# Patient Record
Sex: FEMALE | Race: WHITE | NOT HISPANIC OR LATINO | Employment: STUDENT | URBAN - METROPOLITAN AREA
[De-identification: names, ages, dates, MRNs, and addresses within clinical notes are randomized per-mention and may not be internally consistent; named-entity substitution may affect disease eponyms.]

---

## 2023-04-28 LAB
ALANINE AMINOTRANSFERASE (SGPT) (U/L) IN SER/PLAS: 11 U/L (ref 7–45)
ALBUMIN (G/DL) IN SER/PLAS: 4 G/DL (ref 3.4–5)
ALKALINE PHOSPHATASE (U/L) IN SER/PLAS: 43 U/L (ref 33–110)
ANION GAP IN SER/PLAS: 12 MMOL/L (ref 10–20)
ASPARTATE AMINOTRANSFERASE (SGOT) (U/L) IN SER/PLAS: 14 U/L (ref 9–39)
BASOPHILS (10*3/UL) IN BLOOD BY AUTOMATED COUNT: 0.11 X10E9/L (ref 0–0.1)
BASOPHILS/100 LEUKOCYTES IN BLOOD BY AUTOMATED COUNT: 1.7 % (ref 0–2)
BILIRUBIN TOTAL (MG/DL) IN SER/PLAS: 0.4 MG/DL (ref 0–1.2)
CALCIDIOL (25 OH VITAMIN D3) (NG/ML) IN SER/PLAS: 27 NG/ML
CALCIUM (MG/DL) IN SER/PLAS: 8.8 MG/DL (ref 8.6–10.6)
CARBON DIOXIDE, TOTAL (MMOL/L) IN SER/PLAS: 25 MMOL/L (ref 21–32)
CHLORIDE (MMOL/L) IN SER/PLAS: 106 MMOL/L (ref 98–107)
CHOLESTEROL (MG/DL) IN SER/PLAS: 115 MG/DL (ref 0–199)
CHOLESTEROL IN HDL (MG/DL) IN SER/PLAS: 32.5 MG/DL
CHOLESTEROL/HDL RATIO: 3.5
COBALAMIN (VITAMIN B12) (PG/ML) IN SER/PLAS: 587 PG/ML (ref 211–911)
CREATININE (MG/DL) IN SER/PLAS: 0.45 MG/DL (ref 0.5–1.05)
EOSINOPHILS (10*3/UL) IN BLOOD BY AUTOMATED COUNT: 0.25 X10E9/L (ref 0–0.7)
EOSINOPHILS/100 LEUKOCYTES IN BLOOD BY AUTOMATED COUNT: 3.9 % (ref 0–6)
ERYTHROCYTE DISTRIBUTION WIDTH (RATIO) BY AUTOMATED COUNT: 12.3 % (ref 11.5–14.5)
ERYTHROCYTE MEAN CORPUSCULAR HEMOGLOBIN CONCENTRATION (G/DL) BY AUTOMATED: 32.9 G/DL (ref 32–36)
ERYTHROCYTE MEAN CORPUSCULAR VOLUME (FL) BY AUTOMATED COUNT: 94 FL (ref 80–100)
ERYTHROCYTES (10*6/UL) IN BLOOD BY AUTOMATED COUNT: 3.72 X10E12/L (ref 4–5.2)
FERRITIN (UG/LL) IN SER/PLAS: 26 UG/L (ref 8–150)
GFR FEMALE: >90 ML/MIN/1.73M2
GLUCOSE (MG/DL) IN SER/PLAS: 76 MG/DL (ref 74–99)
HEMATOCRIT (%) IN BLOOD BY AUTOMATED COUNT: 35 % (ref 36–46)
HEMOGLOBIN (G/DL) IN BLOOD: 11.5 G/DL (ref 12–16)
IMMATURE GRANULOCYTES/100 LEUKOCYTES IN BLOOD BY AUTOMATED COUNT: 0.3 % (ref 0–0.9)
IRON (UG/DL) IN SER/PLAS: 72 UG/DL (ref 35–150)
IRON BINDING CAPACITY (UG/DL) IN SER/PLAS: 351 UG/DL (ref 240–445)
IRON SATURATION (%) IN SER/PLAS: 21 % (ref 25–45)
LDL: 70 MG/DL (ref 0–109)
LEUKOCYTES (10*3/UL) IN BLOOD BY AUTOMATED COUNT: 6.4 X10E9/L (ref 4.4–11.3)
LYMPHOCYTES (10*3/UL) IN BLOOD BY AUTOMATED COUNT: 1.92 X10E9/L (ref 1.2–4.8)
LYMPHOCYTES/100 LEUKOCYTES IN BLOOD BY AUTOMATED COUNT: 30.2 % (ref 13–44)
MONOCYTES (10*3/UL) IN BLOOD BY AUTOMATED COUNT: 0.66 X10E9/L (ref 0.1–1)
MONOCYTES/100 LEUKOCYTES IN BLOOD BY AUTOMATED COUNT: 10.4 % (ref 2–10)
NEUTROPHILS (10*3/UL) IN BLOOD BY AUTOMATED COUNT: 3.39 X10E9/L (ref 1.2–7.7)
NEUTROPHILS/100 LEUKOCYTES IN BLOOD BY AUTOMATED COUNT: 53.5 % (ref 40–80)
NON HDL CHOLESTEROL: 83 MG/DL (ref 0–119)
NRBC (PER 100 WBCS) BY AUTOMATED COUNT: 0 /100 WBC (ref 0–0)
PLATELETS (10*3/UL) IN BLOOD AUTOMATED COUNT: 215 X10E9/L (ref 150–450)
POTASSIUM (MMOL/L) IN SER/PLAS: 4.1 MMOL/L (ref 3.5–5.3)
PROTEIN TOTAL: 6.6 G/DL (ref 6.4–8.2)
SODIUM (MMOL/L) IN SER/PLAS: 139 MMOL/L (ref 136–145)
THYROTROPIN (MIU/L) IN SER/PLAS BY DETECTION LIMIT <= 0.05 MIU/L: 1.74 MIU/L (ref 0.44–3.98)
TRIGLYCERIDE (MG/DL) IN SER/PLAS: 65 MG/DL (ref 0–149)
UREA NITROGEN (MG/DL) IN SER/PLAS: 7 MG/DL (ref 6–23)
VLDL: 13 MG/DL (ref 0–40)

## 2023-07-20 LAB
BASOPHILS (10*3/UL) IN BLOOD BY AUTOMATED COUNT: 0.09 X10E9/L (ref 0–0.1)
BASOPHILS/100 LEUKOCYTES IN BLOOD BY AUTOMATED COUNT: 1.4 % (ref 0–2)
DEAMIDATED GLIADIN PEPTIDE IGA: <1 U/ML (ref 0–14)
DEAMIDATED GLIADIN PEPTIDE IGG: <1 U/ML (ref 0–14)
EOSINOPHILS (10*3/UL) IN BLOOD BY AUTOMATED COUNT: 0.19 X10E9/L (ref 0–0.7)
EOSINOPHILS/100 LEUKOCYTES IN BLOOD BY AUTOMATED COUNT: 2.9 % (ref 0–6)
ERYTHROCYTE DISTRIBUTION WIDTH (RATIO) BY AUTOMATED COUNT: 12.4 % (ref 11.5–14.5)
ERYTHROCYTE MEAN CORPUSCULAR HEMOGLOBIN CONCENTRATION (G/DL) BY AUTOMATED: 32 G/DL (ref 32–36)
ERYTHROCYTE MEAN CORPUSCULAR VOLUME (FL) BY AUTOMATED COUNT: 96 FL (ref 80–100)
ERYTHROCYTES (10*6/UL) IN BLOOD BY AUTOMATED COUNT: 3.87 X10E12/L (ref 4–5.2)
FERRITIN (UG/LL) IN SER/PLAS: 26 UG/L (ref 8–150)
HELICOBACTER PYLORI AG: NORMAL
HEMATOCRIT (%) IN BLOOD BY AUTOMATED COUNT: 37.2 % (ref 36–46)
HEMOGLOBIN (G/DL) IN BLOOD: 11.9 G/DL (ref 12–16)
IMMATURE GRANULOCYTES/100 LEUKOCYTES IN BLOOD BY AUTOMATED COUNT: 0.5 % (ref 0–0.9)
IRON (UG/DL) IN SER/PLAS: 91 UG/DL (ref 35–150)
IRON BINDING CAPACITY (UG/DL) IN SER/PLAS: 345 UG/DL (ref 240–445)
IRON SATURATION (%) IN SER/PLAS: 26 % (ref 25–45)
LEUKOCYTES (10*3/UL) IN BLOOD BY AUTOMATED COUNT: 6.5 X10E9/L (ref 4.4–11.3)
LYMPHOCYTES (10*3/UL) IN BLOOD BY AUTOMATED COUNT: 1.84 X10E9/L (ref 1.2–4.8)
LYMPHOCYTES/100 LEUKOCYTES IN BLOOD BY AUTOMATED COUNT: 28.3 % (ref 13–44)
MONOCYTES (10*3/UL) IN BLOOD BY AUTOMATED COUNT: 0.6 X10E9/L (ref 0.1–1)
MONOCYTES/100 LEUKOCYTES IN BLOOD BY AUTOMATED COUNT: 9.2 % (ref 2–10)
NEUTROPHILS (10*3/UL) IN BLOOD BY AUTOMATED COUNT: 3.75 X10E9/L (ref 1.2–7.7)
NEUTROPHILS/100 LEUKOCYTES IN BLOOD BY AUTOMATED COUNT: 57.7 % (ref 40–80)
NRBC (PER 100 WBCS) BY AUTOMATED COUNT: 0 /100 WBC (ref 0–0)
PLATELETS (10*3/UL) IN BLOOD AUTOMATED COUNT: 173 X10E9/L (ref 150–450)
TISSUE TRANSGLUTAMINASE IGG: <1 U/ML (ref 0–14)
TISSUE TRANSGLUTAMINASE, IGA: <1 U/ML (ref 0–14)

## 2023-11-22 ENCOUNTER — LAB (OUTPATIENT)
Dept: LAB | Facility: LAB | Age: 18
End: 2023-11-22
Payer: COMMERCIAL

## 2023-11-22 DIAGNOSIS — D50.9 IRON DEFICIENCY ANEMIA, UNSPECIFIED: ICD-10-CM

## 2023-11-22 DIAGNOSIS — E55.9 VITAMIN D DEFICIENCY, UNSPECIFIED: Primary | ICD-10-CM

## 2023-11-22 LAB
25(OH)D3 SERPL-MCNC: 26 NG/ML (ref 30–100)
BASOPHILS # BLD AUTO: 0.09 X10*3/UL (ref 0–0.1)
BASOPHILS NFR BLD AUTO: 1.1 %
EOSINOPHIL # BLD AUTO: 0.17 X10*3/UL (ref 0–0.7)
EOSINOPHIL NFR BLD AUTO: 2 %
ERYTHROCYTE [DISTWIDTH] IN BLOOD BY AUTOMATED COUNT: 12 % (ref 11.5–14.5)
FERRITIN SERPL-MCNC: 55 NG/ML (ref 8–150)
HCT VFR BLD AUTO: 37.4 % (ref 36–46)
HGB BLD-MCNC: 12.2 G/DL (ref 12–16)
IMM GRANULOCYTES # BLD AUTO: 0.03 X10*3/UL (ref 0–0.7)
IMM GRANULOCYTES NFR BLD AUTO: 0.4 % (ref 0–0.9)
IRON SATN MFR SERPL: 34 % (ref 25–45)
IRON SERPL-MCNC: 122 UG/DL (ref 35–150)
LYMPHOCYTES # BLD AUTO: 1.71 X10*3/UL (ref 1.2–4.8)
LYMPHOCYTES NFR BLD AUTO: 20.2 %
MCH RBC QN AUTO: 30.7 PG (ref 26–34)
MCHC RBC AUTO-ENTMCNC: 32.6 G/DL (ref 32–36)
MCV RBC AUTO: 94 FL (ref 80–100)
MONOCYTES # BLD AUTO: 0.73 X10*3/UL (ref 0.1–1)
MONOCYTES NFR BLD AUTO: 8.6 %
NEUTROPHILS # BLD AUTO: 5.73 X10*3/UL (ref 1.2–7.7)
NEUTROPHILS NFR BLD AUTO: 67.7 %
NRBC BLD-RTO: 0 /100 WBCS (ref 0–0)
PLATELET # BLD AUTO: 221 X10*3/UL (ref 150–450)
RBC # BLD AUTO: 3.97 X10*6/UL (ref 4–5.2)
TIBC SERPL-MCNC: 355 UG/DL (ref 240–445)
UIBC SERPL-MCNC: 233 UG/DL (ref 110–370)
WBC # BLD AUTO: 8.5 X10*3/UL (ref 4.4–11.3)

## 2023-11-22 PROCEDURE — 85025 COMPLETE CBC W/AUTO DIFF WBC: CPT

## 2023-11-22 PROCEDURE — 36415 COLL VENOUS BLD VENIPUNCTURE: CPT

## 2023-11-22 PROCEDURE — 82306 VITAMIN D 25 HYDROXY: CPT

## 2023-11-22 PROCEDURE — 83550 IRON BINDING TEST: CPT

## 2023-11-22 PROCEDURE — 83540 ASSAY OF IRON: CPT

## 2023-11-22 PROCEDURE — 82728 ASSAY OF FERRITIN: CPT

## 2023-11-24 NOTE — RESULT ENCOUNTER NOTE
Dameon Reyna!  Your labs look better. Your iron is where we need it to be. Please keep taking the same dose. I would like to make sure it stays there by planning to check it again in six months. Your vitamin D is still too low. I would add 2000 International Units to whatever you are taking each day to improve the level. This is important for preventing colds and flu this winter. Let me know if you have any questions.   Uma Shepard MD PhD

## 2024-02-06 ENCOUNTER — ALLIED HEALTH (OUTPATIENT)
Dept: INTEGRATIVE MEDICINE | Facility: CLINIC | Age: 19
End: 2024-02-06
Payer: COMMERCIAL

## 2024-02-06 VITALS
OXYGEN SATURATION: 99 % | DIASTOLIC BLOOD PRESSURE: 65 MMHG | BODY MASS INDEX: 23.1 KG/M2 | HEART RATE: 84 BPM | SYSTOLIC BLOOD PRESSURE: 105 MMHG | WEIGHT: 133.5 LBS

## 2024-02-06 DIAGNOSIS — K21.9 GASTROESOPHAGEAL REFLUX DISEASE WITHOUT ESOPHAGITIS: ICD-10-CM

## 2024-02-06 DIAGNOSIS — M25.571 ACUTE RIGHT ANKLE PAIN: ICD-10-CM

## 2024-02-06 DIAGNOSIS — E53.8 VITAMIN B12 DEFICIENCY: ICD-10-CM

## 2024-02-06 DIAGNOSIS — E55.9 VITAMIN D DEFICIENCY: ICD-10-CM

## 2024-02-06 DIAGNOSIS — D50.9 IRON DEFICIENCY ANEMIA, UNSPECIFIED IRON DEFICIENCY ANEMIA TYPE: ICD-10-CM

## 2024-02-06 DIAGNOSIS — F41.9 ANXIETY: Primary | ICD-10-CM

## 2024-02-06 PROCEDURE — 99215 OFFICE O/P EST HI 40 MIN: CPT | Performed by: INTERNAL MEDICINE

## 2024-02-06 RX ORDER — ERGOCALCIFEROL 1.25 MG/1
5000 CAPSULE ORAL
COMMUNITY

## 2024-02-06 RX ORDER — PNV 119/IRON FUM/FOLIC ACID 29 MG-1 MG
TABLET ORAL
COMMUNITY

## 2024-02-06 RX ORDER — MULTIVITAMIN/IRON/FOLIC ACID 18MG-0.4MG
1 TABLET ORAL DAILY
COMMUNITY

## 2024-02-06 RX ORDER — ESCITALOPRAM OXALATE 20 MG/1
25 TABLET ORAL DAILY
COMMUNITY

## 2024-02-06 RX ORDER — ACETAMINOPHEN, DIPHENHYDRAMINE HCL, PHENYLEPHRINE HCL 325; 25; 5 MG/1; MG/1; MG/1
1 TABLET ORAL DAILY
COMMUNITY

## 2024-02-06 RX ORDER — OMEGA-3-ACID ETHYL ESTERS 1 G/1
1 CAPSULE, LIQUID FILLED ORAL 2 TIMES DAILY
COMMUNITY

## 2024-02-06 RX ORDER — FERROUS SULFATE 325(65) MG
65 TABLET, DELAYED RELEASE (ENTERIC COATED) ORAL
COMMUNITY

## 2024-02-06 RX ORDER — ACETAMINOPHEN 500 MG
5000 TABLET ORAL DAILY
COMMUNITY

## 2024-02-06 ASSESSMENT — ENCOUNTER SYMPTOMS
SHORTNESS OF BREATH: 0
DYSURIA: 0
COUGH: 0
WEAKNESS: 0
FEVER: 0
ARTHRALGIAS: 0
APPETITE CHANGE: 0
BACK PAIN: 0
SLEEP DISTURBANCE: 0
ABDOMINAL PAIN: 1
FATIGUE: 0
NERVOUS/ANXIOUS: 1
HEADACHES: 0
DIFFICULTY URINATING: 0
MYALGIAS: 0

## 2024-02-06 NOTE — ASSESSMENT & PLAN NOTE
Well controlled.  I wonder if she could decrease dose of medication if her vitmain d were finally above normal. Recheck vitamin d level.

## 2024-02-06 NOTE — PATIENT INSTRUCTIONS
Try the ten minute - 20 minute full body stretches on peloton Steven Magiacomo and Oliver Aldis   Try to get at least 1 cup of leafy greens per day.   Check out the [employer] to see if there are things that interest you. The meeting is in approximately October of every year.   Get your labs but stay off the prenatal and the vitamin b12 for three days before.    Follow up in six months or before you leave for grad school.    Uma Shepard MD PhD

## 2024-02-06 NOTE — ASSESSMENT & PLAN NOTE
Resolved on plant based diet.  Continue to avoid foods that provoke her nausea and reflux. Monitor weight (which is fine) and b12 levels annually because at risk for b12 deficiency without supplementation.

## 2024-02-06 NOTE — PROGRESS NOTES
Integrative Medicine Follow-up Visit :     Subjective   Patient ID: Maribell Rosales is a 18 y.o. female who presents for fuv       HPI  Hurt her ankle three weeks ago. Right ankle. Twisted it. Hurts to weight bear. Can do weight lifting of her arms but cannot do hit exercise due to pain. Hurts to walk now. Originally after the twisting it her pain was 8/10. Now pain is 4/10.      Mood really good.  Thinks combination of exericse and diet really helping. Take meds as well. Exercising 30-60 minutes hit workouts.  2 days per week or barre. Does peloton for strength x 3 days per week 30 minutes.   Stomach is much better.  Does not bother her at all. Totally plant based. No issues with stomach pain or constipation. Eats a lot of fruit. Eating less vegetables. Eats an avocado.     Still consuming gluten and ok. Does not seem to bother her stomach except when she eats a lot of pasta which is rare    Wants to know if she weighs an appropriate amount.     Pain:right ankle. New. See above.     Review of Systems   Constitutional:  Negative for appetite change, fatigue and fever.   HENT:  Negative for congestion and hearing loss.    Eyes:  Negative for visual disturbance.   Respiratory:  Negative for cough and shortness of breath.    Cardiovascular:  Negative for chest pain and leg swelling.   Gastrointestinal:  Positive for abdominal pain (hx of severe painful reflux but none now).   Genitourinary:  Negative for difficulty urinating, dysuria and urgency.   Musculoskeletal:  Negative for arthralgias, back pain and myalgias.   Skin:  Negative for rash.   Neurological:  Negative for weakness and headaches.   Psychiatric/Behavioral:  Negative for behavioral problems and sleep disturbance. The patient is nervous/anxious (well controlled but hx of anxiety).                   Objective   /65 (BP Location: Left arm, Patient Position: Sitting, BP Cuff Size: Adult)   Pulse 84   Wt 60.6 kg (133 lb 8 oz)   SpO2 99%   BMI 23.10  kg/m²     Physical Exam  HENT:      Head: Normocephalic and atraumatic.      Mouth/Throat:      Mouth: Mucous membranes are moist.   Cardiovascular:      Rate and Rhythm: Normal rate.   Pulmonary:      Effort: Pulmonary effort is normal. No respiratory distress.   Musculoskeletal:         General: No swelling or deformity.      Cervical back: Normal range of motion.   Skin:     General: Skin is dry.      Findings: No rash.   Neurological:      General: No focal deficit present.      Mental Status: She is alert and oriented to person, place, and time.   Psychiatric:         Mood and Affect: Mood normal.         Thought Content: Thought content normal.      Comments: Normal affect                      Assessment/Plan     Problem List Items Addressed This Visit             ICD-10-CM    Acute right ankle pain M25.571     Obtain xray. Refer to ortho if fractured.          Relevant Orders    XR ankle right 3+ views    Referral to Orthopaedic Surgery    Anxiety - Primary F41.9     Well controlled.  I wonder if she could decrease dose of medication if her vitmain d were finally above normal. Recheck vitamin d level.          GE reflux K21.9     Resolved on plant based diet.  Continue to avoid foods that provoke her nausea and reflux. Monitor weight (which is fine) and b12 levels annually because at risk for b12 deficiency without supplementation.          Iron deficiency anemia D50.9     Iron studies improved most recently. Recommend test yearly. For now, stay on same dose.          Vitamin B12 deficiency E53.8    Relevant Orders    Vitamin B12    Vitamin D deficiency E55.9    Relevant Orders    Vitamin D 25-Hydroxy,Total (for eval of Vitamin D levels)         Recommend Follow up in : 6 months.    Uma Shepard MD PhD    Time Spent  Prep time on day of patient encounter: 5 minutes  Time spent directly with patient, family or caregiver: 30 minutes  Additional Time Spent on Patient Care Activities: 0  minutes  Documentation Time: 8 minutes  Other Time Spent: 0 minutes  Total: 43 minutes

## 2024-02-20 ENCOUNTER — HOSPITAL ENCOUNTER (OUTPATIENT)
Dept: RADIOLOGY | Facility: HOSPITAL | Age: 19
Discharge: HOME | End: 2024-02-20
Payer: COMMERCIAL

## 2024-02-20 DIAGNOSIS — M25.571 ACUTE RIGHT ANKLE PAIN: ICD-10-CM

## 2024-02-20 PROCEDURE — 73610 X-RAY EXAM OF ANKLE: CPT | Mod: RT

## 2024-02-20 PROCEDURE — 73610 X-RAY EXAM OF ANKLE: CPT | Mod: RIGHT SIDE | Performed by: RADIOLOGY

## 2024-02-27 DIAGNOSIS — M25.571 ACUTE RIGHT ANKLE PAIN: Primary | ICD-10-CM

## 2024-02-27 NOTE — PROGRESS NOTES
Subjective   Patient ID: Maribell Rosales is a 18 y.o. female who presents today, February 29, 2024 for a new patient evaluation and treatment of mid back pain and low back pain.    (1/20) BREANNA CHAMORRO'CLARISSA    Chiropractic Medicine HPI:  Provacative factors include : standing lateral raises  Palliative factors incude : ice medications stretching  Pain is described as : ache tingling knotty heavy  Previous treatment for complaint has included : ice NSAIDS stretching  Patient denies : bladder weakness bowel weakness catching clicking grinding instability numbness popping radiating pain into the distal extremity trauma  Intensity of the pain: Patient rates least severe pain at 3/10 on a numerical pain scale; Patient rates most severe pain at 10/10 on a numerical pain scale  Oswestry Disability Index has been completed: yes     Initial exam:   Maribell Rosales presents for evaluation and treatment of left sided scapular pain and low back pain. She states that her left sided scapular pain started in mid-January. She denies any traumas, accidents, incidents, or injuries. She states that she has noticed a significant loss of strength in the left arm when compared to the right. She states that she predominately notices this decreased strength and some pain when performing lateral raises with weight. She reports that her secondary complaint of low back pain. She states that these symptoms have been present for years and occur intermittently. She states that standing for extended periods of time will aggravate her symptoms, causing a diffuse bilateral discomfort parallel to her belt line. She denies any radiating symptoms.     Maribell also reports a recent ankle injury. She states that that she was walking and suddenly felt pain along the the medial plantar surface of the foot, along the medial aspect of the achilles tendon and the lateral aspect of the achilles tendon. She states that her range of motion was decreased, she  notes pain with palpation and difficulty with weight bearing movements. She denies any swelling or bruising.      Objective   Review of Systems   Constitutional: Negative.    Eyes: Negative.    Respiratory: Negative.     Cardiovascular: Negative.    Gastrointestinal: Negative.    Genitourinary: Negative.    Musculoskeletal:  Positive for back pain, gait problem and myalgias.   Hematological: Negative.    Psychiatric/Behavioral: Negative.     All other systems reviewed and are negative.    Physical Exam  Neurological:      General: No focal deficit present.      Mental Status: She is alert and oriented to person, place, and time.      Cranial Nerves: No dysarthria or facial asymmetry.      Sensory: Sensation is intact.      Motor: Weakness present.      Coordination: Coordination is intact.      Gait: Gait abnormal.      Spine Musculoskeletal Exam    Inspection    Leg length disparity: left greater than right    Shoulder elevation: right    Sagittal balance: anterior imbalance    Palpation    Cervical Spine    Tenderness: present      Periscapular: right    Right      Masses: none      Spasms: none      Crepitus: none      Muscle tone: increased    Left      Masses: none      Spasms: none      Crepitus: none      Muscle tone: normal    Thoracolumbar    Tenderness: present      Paraspinous: right      Iliac crest: right      Spinous process: upper lumbar and mid lumbar    Right      Masses: none      Spasms: none      Crepitus: none      Muscle tone: normal    Left      Masses: none      Spasms: none      Crepitus: none      Muscle tone: normal    Range of Motion    Cervical Spine       Cervical flexion: normal. Cervical flexion detail: no pain.     Cervical extension: normal. Cervical extension detail: no pain.       Right      Lateral bending: normal. Lateral bending detail: no pain.       Lateral rotation: normal. Lateral rotation detail: no pain.       Left      Lateral bending: normal. Lateral bending detail: no  pain.       Lateral rotation: normal. Lateral rotation detail: no pain.      Thoracolumbar       Flexion: normal. Flexion detail: no pain.     Extension: 75%. Extension detail: pain.       Right      Lateral bending: normal. Lateral bending detail: no pain.       Lateral rotation: normal. Lateral rotation detail: no pain.       Left      Lateral bending: normal. Lateral bending detail: no pain.       Lateral rotation: 75%. Lateral rotation detail: no pain.      Strength    Cervical Spine      Right      Deltoid: 5/5.       Shoulder external rotation: 5/5.       Biceps: 4/5.       Triceps: 5/5.       Wrist extension: 5/5.       Wrist flexion: 5/5.       FDP: 5/5.       Interossei: 5/5.       Left      Deltoid: 5/5.       Shoulder external rotation: 5/5.       Biceps: 5/5.       Triceps: 5/5.       Wrist extension: 5/5.       Wrist flexion: 5/5.       FDP: 5/5.       Interossei: 5/5.     Thoracolumbar       Right      Extensor hallucis longus: 5/5.       Tibialis anterior: 4/5. Tibialis anterior is affected by pain.       Plantar flexion: 4/5. Plantar flexion is affected by pain.       Peroneals: 4/5. Peroneals are affected by pain.       Quadriceps: 5/5.       Hamstrin/5.       Hip abductors: 5/5.       Hip flexion: 4/5.       Hip adduction: 5/5.        Left      Extensor hallucis longus: 5/5.       Tibialis anterior: 5/5.       Tibialis posterior: 5/5.       Plantar flexion: 5/5.       Peroneals: 5/5.       Quadriceps: 5/5.       Hamstrin/5.       Hip abductors: 5/5.       Hip flexion: 5/5.       Hip adduction: 5/5.      General    Neurological: alert     Orthopedic Tests:  Cervical:   Neutral compression negative.  Right Maximum compression negative.  Left Maximum compression negative.  Right Shoulder depression negative.  Left Shoulder depression negative.  Cervical distraction negative.  VBI Test negative.    Lumbar/Sacrum:   Right Bob's Lumbar negative.  Left Bob's Lumbar negative.  Right Yeoman's  negative.  Left Yeoman's negative.  Right Nachlas' negative.  Left Nachlas' negative.    Segmental Joint(s): Segmental joint dysfunction was assessed with motion palpation and is identified in the following areas:  Cervical : C2 C4  Thoracic : T2., T4, T5, T6, and T8  Lumbopelvic / Sacral SIJ : L2, L3, and L5/S1    Assessment/Plan   Today's Treatment Included: Chiropractic manipulation to the Segmental Joint(s) Cervical : C2 C4 Segmental Joint(s) Lumbopelvic/Sacral SIJ : L2, L3, and L5/S1 Segmental Joint(s) Thoracic : T2., T4, T5, T6, and T8   Treatment Techniques Used : Diversified CMT and Manual traction  Neuromuscular Re-Education (52430): Start time: 3:30 pm End time: 3:45 pm  1 Units  Pin and stretch  Integrative Dry Needling (IDN) - Needles in / out:  16.    Soft-tissue mobilization was performed in the following areas:           Peroneal mm. right, Calf mm. right, Flexor mm. right, and Achilles right  Trapezius mm. Upper , Middle , and Lower  left, Levator Scap. left, Rhomboids left, Supraspinatus left, and Infraspinatus left      Treatment Plan:   The patient and I discussed the risks and benefits of Chiropractic Care. Consent for care was given both written and orally by the patient.    Based on the patient's subjective complaints along with the examination findings, it is advised that a course of Chiropractic Treatment by initiated in the form of:   Chiropractic Manipulation (CMT)  Neuro-Muscular Re-education  Integrative Dry Needing (IDN)    Chiropractic treatment recommended at a frequency of 2 times per month for 1 month, then adjust care plan until symptoms are mild or manageable.    The goals of the treatment will be to: decrease pain, increase activity, increase range of motion, reduce lower back pain, improve quality of life, reduce leg pain, improve the ability to stand, return to athletic performance, decrease muscular hypertonicity, increase functional capacity, and improve postural  strength    The patient tolerated today's treatment with little or no additional discomfort and was instructed to contact the office for questions or concerns.     Follow up as scheduled.

## 2024-02-27 NOTE — PROGRESS NOTES
Ankle pain still moderately severe and not getting better. Refer to ortho. Patient aware.   Uma Shepard MD PhD

## 2024-02-29 ENCOUNTER — ALLIED HEALTH (OUTPATIENT)
Dept: INTEGRATIVE MEDICINE | Facility: CLINIC | Age: 19
End: 2024-02-29
Payer: COMMERCIAL

## 2024-02-29 DIAGNOSIS — M99.03 SEGMENTAL AND SOMATIC DYSFUNCTION OF LUMBAR REGION: ICD-10-CM

## 2024-02-29 DIAGNOSIS — M79.9 SOFT TISSUE DISORDER: ICD-10-CM

## 2024-02-29 DIAGNOSIS — M89.8X1 PAIN IN SCAPULA: ICD-10-CM

## 2024-02-29 DIAGNOSIS — M99.01 SEGMENTAL AND SOMATIC DYSFUNCTION OF CERVICAL REGION: ICD-10-CM

## 2024-02-29 DIAGNOSIS — M54.59 MECHANICAL LOW BACK PAIN: ICD-10-CM

## 2024-02-29 DIAGNOSIS — M99.04 SEGMENTAL AND SOMATIC DYSFUNCTION OF SACRAL REGION: ICD-10-CM

## 2024-02-29 DIAGNOSIS — M89.8X1 PERISCAPULAR PAIN: ICD-10-CM

## 2024-02-29 DIAGNOSIS — M54.59 POSTURAL LOW BACK PAIN: ICD-10-CM

## 2024-02-29 DIAGNOSIS — M99.02 SEGMENTAL AND SOMATIC DYSFUNCTION OF THORACIC REGION: Primary | ICD-10-CM

## 2024-02-29 DIAGNOSIS — M79.9 POSTURAL STRAIN: ICD-10-CM

## 2024-02-29 PROCEDURE — 98941 CHIROPRACT MANJ 3-4 REGIONS: CPT | Performed by: CHIROPRACTOR

## 2024-02-29 PROCEDURE — 97112 NEUROMUSCULAR REEDUCATION: CPT | Performed by: CHIROPRACTOR

## 2024-02-29 PROCEDURE — 99203 OFFICE O/P NEW LOW 30 MIN: CPT | Performed by: CHIROPRACTOR

## 2024-02-29 ASSESSMENT — ENCOUNTER SYMPTOMS
RESPIRATORY NEGATIVE: 1
BACK PAIN: 1
MYALGIAS: 1
CARDIOVASCULAR NEGATIVE: 1
PSYCHIATRIC NEGATIVE: 1
HEMATOLOGIC/LYMPHATIC NEGATIVE: 1
CONSTITUTIONAL NEGATIVE: 1
GASTROINTESTINAL NEGATIVE: 1
EYES NEGATIVE: 1

## 2024-03-06 DIAGNOSIS — M54.59 POSTURAL LOW BACK PAIN: ICD-10-CM

## 2024-03-06 DIAGNOSIS — M89.8X1 PAIN IN SCAPULA: ICD-10-CM

## 2024-03-06 DIAGNOSIS — M99.02 SEGMENTAL AND SOMATIC DYSFUNCTION OF THORACIC REGION: Primary | ICD-10-CM

## 2024-03-06 DIAGNOSIS — M79.9 SOFT TISSUE DISORDER: ICD-10-CM

## 2024-03-08 ENCOUNTER — APPOINTMENT (OUTPATIENT)
Dept: ORTHOPEDIC SURGERY | Facility: HOSPITAL | Age: 19
End: 2024-03-08
Payer: COMMERCIAL

## 2024-03-12 ENCOUNTER — OFFICE VISIT (OUTPATIENT)
Dept: ORTHOPEDIC SURGERY | Facility: CLINIC | Age: 19
End: 2024-03-12
Payer: COMMERCIAL

## 2024-03-12 ENCOUNTER — HOSPITAL ENCOUNTER (OUTPATIENT)
Dept: RADIOLOGY | Facility: CLINIC | Age: 19
Discharge: HOME | End: 2024-03-12
Payer: COMMERCIAL

## 2024-03-12 DIAGNOSIS — M25.571 ACUTE RIGHT ANKLE PAIN: ICD-10-CM

## 2024-03-12 DIAGNOSIS — S93.431A ANKLE SYNDESMOSIS DISRUPTION, RIGHT, INITIAL ENCOUNTER: Primary | ICD-10-CM

## 2024-03-12 PROCEDURE — 99213 OFFICE O/P EST LOW 20 MIN: CPT | Performed by: STUDENT IN AN ORGANIZED HEALTH CARE EDUCATION/TRAINING PROGRAM

## 2024-03-12 PROCEDURE — 73610 X-RAY EXAM OF ANKLE: CPT | Mod: RIGHT SIDE | Performed by: RADIOLOGY

## 2024-03-12 PROCEDURE — L1902 AFO ANKLE GAUNTLET PRE OTS: HCPCS | Performed by: STUDENT IN AN ORGANIZED HEALTH CARE EDUCATION/TRAINING PROGRAM

## 2024-03-12 PROCEDURE — 73610 X-RAY EXAM OF ANKLE: CPT | Mod: RT

## 2024-03-12 PROCEDURE — 1036F TOBACCO NON-USER: CPT | Performed by: STUDENT IN AN ORGANIZED HEALTH CARE EDUCATION/TRAINING PROGRAM

## 2024-03-12 ASSESSMENT — PAIN DESCRIPTION - DESCRIPTORS: DESCRIPTORS: SHARP

## 2024-03-12 ASSESSMENT — PAIN - FUNCTIONAL ASSESSMENT: PAIN_FUNCTIONAL_ASSESSMENT: 0-10

## 2024-03-12 ASSESSMENT — PAIN SCALES - GENERAL: PAINLEVEL_OUTOF10: 8

## 2024-03-12 NOTE — PROGRESS NOTES
ORTHOPAEDIC SURGERY HISTORY & PHYSICAL     Chief Complaint:  Right ankle pain    History Of Present Illness  Maribell Rosales is a 18 y.o. female who presents for evaluation of right ankle pain.  Patient reports the atraumatic onset of right ankle pain while walking approximately 6 weeks ago.  Patient denies specific injury or change in her activity or shoewear.  Pain is made worse with walking and standing.  She has never had a pain like this in the past.  She has tried dry needling as well as over-the-counter bracing and limiting her activity with persistent pain.  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  This has been difficult for her as well is working out.  She denies any associated numbness, tingling or weakness.     Past Medical History  Past Medical History:   Diagnosis Date    Allergy to milk products 01/30/2019    History of allergy to milk products    Unspecified asthma, uncomplicated 01/15/2018    Childhood asthma       Surgical History  Recent Surgeries in Orthopaedic Surgery            No cases to display             Social History  Social History     Socioeconomic History    Marital status: Single     Spouse name: None    Number of children: None    Years of education: None    Highest education level: None   Occupational History    None   Tobacco Use    Smoking status: Never     Passive exposure: Never    Smokeless tobacco: Never   Substance and Sexual Activity    Alcohol use: Never    Drug use: Never    Sexual activity: None   Other Topics Concern    None   Social History Narrative    None     Social Determinants of Health     Financial Resource Strain: Not on file   Food Insecurity: Not on file   Transportation Needs: Not on file   Physical Activity: Not on file   Stress: Not on file   Social Connections: Not on file   Intimate Partner Violence: Not on file   Housing Stability: Not on file       Family History  No family history on file.     Allergies  No Known  Allergies    Review of Systems  REVIEW OF SYSTEMS  Constitutional: no unplanned weight loss  Psychiatric: no suicidal ideation  ENT: no vision changes, no sinus problems  Pulmonary: no shortness of breath  Lymphatic: no enlarged lymph nodes  Cardiovascular: no chest pain or shortness of breath  Gastrointestinal: no stomach problems  Genitourinary: no dysuria   Skin: no rashes  Endocrine: no thyroid problems  Neurological: no headache, no numbness  Hematological: no easy bruising  Musculoskeletal: Right ankle pain    Physical Exam  PHYSICAL EXAMINATION  Constitutional Exam: well developed and well nourished  Psychiatric Exam: alert and oriented, appropriate mood and behavior  Eye Exam: EOMI  Pulmonary Exam: breathing non-labored, no apparent distress  Lymphatic exam: no appreciable lymphadenopathy in the lower extremities  Cardiovascular exam: RRR to peripheral palpation, DP pulses 2+, PT 2+, toes are pink with good capillary refill, no pitting edema  Skin exam: no open lesions, rashes, abrasions or ulcerations  Neurological exam: sensation to light touch intact in both lower extremities in peripheral and dermatomal distributions (except for any abnormalities noted in musculoskeletal exam)    Musculoskeletal exam: Right lower extremity examination.  Patient pain localized to the anterolateral ankle as well as the posterior medial ankle.  She is focally tender to palpation overlying the ATFL/AITFL and PTT.  She is nontender to palpation at the CFL and peroneal tendons.  No obvious medial gutter tenderness or superficial/deep deltoid.  Patient has physiologic hindfoot valgus with neutral arch posture and no significant forefoot deformity noted.  Patient has pain-free and supple ankle, subtalar midtarsal joint range of motion.  Patient has sensation intact light touch grossly to saphenous, sural, superficial peroneal, deep peroneal and tibial nerve distribution.  She has 5 out of 5 strength in plantarflexion,  dorsiflexion and EHL.  She has 2+ DP/PT pulse palpated.  She has a mildly positive anterior drawer, negative talar tilt.  Negative syndesmotic squeeze test and negative dorsiflexion external rotation stress test.  Patient is able to both heel and toe walk but with pain.  She reports significant pain with an attempted single-leg hop on the right.    Last Recorded Vitals  There were no vitals taken for this visit.    Laboratory Results    No results found for this or any previous visit (from the past 24 hour(s)).    Radiology Results   X-ray imaging 3 view weightbearing right ankle reviewed from 03/12/2024 and independently evaluated by me demonstrates no acute fracture or dislocation.  Ankle mortise remains well aligned.  There is no obvious increased distal tib-fib clear space.    Assessment/Plan:  18-year-old female who my impression has right ankle pain at least concerning for right syndesmotic injury.  I have reviewed the diagnosis and treatment options extensively with the patient.  In my impression the patient may continue weightbearing as tolerated in her right lower extremity.  I will provide her with a lace up style ankle brace for increased support and refer formally to physical therapy to work on ankle range of motion, strengthening and proprioceptive training.  I have encouraged the patient to trial a topical anti-inflammatory, in particular for use directly over the painful region.  I will tentatively plan to see the patient back in approximate 6 weeks for repeat clinical evaluation.  I discussed with the patient that if she is not clinically improving, may consider MRI right ankle to evaluate for syndesmotic injury.  I have encouraged the patient to contact the office she develops any new pain, worsening symptoms if she has any further questions.  Upon return, patient would not require further imaging.    Patient was prescribed a mobility device, lace up ankle brace,  for right syndesmotic injury  problem.  This mobility device is required for the following reasons:    1. The patient has a mobility limitation that significantly impairs their ability to participate in one or more mobility-related activities of daily living (MRADL) in the home; and  2. The patient is able to safely use the mobility device; and  3. The functional mobility deficit can be sufficiently resolved with use of the mobility device    Verbal and written instructions for the use, wear schedule, cleaning and application of this item were given.  Education provided also included gait training and safety precautions when using this device. Patient was instructed that should the item result in increased pain, decreased sensation, increased swelling, or an overall worsening of their medical condition, to please contact our office immediately.     Antwon Washington MD, BINTA  Department of Orthopaedic Surgery  [facility]    The diagnosis and treatment plan were reviewed with the patient. All questions were answered. The patient verbalized understanding of the treatment plan. There were no barriers to understanding identified.    Note dictated with [employer] software.  Completed without full type editing and sent to avoid delay.

## 2024-03-14 ENCOUNTER — ALLIED HEALTH (OUTPATIENT)
Dept: INTEGRATIVE MEDICINE | Facility: CLINIC | Age: 19
End: 2024-03-14
Payer: COMMERCIAL

## 2024-03-14 DIAGNOSIS — M89.8X1 PAIN IN SCAPULA: ICD-10-CM

## 2024-03-14 DIAGNOSIS — M54.59 POSTURAL LOW BACK PAIN: ICD-10-CM

## 2024-03-14 DIAGNOSIS — M99.02 SEGMENTAL AND SOMATIC DYSFUNCTION OF THORACIC REGION: ICD-10-CM

## 2024-03-14 DIAGNOSIS — M79.9 SOFT TISSUE DISORDER: ICD-10-CM

## 2024-03-14 DIAGNOSIS — M99.04 SEGMENTAL AND SOMATIC DYSFUNCTION OF SACRAL REGION: ICD-10-CM

## 2024-03-14 DIAGNOSIS — M99.01 SEGMENTAL AND SOMATIC DYSFUNCTION OF CERVICAL REGION: Primary | ICD-10-CM

## 2024-03-14 DIAGNOSIS — M99.02 SEGMENTAL AND SOMATIC DYSFUNCTION OF THORACIC REGION: Primary | ICD-10-CM

## 2024-03-14 DIAGNOSIS — M79.9 POSTURAL STRAIN: ICD-10-CM

## 2024-03-14 DIAGNOSIS — M99.03 SEGMENTAL AND SOMATIC DYSFUNCTION OF LUMBAR REGION: ICD-10-CM

## 2024-03-14 PROCEDURE — 97112 NEUROMUSCULAR REEDUCATION: CPT | Performed by: CHIROPRACTOR

## 2024-03-14 PROCEDURE — 98941 CHIROPRACT MANJ 3-4 REGIONS: CPT | Performed by: CHIROPRACTOR

## 2024-03-14 NOTE — PROGRESS NOTES
Subjective   Patient ID: Maribell Rosales is a 18 y.o. female who presents March 14, 2024 for chiropractic care.    [facility] - ED: 5/29/2024  Today, the patient rates their degree of pain as a 7 out of 10 on the numeric pain rating scale.     Maribell returns for continued chiropractic care. She states that she noticed significant relief with her scapular symptoms for a couple days following her initial visit. She states that she had her ankle formally evaluated and it was determined that she had a high ankle sprain and was referred to physical therapy. She was also given an ankle brace to wear. Today, she presents with continued shoulder pain and low back discomfort. The patient denies any changes in health since her last encounter and will follow up as scheduled.   ________________________________________  Initial exam:   Maribell Rosales presents for evaluation and treatment of left sided scapular pain and low back pain. She states that her left sided scapular pain started in mid-January. She denies any traumas, accidents, incidents, or injuries. She states that she has noticed a significant loss of strength in the left arm when compared to the right. She states that she predominately notices this decreased strength and some pain when performing lateral raises with weight. She reports that her secondary complaint of low back pain. She states that these symptoms have been present for years and occur intermittently. She states that standing for extended periods of time will aggravate her symptoms, causing a diffuse bilateral discomfort parallel to her belt line. She denies any radiating symptoms.     Maribell also reports a recent ankle injury. She states that that she was walking and suddenly felt pain along the the medial plantar surface of the foot, along the medial aspect of the achilles tendon and the lateral aspect of the achilles tendon. She states that her range of motion was decreased, she notes pain with palpation  and difficulty with weight bearing movements. She denies any swelling or bruising.      Objective   Physical Exam  Neurological:      General: No focal deficit present.      Mental Status: She is alert and oriented to person, place, and time.      Cranial Nerves: No dysarthria or facial asymmetry.      Sensory: Sensation is intact.      Motor: Motor function is intact.      Coordination: Coordination is intact.      Gait: Gait is intact.       Palpation of the following region(s) revealed:  Cervical: Upper trapezius left, hypertonicity and tenderness.  Levator scapulae left, hypertonicity and tenderness.  Thoracic: Thoracic paraspinals left, hypertonicity and tenderness.  Middle trapezius left, hypertonicity and tenderness.  Rhomboids left, hypertonicity and tenderness.  Latissimus dorsi left, hypertonicity and tenderness.  Lumbar: Lumbar paraspinals bilateral, muscular hypertonicity.        Segmental Joint(s): Segmental joint dysfunction was assessed with motion palpation and is identified in the following areas:  Cervical : C2 C5  Thoracic : T4 and T5  Lumbopelvic / Sacral SIJ : L5/S1 and R SIJ    Assessment/Plan   Today's Treatment Included: Chiropractic manipulation to the Segmental Joint(s) Cervical : C2 C5 C6 Segmental Joint(s) Lumbopelvic/Sacral SIJ : L2, L5/S1, and L SIJ Segmental Joint(s) Thoracic : T4, T5, and T8   Treatment Techniques Used : Diversified CMT, Pelvic drop table technique, and Manual traction  Neuromuscular Re-Education (14766): Start time: 9:00 am End time: 9:30 am  2 Units  Pin and stretch  Soft-Tissue manipulation  Integrative Dry Needling (IDN) - Needles in / out:  11.    Soft-tissue mobilization was performed in the following areas:     Thoracic Paraspinal mm. bilateral  Lumbar Paraspinal mm. bilateral and Quadratus Lumborum bilateral      Trapezius mm. Upper  and Middle  left, Levator Scap. left, Rhomboids left, Supraspinatus left, Infraspinatus left, and Deltoid mm. left      The  patient tolerated today's treatment with little or no additional discomfort and was instructed to contact the office for questions or concerns.

## 2024-03-20 ENCOUNTER — APPOINTMENT (OUTPATIENT)
Dept: INTEGRATIVE MEDICINE | Facility: CLINIC | Age: 19
End: 2024-03-20
Payer: COMMERCIAL

## 2024-03-26 ENCOUNTER — ALLIED HEALTH (OUTPATIENT)
Dept: INTEGRATIVE MEDICINE | Facility: CLINIC | Age: 19
End: 2024-03-26
Payer: COMMERCIAL

## 2024-03-26 DIAGNOSIS — M99.04 SEGMENTAL AND SOMATIC DYSFUNCTION OF SACRAL REGION: ICD-10-CM

## 2024-03-26 DIAGNOSIS — M99.02 SEGMENTAL AND SOMATIC DYSFUNCTION OF THORACIC REGION: ICD-10-CM

## 2024-03-26 DIAGNOSIS — M54.59 POSTURAL LOW BACK PAIN: ICD-10-CM

## 2024-03-26 DIAGNOSIS — M89.8X1 PAIN IN SCAPULA: ICD-10-CM

## 2024-03-26 DIAGNOSIS — M79.9 POSTURAL STRAIN: ICD-10-CM

## 2024-03-26 DIAGNOSIS — M99.03 SEGMENTAL AND SOMATIC DYSFUNCTION OF LUMBAR REGION: ICD-10-CM

## 2024-03-26 DIAGNOSIS — M99.01 SEGMENTAL AND SOMATIC DYSFUNCTION OF CERVICAL REGION: Primary | ICD-10-CM

## 2024-03-26 PROCEDURE — 98941 CHIROPRACT MANJ 3-4 REGIONS: CPT | Performed by: CHIROPRACTOR

## 2024-03-26 PROCEDURE — 97112 NEUROMUSCULAR REEDUCATION: CPT | Performed by: CHIROPRACTOR

## 2024-03-26 NOTE — PROGRESS NOTES
Subjective   Patient ID: Maribell Rosales is a 19 y.o. female who presents March 26, 2024 for chiropractic care.    [facility] - ED: 5/29/2024    Today, the patient rates their degree of pain as a 7 out of 10 on the numeric pain rating scale.     Maribell returns for continued chiropractic care. She she reports that her ankle swelling and pain increased this week following a workout. She states that her shoulder is feeling better, but that her pain continues to decrease. The patient denies any changes in health since her last encounter and will follow up as scheduled.   ________________________________________  Initial exam:   Maribell Rosales presents for evaluation and treatment of left sided scapular pain and low back pain. She states that her left sided scapular pain started in mid-January. She denies any traumas, accidents, incidents, or injuries. She states that she has noticed a significant loss of strength in the left arm when compared to the right. She states that she predominately notices this decreased strength and some pain when performing lateral raises with weight. She reports that her secondary complaint of low back pain. She states that these symptoms have been present for years and occur intermittently. She states that standing for extended periods of time will aggravate her symptoms, causing a diffuse bilateral discomfort parallel to her belt line. She denies any radiating symptoms.     Maribell also reports a recent ankle injury. She states that that she was walking and suddenly felt pain along the the medial plantar surface of the foot, along the medial aspect of the achilles tendon and the lateral aspect of the achilles tendon. She states that her range of motion was decreased, she notes pain with palpation and difficulty with weight bearing movements. She denies any swelling or bruising.      Objective   Physical Exam  Neurological:      General: No focal deficit present.      Mental Status: She is alert  and oriented to person, place, and time.      Cranial Nerves: No dysarthria or facial asymmetry.      Sensory: Sensation is intact.      Motor: Motor function is intact.      Coordination: Coordination is intact.      Gait: Gait is intact.       Palpation of the following region(s) revealed:  Cervical: Upper trapezius left, hypertonicity and tenderness.  Levator scapulae left, hypertonicity and tenderness.  Thoracic: Thoracic paraspinals left, hypertonicity and tenderness.  Middle trapezius left, hypertonicity and tenderness.  Rhomboids left, hypertonicity and tenderness.  Latissimus dorsi left, hypertonicity and tenderness.  Lumbar: Lumbar paraspinals bilateral, muscular hypertonicity.    Segmental Joint(s): Segmental joint dysfunction was assessed with motion palpation and is identified in the following areas:  Cervical : C2 C5  Thoracic : T4 and T5  Lumbopelvic / Sacral SIJ : L5/S1 and R SIJ    Assessment/Plan   Today's Treatment Included: Chiropractic manipulation to the Segmental Joint(s) Cervical : C2 C5 Segmental Joint(s) Lumbopelvic/Sacral SIJ : L5/S1, R SIJ, and L SIJ Segmental Joint(s) Thoracic : T3, T4, and T5   Treatment Techniques Used : Diversified CMT, Pelvic drop table technique, and Manual traction  Neuromuscular Re-Education (82517): Start time: 3:10 pm End time: 3:40 pm  2 Units  Pin and stretch  Soft-Tissue manipulation  Integrative Dry Needling (IDN) - Needles in / out:  5.    Soft-tissue mobilization was performed in the following areas:   Thoracic Paraspinal mm. bilateral  Lumbar Paraspinal mm. bilateral and Quadratus Lumborum bilateral  Trapezius mm. Upper  and Middle  left, Levator Scap. left, Rhomboids left, Supraspinatus left, Infraspinatus left, and Deltoid mm. left    The patient tolerated today's treatment with little or no additional discomfort and was instructed to contact the office for questions or concerns.

## 2024-03-28 ENCOUNTER — EVALUATION (OUTPATIENT)
Dept: PHYSICAL THERAPY | Facility: CLINIC | Age: 19
End: 2024-03-28
Payer: COMMERCIAL

## 2024-03-28 DIAGNOSIS — M25.571 ACUTE RIGHT ANKLE PAIN: Primary | ICD-10-CM

## 2024-03-28 DIAGNOSIS — S93.431A ANKLE SYNDESMOSIS DISRUPTION, RIGHT, INITIAL ENCOUNTER: ICD-10-CM

## 2024-03-28 PROCEDURE — 97110 THERAPEUTIC EXERCISES: CPT | Mod: GP | Performed by: PHYSICAL THERAPIST

## 2024-03-28 PROCEDURE — 97161 PT EVAL LOW COMPLEX 20 MIN: CPT | Mod: GP | Performed by: PHYSICAL THERAPIST

## 2024-03-28 ASSESSMENT — ENCOUNTER SYMPTOMS
OCCASIONAL FEELINGS OF UNSTEADINESS: 0
DEPRESSION: 0

## 2024-03-29 NOTE — PROGRESS NOTES
Physical Therapy    Physical Therapy Evaluation and Treatment      Patient Name: Maribell Rosales  MRN: [id number]  Today's Date: 3/28/2024  Time Calculation  Start Time: 0830  Stop Time: 0920  Time Calculation (min): 50 min  PT Evaluation Time Entry  PT Evaluation (Low) Time Entry: 25  PT Therapeutic Procedures Time Entry  Manual Therapy Time Entry: 8  Therapeutic Exercise Time Entry: 10     Visit #1    Assessment:  Maribell is a 19 y.o. female presenting with c/o acute R ankle pain without specific incident. Exam shows TTP to anterior tib/fib ligament, ATFL, Achilles tendon and MLA, painful and restricted ankle joint mobility, mild ankle weakness, decreased length in R gastrocs, impaired gait, impaired balance and functional weakness. She is limited in weight-bearing activities and has stopped exercising secondary to pain. She is an excellent candidate for skilled PT to address these impairments and reduce pain to allow progression back to prior activity level.    Plan:  OP PT Plan  Treatment/Interventions: Cryotherapy, Dry needling, Education/ Instruction, Electrical stimulation, Hot pack, Manual therapy, Neuromuscular re-education, Therapeutic exercises, Vasopneumatic device  PT Plan: Skilled PT  PT Frequency: 1 time per week  Duration: 6-8 weeks  Onset Date: 01/15/24  Number of Treatments Authorized: 20  Rehab Potential: Excellent  Plan of Care Agreement: Patient    Current Problem:   1. Acute right ankle pain  Referral to Physical Therapy    Follow Up In Physical Therapy      2. Ankle syndesmosis disruption, right, initial encounter  Referral to Physical Therapy    Follow Up In Physical Therapy        General:  Reason for Referral: Right ankle pain  Referred By: Dr. Washington  Preferred Learning Style: verbal, visual, written    Subjective    Maribell c/o acute R ankle pain that began in mid-January without specific injury. She states she was out shopping and began to feel intense pain in ankle. She ignored pain for the  first few weeks but pain progressively worsened. She saw chiropractor and had dry needling which helped. She was evaluated by Dr. Washington and had x-rays which were negative for bony abnormality. Pain is worse with weight-bearing activities. She is unable to stand >30 mins before having to sit down. 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 She has stopped doing barre, HIIT, aerobic and weight-training classes. She is wearing an ankle brace and taking Advil/Motrin mainly at night. She also c/o intermittent swelling. She denies prior h/o R ankle injury.    Pt Goals: “Be able to workout without pain.”    Precautions:  Precautions  STEADI Fall Risk Score (The score of 4 or more indicates an increased risk of falling): 0    Pain:   8/10 pain currently, up to 10/10 after full day of school and work; achy and tight    Prior Level of Function:   Independent in all activities; very active    Objective   Palpation: TTP to anterior tib/fib ligament, ATFL, Achilles tendon and MLA    Observation: mild B pes planus; B calcaneal valgus    Gait: decreased RLE stance time; slow kirt    Single leg balance: R- 7-8 seconds; L- 30 seconds    Single heel raise: R- 1”; L 4.25”    Ankle AROM (R/L in degrees):   Dorsiflexion- 7/ 18  Plantarflexion- 50/ 70  Inversion- 44/ 46  Eversion- 10/ 20  *R ankle AROM painful in all directions    Ankle/Foot MMT (R/L):   Tibialis anterior- 4+/5; 5/5  Tibialis posterior- 4+/5; 5/5  Peroneals- 4+/5; 5/5  Soleus- 4+/5; 4+/5  Gastrocs- 5/5; 5/5    LE MMT (R/L):   Iliopsoas- 4+/5; 4+/5  Hip ER- 4+/5; 4+/5  Glute med- 4+/5; 4+/5  Glute max- 5/5; 5/5  Quadriceps- 5/5; 5/5  Hamstrings- 5/5; 5/5    Length tests (R/L in degrees):   Gastrocs- 0/ 10    Special tests:   Mild laxity with Anterior Drawer  (-) Inv talar tilt  (-) DF/ external rotation test    Outcome Measures:  Lowe Extremity Functional Scale: 30/80    Treatments:  Manual Therapy: 8 min  R talar distraction  Grade II  "R talar dorsal glides  PROM R ankle    Therapeutic Exercise: 10 min  Ankle pumps and circles x10 ea   Towel calf stretch 3x30 sec  Ankle DF and PF, yellow band, x10 ea    EDUCATION:  Issued/reviewed HEP to be performed 2x/day    Goals:  Active       PT Problem       Increase R ankle AROM to 15-20 degrees DF, 60-70 degrees PF and 20 degrees EV.        Start:  03/28/24    Expected End:  05/27/24            Increase R ankle MMT to 5/5 throughout and improve R single heel raise to at least 4\".       Start:  03/28/24    Expected End:  05/27/24            Increase length in R gastrocs to 10 degrees.       Start:  03/28/24    Expected End:  05/27/24            Pt will amb with proper HS/TO pattern and equal stance time/step length to restore normal gait pattern.       Start:  03/28/24    Expected End:  05/27/24            Pt will maintain R SLS for 30 seconds on firm surface without LOB.       Start:  03/28/24    Expected End:  05/27/24            Pt will report 0/10 R ankle pain to improve prolonged standing and walking, and allow return to exercise classes.       Start:  03/28/24    Expected End:  05/27/24            Improve LEFS by at least 9 points (MDIC).       Start:  03/28/24    Expected End:  05/27/24              "

## 2024-04-02 ENCOUNTER — TREATMENT (OUTPATIENT)
Dept: PHYSICAL THERAPY | Facility: CLINIC | Age: 19
End: 2024-04-02
Payer: COMMERCIAL

## 2024-04-02 ENCOUNTER — ALLIED HEALTH (OUTPATIENT)
Dept: INTEGRATIVE MEDICINE | Facility: CLINIC | Age: 19
End: 2024-04-02
Payer: COMMERCIAL

## 2024-04-02 DIAGNOSIS — M89.8X1 PAIN IN SCAPULA: ICD-10-CM

## 2024-04-02 DIAGNOSIS — M25.571 ACUTE RIGHT ANKLE PAIN: Primary | ICD-10-CM

## 2024-04-02 DIAGNOSIS — M54.59 POSTURAL LOW BACK PAIN: ICD-10-CM

## 2024-04-02 DIAGNOSIS — M79.9 POSTURAL STRAIN: ICD-10-CM

## 2024-04-02 DIAGNOSIS — S93.431A ANKLE SYNDESMOSIS DISRUPTION, RIGHT, INITIAL ENCOUNTER: ICD-10-CM

## 2024-04-02 DIAGNOSIS — M79.9 SOFT TISSUE DISORDER: ICD-10-CM

## 2024-04-02 DIAGNOSIS — M99.02 SEGMENTAL AND SOMATIC DYSFUNCTION OF THORACIC REGION: ICD-10-CM

## 2024-04-02 DIAGNOSIS — M99.04 SEGMENTAL AND SOMATIC DYSFUNCTION OF SACRAL REGION: ICD-10-CM

## 2024-04-02 DIAGNOSIS — M99.03 SEGMENTAL AND SOMATIC DYSFUNCTION OF LUMBAR REGION: ICD-10-CM

## 2024-04-02 DIAGNOSIS — M99.02 SEGMENTAL AND SOMATIC DYSFUNCTION OF THORACIC REGION: Primary | ICD-10-CM

## 2024-04-02 DIAGNOSIS — M99.01 SEGMENTAL AND SOMATIC DYSFUNCTION OF CERVICAL REGION: Primary | ICD-10-CM

## 2024-04-02 PROCEDURE — 98941 CHIROPRACT MANJ 3-4 REGIONS: CPT | Performed by: CHIROPRACTOR

## 2024-04-02 PROCEDURE — 97110 THERAPEUTIC EXERCISES: CPT | Mod: GP | Performed by: PHYSICAL THERAPIST

## 2024-04-02 PROCEDURE — 97112 NEUROMUSCULAR REEDUCATION: CPT | Performed by: CHIROPRACTOR

## 2024-04-02 PROCEDURE — 97014 ELECTRIC STIMULATION THERAPY: CPT | Mod: GP | Performed by: PHYSICAL THERAPIST

## 2024-04-02 PROCEDURE — 97140 MANUAL THERAPY 1/> REGIONS: CPT | Mod: GP | Performed by: PHYSICAL THERAPIST

## 2024-04-02 NOTE — PROGRESS NOTES
Subjective   Patient ID: Maribell Rosales is a 19 y.o. female who presents April 2, 2024 for chiropractic care.    [facility] - ED: 5/29/2024    Today, the patient rates their degree of pain as a 7 out of 10 on the numeric pain rating scale.     Maribell returns for continued chiropractic care. She reports continued discomfort in the ankle but is finding the physical therapy to be beneficial. She states that her shoulder continues to improve. The patient denies any changes in health since her last encounter and will follow up as scheduled.   ________________________________________  Initial exam:   Maribell Rosales presents for evaluation and treatment of left sided scapular pain and low back pain. She states that her left sided scapular pain started in mid-January. She denies any traumas, accidents, incidents, or injuries. She states that she has noticed a significant loss of strength in the left arm when compared to the right. She states that she predominately notices this decreased strength and some pain when performing lateral raises with weight. She reports that her secondary complaint of low back pain. She states that these symptoms have been present for years and occur intermittently. She states that standing for extended periods of time will aggravate her symptoms, causing a diffuse bilateral discomfort parallel to her belt line. She denies any radiating symptoms.     Maribell also reports a recent ankle injury. She states that that she was walking and suddenly felt pain along the the medial plantar surface of the foot, along the medial aspect of the achilles tendon and the lateral aspect of the achilles tendon. She states that her range of motion was decreased, she notes pain with palpation and difficulty with weight bearing movements. She denies any swelling or bruising.      Objective   Physical Exam  Neurological:      General: No focal deficit present.      Mental Status: She is alert and oriented to person, place,  and time.      Cranial Nerves: No dysarthria or facial asymmetry.      Sensory: Sensation is intact.      Motor: Motor function is intact.      Coordination: Coordination is intact.      Gait: Gait is intact.       Palpation of the following region(s) revealed:  Cervical: Upper trapezius left, hypertonicity and tenderness.  Levator scapulae left, hypertonicity and tenderness.  Thoracic: Thoracic paraspinals left, hypertonicity and tenderness.  Middle trapezius left, hypertonicity and tenderness.  Rhomboids left, hypertonicity and tenderness.  Latissimus dorsi left, hypertonicity and tenderness.  Lumbar: Lumbar paraspinals bilateral, muscular hypertonicity.    Segmental Joint(s): Segmental joint dysfunction was assessed with motion palpation and is identified in the following areas:  Cervical : C2 C5  Thoracic : T4 and T5  Lumbopelvic / Sacral SIJ : L5/S1 and R SIJ    Assessment/Plan   Today's Treatment Included: Chiropractic manipulation to the Segmental Joint(s) Cervical : C2 C5 Segmental Joint(s) Lumbopelvic/Sacral SIJ : L5/S1, R SIJ, and L SIJ Segmental Joint(s) Thoracic : T3, T4, and T5   Treatment Techniques Used : Diversified CMT, Pelvic drop table technique, and Manual traction  Neuromuscular Re-Education (50311): Start time: 9:00 am End time: 9:30 am  2 Units  Pin and stretch  Soft-Tissue manipulation  Integrative Dry Needling (IDN) - Needles in / out:  5.    Soft-tissue mobilization was performed in the following areas:   Thoracic Paraspinal mm. bilateral  Lumbar Paraspinal mm. bilateral and Quadratus Lumborum bilateral  Trapezius mm. Upper  and Middle  left, Levator Scap. left, Rhomboids left, Supraspinatus left, Infraspinatus left, and Deltoid mm. left    The patient tolerated today's treatment with little or no additional discomfort and was instructed to contact the office for questions or concerns.

## 2024-04-02 NOTE — PROGRESS NOTES
"Physical Therapy    Physical Therapy Treatment    Patient Name: Maribell Rosales  MRN: [id number]  Today's Date: 4/2/2024  Time Calculation  Start Time: 0730  Stop Time: 0815  Time Calculation (min): 45 min     PT Therapeutic Procedures Time Entry  Manual Therapy Time Entry: 10  Therapeutic Exercise Time Entry: 20  PT Modalities Time Entry  E-Stim (Unattended) Time Entry: 10  Visit #2    Assessment:  Symptoms aggravated with active DF. Encouraged to limit ankle DF ROM with home exercises to avoid irritation of pain. Shows good pain response by end of session.    Plan:  Continue ankle AROM and strengthening within tolerance, IFC at end of session.    Current Problem  1. Acute right ankle pain        2. Ankle syndesmosis disruption, right, initial encounter            Subjective    \"I'm doing the exercises every day and it seems like my ankle is getting more swollen. The pain is about the same.\"    Pain   8/10 pre-tx, 3/10 post-tx    Objective   Restricted R talar dorsal glides    Treatments:  Manual Therapy: 10 min  R talar distraction  R calcaneal rocking  Grade II R talar dorsal glides     Therapeutic Exercise: 20 min  Ankle pumps and circles x10 ea   Towel calf stretch 3x30 sec  4-way ankle, yellow band, x10 ea  Seated calf raises 2x10  Seated BAPS L1 PF/DF, INV/EV x10 ea    Modalities: 10 min  IFC acute protocol w/ CP to anterior ankle    Goals:  Active       PT Problem       Increase R ankle AROM to 15-20 degrees DF, 60-70 degrees PF and 20 degrees EV.        Start:  03/28/24    Expected End:  05/27/24            Increase R ankle MMT to 5/5 throughout and improve R single heel raise to at least 4\".       Start:  03/28/24    Expected End:  05/27/24            Increase length in R gastrocs to 10 degrees.       Start:  03/28/24    Expected End:  05/27/24            Pt will amb with proper HS/TO pattern and equal stance time/step length to restore normal gait pattern.       Start:  03/28/24    Expected End:  05/27/24   "          Pt will maintain R SLS for 30 seconds on firm surface without LOB.       Start:  03/28/24    Expected End:  05/27/24            Pt will report 0/10 R ankle pain to improve prolonged standing and walking, and allow return to exercise classes.       Start:  03/28/24    Expected End:  05/27/24            Improve LEFS by at least 9 points (MDIC).       Start:  03/28/24    Expected End:  05/27/24

## 2024-04-09 ENCOUNTER — TREATMENT (OUTPATIENT)
Dept: PHYSICAL THERAPY | Facility: CLINIC | Age: 19
End: 2024-04-09
Payer: COMMERCIAL

## 2024-04-09 DIAGNOSIS — S93.431A ANKLE SYNDESMOSIS DISRUPTION, RIGHT, INITIAL ENCOUNTER: ICD-10-CM

## 2024-04-09 DIAGNOSIS — M25.571 ACUTE RIGHT ANKLE PAIN: Primary | ICD-10-CM

## 2024-04-09 PROCEDURE — 97110 THERAPEUTIC EXERCISES: CPT | Mod: GP | Performed by: PHYSICAL THERAPIST

## 2024-04-09 PROCEDURE — 97014 ELECTRIC STIMULATION THERAPY: CPT | Mod: GP | Performed by: PHYSICAL THERAPIST

## 2024-04-09 NOTE — PROGRESS NOTES
"Physical Therapy    Physical Therapy Treatment    Patient Name: Maribell Rosales  MRN: [id number]  Today's Date: 4/9/2024  Time Calculation  Start Time: 0735  Stop Time: 0820  Time Calculation (min): 45 min     PT Therapeutic Procedures Time Entry  Manual Therapy Time Entry: 10  Therapeutic Exercise Time Entry: 25  PT Modalities Time Entry  E-Stim (Unattended) Time Entry: 10  Visit #3    Assessment:  Shows good pain response to estim. Slightly more ankle ROM with most exercises prior to worsening of pain.     Plan:  Continue ankle AROM and strengthening within tolerance, IFC at end of session.    Current Problem  1. Acute right ankle pain        2. Ankle syndesmosis disruption, right, initial encounter            Subjective    \"This week was great until last night. The pain was like a 6-8/10. The first few hours after last session were great.\"    Pain   10/10 pre-tx, \"a lot less\" post-tx    Objective   Restricted R talar dorsal glides    Treatments:  Manual Therapy: 10 min  R talar distraction  R calcaneal rocking  Grade II R talar dorsal glides     Therapeutic Exercise: 25 min  Ankle pumps and circles x10 ea   Towel calf stretch 3x30 sec  4-way ankle, yellow band, x10 ea  Seated calf and toe raises 2x10 ea  Seated BAPS L1 PF/DF, INV/EV, CW, CCW 2x10 ea  Bike x1 1/2 mins (stopped secondary to increasing pain.    Modalities: 10 min  IFC acute protocol w/ CP to anterior ankle    Goals:  Active       PT Problem       Increase R ankle AROM to 15-20 degrees DF, 60-70 degrees PF and 20 degrees EV.        Start:  03/28/24    Expected End:  05/27/24            Increase R ankle MMT to 5/5 throughout and improve R single heel raise to at least 4\".       Start:  03/28/24    Expected End:  05/27/24            Increase length in R gastrocs to 10 degrees.       Start:  03/28/24    Expected End:  05/27/24            Pt will amb with proper HS/TO pattern and equal stance time/step length to restore normal gait pattern.       Start:  " 03/28/24    Expected End:  05/27/24            Pt will maintain R SLS for 30 seconds on firm surface without LOB.       Start:  03/28/24    Expected End:  05/27/24            Pt will report 0/10 R ankle pain to improve prolonged standing and walking, and allow return to exercise classes.       Start:  03/28/24    Expected End:  05/27/24            Improve LEFS by at least 9 points (MDIC).       Start:  03/28/24    Expected End:  05/27/24

## 2024-04-16 ENCOUNTER — TREATMENT (OUTPATIENT)
Dept: PHYSICAL THERAPY | Facility: CLINIC | Age: 19
End: 2024-04-16
Payer: COMMERCIAL

## 2024-04-16 DIAGNOSIS — M25.571 ACUTE RIGHT ANKLE PAIN: Primary | ICD-10-CM

## 2024-04-16 DIAGNOSIS — S93.431A ANKLE SYNDESMOSIS DISRUPTION, RIGHT, INITIAL ENCOUNTER: ICD-10-CM

## 2024-04-16 PROCEDURE — 97014 ELECTRIC STIMULATION THERAPY: CPT | Mod: GP | Performed by: PHYSICAL THERAPIST

## 2024-04-16 PROCEDURE — 97110 THERAPEUTIC EXERCISES: CPT | Mod: GP | Performed by: PHYSICAL THERAPIST

## 2024-04-16 NOTE — PROGRESS NOTES
"Physical Therapy    Physical Therapy Treatment    Patient Name: Maribell Rosales  MRN: [id number]  Today's Date: 4/16/2024  Time Calculation  Start Time: 0735  Stop Time: 0825  Time Calculation (min): 50 min     PT Therapeutic Procedures Time Entry  Manual Therapy Time Entry: 10  Therapeutic Exercise Time Entry: 25  PT Modalities Time Entry  E-Stim (Unattended) Time Entry: 10  Visit #4    Assessment:  Shows good pain response during today's session. Able to progress to shuttle press with cues to stay within pain-free DF range.    Plan:  Continue to progress towards weight-bearing ex as pt tolerates.    Current Problem  1. Acute right ankle pain        2. Ankle syndesmosis disruption, right, initial encounter            Subjective    \"The beginning of last week the ankle pain was unbearable. The pain was making me nauseous. Something happened and it stopped so it feels better now.\"    Pain   6/10 pre-tx, 0/10 post-tx    Objective   R ankle AROM in long sitting: (5) degrees DF, 75 degrees PF; Able to passively DF ankle to neutral    Treatments:  Manual Therapy: 10 min  R talar distraction  R calcaneal rocking  Grade II R talar dorsal glides     Therapeutic Exercise: 25 min  Ankle pumps and circles x10 ea   Towel calf stretch 3x30 sec  4-way ankle, yellow band, x10 ea  Seated calf and toe raises 2x10 ea  Seated DF slides 2x10  Seated BAPS L1 PF/DF, INV/EV, CW, CCW 2x10 ea  Shuttle DL press 2 bands x30    Modalities: 10 min  IFC acute protocol w/ CP to anterior ankle x10 mins    Goals:  Active       PT Problem       Increase R ankle AROM to 15-20 degrees DF, 60-70 degrees PF and 20 degrees EV.        Start:  03/28/24    Expected End:  05/27/24            Increase R ankle MMT to 5/5 throughout and improve R single heel raise to at least 4\".       Start:  03/28/24    Expected End:  05/27/24            Increase length in R gastrocs to 10 degrees.       Start:  03/28/24    Expected End:  05/27/24            Pt will amb with " proper HS/TO pattern and equal stance time/step length to restore normal gait pattern.       Start:  03/28/24    Expected End:  05/27/24            Pt will maintain R SLS for 30 seconds on firm surface without LOB.       Start:  03/28/24    Expected End:  05/27/24            Pt will report 0/10 R ankle pain to improve prolonged standing and walking, and allow return to exercise classes.       Start:  03/28/24    Expected End:  05/27/24            Improve LEFS by at least 9 points (MDIC).       Start:  03/28/24    Expected End:  05/27/24

## 2024-04-18 ENCOUNTER — ALLIED HEALTH (OUTPATIENT)
Dept: INTEGRATIVE MEDICINE | Facility: CLINIC | Age: 19
End: 2024-04-18
Payer: COMMERCIAL

## 2024-04-18 DIAGNOSIS — M54.59 POSTURAL LOW BACK PAIN: ICD-10-CM

## 2024-04-18 DIAGNOSIS — M99.04 SEGMENTAL AND SOMATIC DYSFUNCTION OF SACRAL REGION: ICD-10-CM

## 2024-04-18 DIAGNOSIS — M99.03 SEGMENTAL AND SOMATIC DYSFUNCTION OF LUMBAR REGION: ICD-10-CM

## 2024-04-18 DIAGNOSIS — M99.01 SEGMENTAL AND SOMATIC DYSFUNCTION OF CERVICAL REGION: ICD-10-CM

## 2024-04-18 DIAGNOSIS — M99.02 SEGMENTAL AND SOMATIC DYSFUNCTION OF THORACIC REGION: Primary | ICD-10-CM

## 2024-04-18 DIAGNOSIS — M79.9 POSTURAL STRAIN: ICD-10-CM

## 2024-04-18 DIAGNOSIS — M89.8X1 PAIN IN SCAPULA: ICD-10-CM

## 2024-04-18 PROCEDURE — 98941 CHIROPRACT MANJ 3-4 REGIONS: CPT | Performed by: CHIROPRACTOR

## 2024-04-18 PROCEDURE — 97112 NEUROMUSCULAR REEDUCATION: CPT | Performed by: CHIROPRACTOR

## 2024-04-18 NOTE — PROGRESS NOTES
Subjective   Patient ID: Maribell Rosales is a 19 y.o. female who presents April 18, 2024 for chiropractic care.    [facility] - ED: 5/29/2024    Today, the patient rates their degree of pain as a 7 out of 10 on the numeric pain rating scale.     Maribell returns for continued chiropractic care. She reports that her ankle pain is intermittent. She states that her ankle pain has improved but has not completely gone away. She states that her shoulder continues to do well. The patient denies any changes in health since her last encounter and will follow up as scheduled.   ________________________________________  Initial exam:   Maribell Rosales presents for evaluation and treatment of left sided scapular pain and low back pain. She states that her left sided scapular pain started in mid-January. She denies any traumas, accidents, incidents, or injuries. She states that she has noticed a significant loss of strength in the left arm when compared to the right. She states that she predominately notices this decreased strength and some pain when performing lateral raises with weight. She reports that her secondary complaint of low back pain. She states that these symptoms have been present for years and occur intermittently. She states that standing for extended periods of time will aggravate her symptoms, causing a diffuse bilateral discomfort parallel to her belt line. She denies any radiating symptoms.     Maribell also reports a recent ankle injury. She states that that she was walking and suddenly felt pain along the the medial plantar surface of the foot, along the medial aspect of the achilles tendon and the lateral aspect of the achilles tendon. She states that her range of motion was decreased, she notes pain with palpation and difficulty with weight bearing movements. She denies any swelling or bruising.      Objective   Physical Exam  Neurological:      General: No focal deficit present.      Mental Status: She is alert and  oriented to person, place, and time.      Cranial Nerves: No dysarthria or facial asymmetry.      Sensory: Sensation is intact.      Motor: Motor function is intact.      Coordination: Coordination is intact.      Gait: Gait is intact.       Palpation of the following region(s) revealed:  Cervical: Upper trapezius left, hypertonicity and tenderness.  Levator scapulae left, hypertonicity and tenderness.  Thoracic: Thoracic paraspinals left, hypertonicity and tenderness.  Middle trapezius left, hypertonicity and tenderness.  Rhomboids left, hypertonicity and tenderness.  Latissimus dorsi left, hypertonicity and tenderness.  Lumbar: Lumbar paraspinals bilateral, muscular hypertonicity.    Segmental Joint(s): Segmental joint dysfunction was assessed with motion palpation and is identified in the following areas:  Cervical : C2 C5  Thoracic : T4 and T5  Lumbopelvic / Sacral SIJ : L5/S1 and R SIJ    Assessment/Plan   Today's Treatment Included: Chiropractic manipulation to the Segmental Joint(s) Cervical : C2 C5 Segmental Joint(s) Lumbopelvic/Sacral SIJ : L5/S1, R SIJ, and L SIJ Segmental Joint(s) Thoracic : T3, T4, and T5   Treatment Techniques Used : Diversified CMT, Pelvic drop table technique, and Manual traction  Neuromuscular Re-Education (09727): Start time: 8:30 am End time: 9:00 am  2 Units  Pin and stretch  Soft-Tissue manipulation  Integrative Dry Needling (IDN) - Needles in / out:  5.    Soft-tissue mobilization was performed in the following areas:   Thoracic Paraspinal mm. bilateral  Lumbar Paraspinal mm. bilateral and Quadratus Lumborum bilateral  Trapezius mm. Upper  and Middle  left, Levator Scap. left, Rhomboids left, Supraspinatus left, Infraspinatus left, and Deltoid mm. left    The patient tolerated today's treatment with little or no additional discomfort and was instructed to contact the office for questions or concerns.

## 2024-04-23 ENCOUNTER — APPOINTMENT (OUTPATIENT)
Dept: PHYSICAL THERAPY | Facility: CLINIC | Age: 19
End: 2024-04-23
Payer: COMMERCIAL

## 2024-04-23 ENCOUNTER — APPOINTMENT (OUTPATIENT)
Dept: ORTHOPEDIC SURGERY | Facility: CLINIC | Age: 19
End: 2024-04-23
Payer: COMMERCIAL

## 2024-04-25 ENCOUNTER — APPOINTMENT (OUTPATIENT)
Dept: PHYSICAL THERAPY | Facility: CLINIC | Age: 19
End: 2024-04-25
Payer: COMMERCIAL

## 2024-04-25 ENCOUNTER — OFFICE VISIT (OUTPATIENT)
Dept: ORTHOPEDIC SURGERY | Facility: CLINIC | Age: 19
End: 2024-04-25
Payer: COMMERCIAL

## 2024-04-25 DIAGNOSIS — M25.571 ACUTE RIGHT ANKLE PAIN: Primary | ICD-10-CM

## 2024-04-25 PROCEDURE — 20605 DRAIN/INJ JOINT/BURSA W/O US: CPT | Mod: RT | Performed by: STUDENT IN AN ORGANIZED HEALTH CARE EDUCATION/TRAINING PROGRAM

## 2024-04-25 PROCEDURE — 2500000004 HC RX 250 GENERAL PHARMACY W/ HCPCS (ALT 636 FOR OP/ED): Performed by: STUDENT IN AN ORGANIZED HEALTH CARE EDUCATION/TRAINING PROGRAM

## 2024-04-25 PROCEDURE — 2500000005 HC RX 250 GENERAL PHARMACY W/O HCPCS: Performed by: STUDENT IN AN ORGANIZED HEALTH CARE EDUCATION/TRAINING PROGRAM

## 2024-04-25 PROCEDURE — 99214 OFFICE O/P EST MOD 30 MIN: CPT | Performed by: STUDENT IN AN ORGANIZED HEALTH CARE EDUCATION/TRAINING PROGRAM

## 2024-04-25 PROCEDURE — 1036F TOBACCO NON-USER: CPT | Performed by: STUDENT IN AN ORGANIZED HEALTH CARE EDUCATION/TRAINING PROGRAM

## 2024-04-25 PROCEDURE — 20605 DRAIN/INJ JOINT/BURSA W/O US: CPT | Performed by: STUDENT IN AN ORGANIZED HEALTH CARE EDUCATION/TRAINING PROGRAM

## 2024-04-25 RX ORDER — TRIAMCINOLONE ACETONIDE 40 MG/ML
40 INJECTION, SUSPENSION INTRA-ARTICULAR; INTRAMUSCULAR
Status: COMPLETED | OUTPATIENT
Start: 2024-04-25 | End: 2024-04-25

## 2024-04-25 RX ORDER — LIDOCAINE HYDROCHLORIDE 10 MG/ML
2 INJECTION INFILTRATION; PERINEURAL
Status: COMPLETED | OUTPATIENT
Start: 2024-04-25 | End: 2024-04-25

## 2024-04-25 RX ADMIN — TRIAMCINOLONE ACETONIDE 40 MG: 200 INJECTION, SUSPENSION INTRA-ARTICULAR; INTRAMUSCULAR at 10:30

## 2024-04-25 RX ADMIN — LIDOCAINE HYDROCHLORIDE 2 ML: 10 INJECTION, SOLUTION INFILTRATION; PERINEURAL at 10:30

## 2024-04-25 ASSESSMENT — PAIN SCALES - GENERAL: PAINLEVEL_OUTOF10: 5 - MODERATE PAIN

## 2024-04-25 ASSESSMENT — PAIN - FUNCTIONAL ASSESSMENT: PAIN_FUNCTIONAL_ASSESSMENT: 0-10

## 2024-04-25 ASSESSMENT — PAIN DESCRIPTION - DESCRIPTORS: DESCRIPTORS: SHARP

## 2024-04-25 NOTE — PROGRESS NOTES
ORTHOPAEDIC SURGERY PROGRESS NOTE    Chief Complaint:  Right ankle pain    History Of Present Illness  Maribell Rosales is a 19 y.o. female who presents for evaluation of right ankle pain.  Patient reports the atraumatic onset of right ankle pain while walking approximately 6 weeks ago.  Patient denies specific injury or change in her activity or shoewear.  Pain is made worse with walking and standing.  She has never had a pain like this in the past.  She has tried dry needling as well as over-the-counter bracing and limiting her activity with persistent pain.  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  This has been difficult for her as well is working out.  She denies any associated numbness, tingling or weakness.    04/25/2024: Patient returns for follow-up of her right ankle injury.  She has been in physical therapy and noting improvements with respect to her instability.  She reports that overall she is improving but continues with moderate pain in her ankle rated as 5 out of 10.  She has not had any recurrent brendon instability events.     Past Medical History  Past Medical History:   Diagnosis Date    Allergy to milk products 01/30/2019    History of allergy to milk products    Unspecified asthma, uncomplicated ([facility]) 01/15/2018    Childhood asthma       Surgical History  Recent Surgeries in Orthopaedic Surgery            No cases to display             Social History  Social History     Socioeconomic History    Marital status: Single     Spouse name: None    Number of children: None    Years of education: None    Highest education level: None   Occupational History    None   Tobacco Use    Smoking status: Never     Passive exposure: Never    Smokeless tobacco: Never   Substance and Sexual Activity    Alcohol use: Never    Drug use: Never    Sexual activity: None   Other Topics Concern    None   Social History Narrative    None     Social Determinants of Health     Financial  Resource Strain: Not on file   Food Insecurity: Not on file   Transportation Needs: Not on file   Physical Activity: Not on file   Stress: Not on file   Social Connections: Not on file   Intimate Partner Violence: Not on file   Housing Stability: Not on file       Family History  No family history on file.     Allergies  No Known Allergies    Review of Systems  REVIEW OF SYSTEMS  Constitutional: no unplanned weight loss  Psychiatric: no suicidal ideation  ENT: no vision changes, no sinus problems  Pulmonary: no shortness of breath  Lymphatic: no enlarged lymph nodes  Cardiovascular: no chest pain or shortness of breath  Gastrointestinal: no stomach problems  Genitourinary: no dysuria   Skin: no rashes  Endocrine: no thyroid problems  Neurological: no headache, no numbness  Hematological: no easy bruising  Musculoskeletal: Right ankle pain    Physical Exam  PHYSICAL EXAMINATION  Constitutional Exam: well developed and well nourished  Psychiatric Exam: alert and oriented, appropriate mood and behavior  Eye Exam: EOMI  Pulmonary Exam: breathing non-labored, no apparent distress  Lymphatic exam: no appreciable lymphadenopathy in the lower extremities  Cardiovascular exam: RRR to peripheral palpation, DP pulses 2+, PT 2+, toes are pink with good capillary refill, no pitting edema  Skin exam: no open lesions, rashes, abrasions or ulcerations  Neurological exam: sensation to light touch intact in both lower extremities in peripheral and dermatomal distributions (except for any abnormalities noted in musculoskeletal exam)    Musculoskeletal exam: Right lower extremity examination.  Patient pain continues to localize to the anterolateral ankle.  She is tender to palpation overlying the ATFL/AITFL.  Nontender to palpation at the superficial and deep deltoid.  Patient has physiologic hindfoot valgus with neutral arch posture and no significant forefoot deformity noted.  Patient has relatively pain-free and supple ankle,  subtalar and midtarsal joint range of motion.  She has sensation intact light touch grossly in a saphenous, sural, superficial peroneal, deep peroneal and tibial nerve distribution.  She has 5 out of 5 strength in plantarflexion, dorsiflexion and EHL.  She has 2+ DP/PT pulse palpated.  She has a positive anterior drawer, negative talar tilt.  Negative syndesmotic squeeze test and negative dorsiflexion external rotation stress test.  She does have a positive anterior lateral impingement test.    Last Recorded Vitals  There were no vitals taken for this visit.    Laboratory Results    No results found for this or any previous visit (from the past 24 hour(s)).    Radiology Results   No new imaging at this visit.    Patient ID: Maribell Rosales is a 19 y.o. female.    M Inj/Asp on 4/25/2024 10:30 AM  Details: 22 G needle  Medications: 2 mL lidocaine 10 mg/mL (1 %); 40 mg triamcinolone acetonide 40 mg/mL    I reviewed the risks and benefits of right TT CSI injection with the patient.  Risks including pain, bleeding, infection, hypopigmentation of the skin, loss of subcutaneous fat, metabolic complications and possibility that further injections may not be effective.  The patient gave informed consent for the procedure.       A time-out was called and confirmed identifying the right TT as the site of injection.  Sterile skin preparation was made over the right TT, just medial to the palpable TA tendon and the soft spot.  Then, using sterile technique, the right TT was injected with 2 cc of 1% lidocaine and 1cc of kenalog 40 mg with no complications.   The patient was given post-procedure instructions and precautions.  I personally performed the procedure.              Assessment/Plan:  19-year-old female who in my impression has right ankle injury and pain likely stemming from ankle sprain, possible syndesmotic injury and suspected anterolateral impingement.  I have reviewed the diagnosis and treatment options extensively  with the patient.  In my impression the patient may continue weightbearing as tolerated in her right lower extremity.  She will continue with her lace up style brace wear and physical therapy.  I discussed the diagnostic and therapeutic benefits of a right TT CSI, please see my separate procedure note.  Patient reported pain relief with the procedure.  I will plan to see the patient back in approximately 6 weeks for repeat clinical evaluation.  If the patient not clinically improving, may consider right ankle MRI to evaluate the lateral ligament complex, syndesmosis and rule out talar OCD.  Upon return, patient would not require further imaging.    Antwon Washington MD, BINTA  Department of Orthopaedic Surgery  [facility]    The diagnosis and treatment plan were reviewed with the patient. All questions were answered. The patient verbalized understanding of the treatment plan. There were no barriers to understanding identified.    Note dictated with [employer] ([employer]) software.  Completed without full type editing and sent to avoid delay.

## 2024-04-30 ENCOUNTER — APPOINTMENT (OUTPATIENT)
Dept: INTEGRATIVE MEDICINE | Facility: CLINIC | Age: 19
End: 2024-04-30
Payer: COMMERCIAL

## 2024-05-02 ENCOUNTER — TREATMENT (OUTPATIENT)
Dept: PHYSICAL THERAPY | Facility: CLINIC | Age: 19
End: 2024-05-02
Payer: COMMERCIAL

## 2024-05-02 DIAGNOSIS — M25.571 ACUTE RIGHT ANKLE PAIN: Primary | ICD-10-CM

## 2024-05-02 DIAGNOSIS — S93.431A ANKLE SYNDESMOSIS DISRUPTION, RIGHT, INITIAL ENCOUNTER: ICD-10-CM

## 2024-05-02 PROCEDURE — 97140 MANUAL THERAPY 1/> REGIONS: CPT | Mod: GP | Performed by: PHYSICAL THERAPIST

## 2024-05-02 PROCEDURE — 97014 ELECTRIC STIMULATION THERAPY: CPT | Mod: GP | Performed by: PHYSICAL THERAPIST

## 2024-05-02 PROCEDURE — 97110 THERAPEUTIC EXERCISES: CPT | Mod: GP | Performed by: PHYSICAL THERAPIST

## 2024-05-02 NOTE — PROGRESS NOTES
"Physical Therapy    Physical Therapy Treatment    Patient Name: Maribell Rosales  MRN: [id number]  Today's Date: 5/2/2024  Time Calculation  Start Time: 0240  Stop Time: 0330  Time Calculation (min): 50 min     PT Therapeutic Procedures Time Entry  Manual Therapy Time Entry: 10  Therapeutic Exercise Time Entry: 30  PT Modalities Time Entry  E-Stim (Unattended) Time Entry: 10  Insurance: [facility]  No auth required  PT visit limit: 20  Visit #5    Assessment:  Able to progress weight-bearing exercises today. Pt cued to perform semi circles on BAPS to avoid ankle pain (pain most pronounced during pronation).    Plan:  Continue progression of weight-bearing ex as pain allows.    Current Problem  1. Acute right ankle pain  Follow Up In Physical Therapy      2. Ankle syndesmosis disruption, right, initial encounter  Follow Up In Physical Therapy          Subjective    Pt saw ortho and had injection. Pain is less frequent since injection but still has episodes of high levels of pain. Pt was able to walk 15 mins to and from campus today without pain.    Pain   0/10 pre-tx    Objective   TTP to anterolateral ankle    Treatments:  Manual Therapy:  R talar distraction  R calcaneal rocking  Grade II R talar dorsal glides     Therapeutic Exercise:  Bike x 5 mins  Towel calf stretch 3x30 sec  4-way ankle, yellow band, x20 ea  Calf 2x10  Small rockerboard PF/DF 2x10 ea way  BAPS L1 PF/DF, INV/EV, CW, CCW 2x10 ea (LLE on 4\" box)  Shuttle DL press 2 bands x30    Modalities: 10 min  IFC acute protocol to anterior ankle x10 mins    Goals:  Active       PT Problem       Increase R ankle AROM to 15-20 degrees DF, 60-70 degrees PF and 20 degrees EV.        Start:  03/28/24    Expected End:  05/27/24            Increase R ankle MMT to 5/5 throughout and improve R single heel raise to at least 4\".       Start:  03/28/24    Expected End:  05/27/24            Increase length in R gastrocs to 10 degrees.       Start:  03/28/24    Expected End:  " 05/27/24            Pt will amb with proper HS/TO pattern and equal stance time/step length to restore normal gait pattern.       Start:  03/28/24    Expected End:  05/27/24            Pt will maintain R SLS for 30 seconds on firm surface without LOB.       Start:  03/28/24    Expected End:  05/27/24            Pt will report 0/10 R ankle pain to improve prolonged standing and walking, and allow return to exercise classes.       Start:  03/28/24    Expected End:  05/27/24            Improve LEFS by at least 9 points (MDIC).       Start:  03/28/24    Expected End:  05/27/24

## 2024-05-16 ENCOUNTER — TREATMENT (OUTPATIENT)
Dept: PHYSICAL THERAPY | Facility: CLINIC | Age: 19
End: 2024-05-16
Payer: COMMERCIAL

## 2024-05-16 DIAGNOSIS — M25.571 ACUTE RIGHT ANKLE PAIN: Primary | ICD-10-CM

## 2024-05-16 DIAGNOSIS — S93.431A ANKLE SYNDESMOSIS DISRUPTION, RIGHT, INITIAL ENCOUNTER: ICD-10-CM

## 2024-05-16 PROCEDURE — 97140 MANUAL THERAPY 1/> REGIONS: CPT | Mod: GP | Performed by: PHYSICAL THERAPIST

## 2024-05-16 PROCEDURE — 97110 THERAPEUTIC EXERCISES: CPT | Mod: GP | Performed by: PHYSICAL THERAPIST

## 2024-05-16 NOTE — PROGRESS NOTES
"Physical Therapy    Physical Therapy Treatment    Patient Name: Maribell Rosales  MRN: [id number]  Today's Date: 5/16/2024  Time Calculation  Start Time: 0620  Stop Time: 0700  Time Calculation (min): 40 min     PT Therapeutic Procedures Time Entry  Manual Therapy Time Entry: 10  Therapeutic Exercise Time Entry: 30     Insurance: [facility]  No auth required  PT visit limit: 20  Visit #6    Assessment:  Talocrural and subtalar joint mobility improved from first visit, however continues to be painful.    Plan:  Pt to follow up with ortho in a few weeks. Continue HEP independently until then.    Current Problem  1. Acute right ankle pain  Follow Up In Physical Therapy      2. Ankle syndesmosis disruption, right, initial encounter  Follow Up In Physical Therapy          Subjective    \"Strength and mobility are fine but the pain is coming back. I was at a wedding last week and the pain has been bad since.\"    Pain   3/10     Objective   R ankle AROM:   13 degrees DF *painful  70 degrees PF  44 degrees INV *painful  20 degrees EV *painful    Treatments:  Manual Therapy:  R talar distraction  R calcaneal rocking  Grade II R talar dorsal glides     Therapeutic Exercise:  Towel calf stretch 3x30 sec  4-way ankle, yellow band, x20 ea  Calf raises 2x10  Small rockerboard PF/DF 2x10 ea way  BAPS L2 PF/DF, INV/EV, CW, CCW 2x10 ea (LLE on 4\" box)  Shuttle DL press 2 bands x30  Bike x 5 mins    Goals:  Active       PT Problem       Increase R ankle AROM to 15-20 degrees DF, 60-70 degrees PF and 20 degrees EV.        Start:  03/28/24    Expected End:  05/27/24            Increase R ankle MMT to 5/5 throughout and improve R single heel raise to at least 4\".       Start:  03/28/24    Expected End:  05/27/24            Increase length in R gastrocs to 10 degrees.       Start:  03/28/24    Expected End:  05/27/24            Pt will amb with proper HS/TO pattern and equal stance time/step length to restore normal gait pattern.       Start:  " 03/28/24    Expected End:  05/27/24            Pt will maintain R SLS for 30 seconds on firm surface without LOB.       Start:  03/28/24    Expected End:  05/27/24            Pt will report 0/10 R ankle pain to improve prolonged standing and walking, and allow return to exercise classes.       Start:  03/28/24    Expected End:  05/27/24            Improve LEFS by at least 9 points (MDIC).       Start:  03/28/24    Expected End:  05/27/24

## 2024-05-20 DIAGNOSIS — S93.431A ANKLE SYNDESMOSIS DISRUPTION, RIGHT, INITIAL ENCOUNTER: ICD-10-CM

## 2024-05-20 DIAGNOSIS — M25.571 ACUTE RIGHT ANKLE PAIN: ICD-10-CM

## 2024-05-30 ENCOUNTER — APPOINTMENT (OUTPATIENT)
Dept: PHYSICAL THERAPY | Facility: CLINIC | Age: 19
End: 2024-05-30
Payer: COMMERCIAL

## 2024-06-03 ENCOUNTER — HOSPITAL ENCOUNTER (OUTPATIENT)
Dept: RADIOLOGY | Facility: CLINIC | Age: 19
End: 2024-06-03
Payer: COMMERCIAL

## 2024-06-10 ENCOUNTER — HOSPITAL ENCOUNTER (OUTPATIENT)
Dept: RADIOLOGY | Facility: CLINIC | Age: 19
Discharge: HOME | End: 2024-06-10
Payer: COMMERCIAL

## 2024-06-10 DIAGNOSIS — M25.571 ACUTE RIGHT ANKLE PAIN: ICD-10-CM

## 2024-06-10 DIAGNOSIS — S93.431A ANKLE SYNDESMOSIS DISRUPTION, RIGHT, INITIAL ENCOUNTER: ICD-10-CM

## 2024-06-10 PROCEDURE — 73721 MRI JNT OF LWR EXTRE W/O DYE: CPT | Mod: RIGHT SIDE | Performed by: RADIOLOGY

## 2024-06-10 PROCEDURE — 73721 MRI JNT OF LWR EXTRE W/O DYE: CPT | Mod: RT

## 2024-06-11 ENCOUNTER — OFFICE VISIT (OUTPATIENT)
Dept: ORTHOPEDIC SURGERY | Facility: CLINIC | Age: 19
End: 2024-06-11
Payer: COMMERCIAL

## 2024-06-11 DIAGNOSIS — M25.571 ACUTE RIGHT ANKLE PAIN: ICD-10-CM

## 2024-06-11 DIAGNOSIS — S93.431A ANKLE SYNDESMOSIS DISRUPTION, RIGHT, INITIAL ENCOUNTER: Primary | ICD-10-CM

## 2024-06-11 PROCEDURE — 99213 OFFICE O/P EST LOW 20 MIN: CPT | Performed by: STUDENT IN AN ORGANIZED HEALTH CARE EDUCATION/TRAINING PROGRAM

## 2024-06-11 ASSESSMENT — PAIN SCALES - GENERAL: PAINLEVEL_OUTOF10: 8

## 2024-06-11 ASSESSMENT — PAIN DESCRIPTION - DESCRIPTORS: DESCRIPTORS: ACHING;SHOOTING;SHARP

## 2024-06-11 ASSESSMENT — PAIN - FUNCTIONAL ASSESSMENT: PAIN_FUNCTIONAL_ASSESSMENT: 0-10

## 2024-06-11 NOTE — PROGRESS NOTES
ORTHOPAEDIC SURGERY PROGRESS NOTE    Chief Complaint:  Right ankle pain    History Of Present Illness  Maribell Rosales is a 19 y.o. female who presents for evaluation of right ankle pain.  Patient reports the atraumatic onset of right ankle pain while walking approximately 6 weeks ago.  Patient denies specific injury or change in her activity or shoewear.  Pain is made worse with walking and standing.  She has never had a pain like this in the past.  She has tried dry needling as well as over-the-counter bracing and limiting her activity with persistent pain.  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  This has been difficult for her as well is working out.  She denies any associated numbness, tingling or weakness.    04/25/2024: Patient returns for follow-up of her right ankle injury.  She has been in physical therapy and noting improvements with respect to her instability.  She reports that overall she is improving but continues with moderate pain in her ankle rated as 5 out of 10.  She has not had any recurrent brendon instability events.    06/11/2024: Patient returns for follow-up of her right ankle injury.  Patient continues with 8 out of 10 pain in her right ankle.  She is present with her mom today.  She reports several days of near complete pain relief following previous right ankle corticosteroid injection.  Unfortunately, she has continued with relatively severe and debilitating pain in her ankle.  She does notice a radiating pain that goes into her foot, this is reportedly about 30% of her overall pain.  Her more typical pain is on the outside of her ankle.  She has been unable to return to running due to pain.  She is obviously quite frustrated by this process.     Past Medical History  Past Medical History:   Diagnosis Date    Allergy to milk products 01/30/2019    History of allergy to milk products    Unspecified asthma, uncomplicated ([facility]) 01/15/2018    Childhood  asthma       Surgical History  Recent Surgeries in Orthopaedic Surgery            No cases to display             Social History  Social History     Socioeconomic History    Marital status: Single     Spouse name: Not on file    Number of children: Not on file    Years of education: Not on file    Highest education level: Not on file   Occupational History    Not on file   Tobacco Use    Smoking status: Never     Passive exposure: Never    Smokeless tobacco: Never   Substance and Sexual Activity    Alcohol use: Never    Drug use: Never    Sexual activity: Not on file   Other Topics Concern    Not on file   Social History Narrative    Not on file     Social Determinants of Health     Financial Resource Strain: Not on file   Food Insecurity: Not on file   Transportation Needs: Not on file   Physical Activity: Not on file   Stress: Not on file   Social Connections: Not on file   Intimate Partner Violence: Not on file   Housing Stability: Not on file       Family History  No family history on file.     Allergies  No Known Allergies    Review of Systems  REVIEW OF SYSTEMS  Constitutional: no unplanned weight loss  Psychiatric: no suicidal ideation  ENT: no vision changes, no sinus problems  Pulmonary: no shortness of breath  Lymphatic: no enlarged lymph nodes  Cardiovascular: no chest pain or shortness of breath  Gastrointestinal: no stomach problems  Genitourinary: no dysuria   Skin: no rashes  Endocrine: no thyroid problems  Neurological: no headache, no numbness  Hematological: no easy bruising  Musculoskeletal: Right ankle pain    Physical Exam  PHYSICAL EXAMINATION  Constitutional Exam: well developed and well nourished  Psychiatric Exam: alert and oriented, appropriate mood and behavior  Eye Exam: EOMI  Pulmonary Exam: breathing non-labored, no apparent distress  Lymphatic exam: no appreciable lymphadenopathy in the lower extremities  Cardiovascular exam: RRR to peripheral palpation, DP pulses 2+, PT 2+, toes are  pink with good capillary refill, no pitting edema  Skin exam: no open lesions, rashes, abrasions or ulcerations  Neurological exam: sensation to light touch intact in both lower extremities in peripheral and dermatomal distributions (except for any abnormalities noted in musculoskeletal exam)    Musculoskeletal exam: Right lower extremity examination.  Patient pain continues to localize to the anterolateral ankle.  She has focal tenderness to palpation overlying the ATFL and AITFL.  Nontender to palpation medially.  Patient has physiologic hindfoot valgus with a neutral arch posture and no significant forefoot deformity noted.  She has relatively pain-free and supple ankle, subtalar and midtarsal joint range of motion.  She has sensation intact light touch grossly in a saphenous, sural, superficial peroneal, deep peroneal and tibial nerve distribution.  She has 5 out of 5 strength in plantarflexion, dorsiflexion and EHL.  She is 2+ DP/PT pulse palpated.  She has a mildly positive anterior drawer by comparison to the contralateral side with negative talar tilt test.  She does have a negative syndesmotic squeeze test as well as negative dorsiflexion external rotation stress test.  Patient does have a positive ankle stabilization tape test as well as positive anterolateral impingement test.    Last Recorded Vitals  There were no vitals taken for this visit.    Laboratory Results    No results found for this or any previous visit (from the past 24 hour(s)).    Radiology Results   MRI right ankle reviewed from 06/10/2024 and independently evaluated by me on 06/11/2024 demonstrates at least attenuation of the AITFL and PITFL on axial sequences.    Assessment/Plan:  19-year-old female who in my impression has right ankle pain likely stemming from anterolateral ankle impingement with suspected syndesmotic injury in the setting of positive response to stabilization tape test.  I have reviewed the diagnosis and treatment  options extensively with the patient.  I recommend the patient continue weightbearing to her tolerance in her right lower extremity, she may continue with her lace up style ankle brace.  Patient has failed an appropriate and exhaustive course of nonoperative treatment including anti-inflammatories, physical therapy, bracing as well as a corticosteroid injection with symptomatic relief but return of symptoms.  I discussed frankly with the patient and her mother that in light of her negative MRI, I am concerned regarding the possibility of ankle impingement as well as subtle syndesmotic instability.  I recommended from a surgical perspective that we could perform a right ankle arthroscopy with stability procedures as indicated, syndesmosis anticipated.  Patient and her mother are in agreement and will recommend that they obtain a second opinion with Dr. Lujan prior to consideration for surgery.  The patient will keep me apprised of her progress and we will plan to schedule surgery at their convenience.    Antwon Washington MD, BINTA  Department of Orthopaedic Surgery  [facility]

## 2024-06-25 ENCOUNTER — OFFICE VISIT (OUTPATIENT)
Dept: ORTHOPEDIC SURGERY | Facility: CLINIC | Age: 19
End: 2024-06-25
Payer: COMMERCIAL

## 2024-06-25 DIAGNOSIS — M25.871 ANKLE IMPINGEMENT SYNDROME, RIGHT: Primary | ICD-10-CM

## 2024-06-25 DIAGNOSIS — M76.821 POSTERIOR TIBIAL TENDON DYSFUNCTION, RIGHT: ICD-10-CM

## 2024-06-25 DIAGNOSIS — M21.41 ACQUIRED PES PLANUS, RIGHT: ICD-10-CM

## 2024-06-25 PROCEDURE — 99214 OFFICE O/P EST MOD 30 MIN: CPT | Performed by: ORTHOPAEDIC SURGERY

## 2024-06-25 NOTE — PROGRESS NOTES
HISTORY OF PRESENT ILLNESS  This is a pleasant 19 y.o. year old female  who presents on 06/25/2024 at the request of Dr. Washington for evaluation of  right ankle  pain that has been present over/since  feb 2024 .    How the condition occurred or started: no specific injury, she worked out often and doing HIIT training  Location of pain (patient points to): anterolateral ankle  Quality of pain: Moderate to severe depending on activities  Modifying Factors: not able to run, hard to walk for longer period of time, worse with squats  Associated Signs and symptoms: no deep catching, popping or locking  Previous Treatment: physical therapy, dry needling, bracing, physician directed activity modification, right cortisone injection ankle 4/25/24 with pain relief but wore off, MRI recently  [employer] student, Masters [employer]    PHYSICAL EXAMINATION  Constitutional Exam: patient's height and weight reviewed, well-kempt  Psychiatric Exam: alert and oriented x 3, appropriate mood and behavior  Eye Exam: AGUILAR, EOMI  Pulmonary Exam: breathing non-labored, no apparent distress  Lymphatic exam: no appreciable lymphadenopathy in the lower extremities  Cardiovascular exam: DP pulses 2+ bilaterally, PT 2+ bilaterally, toes are pink with good capillary refill, no pitting edema  Skin exam: no open lesions, rashes, abrasions or ulcerations  Neurological exam: sensation to light touch intact in both lower extremities in peripheral and dermatomal distributions (except for any abnormalities noted in musculoskeletal exam)    Musculoskeletal exam: right ankle: very tender over anterolateral and anterior ankle joint line and worse with dorsiflexion ankle, stable negative anterior drawer and negative talar tilt test, negative DF-eversion stress test, no ankle effusion, increased hindfoot valgus early AAFD2A, achilles mildly tight    DATA/RESULTS REVIEW: I personally reviewed the patient's x-ray and MRI images and reports of the  right  ankle showing no ocd, tendons and ligaments intact.  Increased plical fold/fat pad over anterolateral and anterior ankle.  No obvious tibiotalar spurs.  No tumors .     ASSESSMENT: right ankle anterior ankle soft tissue impingement, anterolateral and anterior  PLAN: I discussed with the patient the differential diagnosis, complex repetitive microtrauma related and inflammatory nature of the condition(s) and available treatment option(s).  She unfortunately has not had any significant relief despite a full course of physical therapy, bracing, anti-inflammatory orally and physician directed activity modification.  She did get temporary relief completely if after cortisone injection into the ankle joint but the medication wore off.  Discussed with her that MRI does not always show the soft tissue impingement well but appears that there is some thickening in the anterior and anterior lateral aspects of the synovial plical fold and fat pad region.  Her symptoms and clinical presentation are consistent with anterior ankle impingement from soft tissue.  She has no family or personal history of any rheumatoid arthritis, lupus, gout and she only has the right ankle involved and so I do not think it is a generalized issue or inflammatory arthropathy.  At this point, due to her significant functional limitations and limited walking/exercise and running tolerance, we discussed with the patient option for right ankle arthroscopy with extensive debridement of the soft tissue impingement and direct visualization.  Arthroscopy is more accurate than MRI with respect to diagnosis and treatment of intra articular conditions.  Discussed with her that she would have to be in a splint for the first few days after surgery to maintain Achilles flexibility and then be transition into a tall cam walker boot for weightbearing purposes and physical therapy to start soon as she gets into the boot hopefully.  There is a race between scar tissue  formation after surgery versus getting ankle motion back.  She additionally has a second problem which is hyperpronation related to some early adult onset flatfoot deformity grade 2A.  We recommend Spenco inserts or orthotics to control hyperpronation which can affect the ankle at times.  Discussed with her surgery is typically done as outpatient surgery under general anesthetic with popliteal block to help with pain control afterwards in addition to multimodal pain control medications.  She would be nonweightbearing the first few days and then transition to weightbearing in boot.  Physical therapy is very important to regain as much motion and strength as possible.  Early recovery is typically 6 to 8 weeks.  Getting back to running jumping can take longer 3 to 4 months due to need to build muscle tone and endurance prior to returning to a higher impact activity such as running.  Risks of surgery were discussed including pain, bleeding, infection, wound healing problems, injury to nerves or vessels, DVT PE and other medical complications.  Sometimes soft tissue impingement can lead to chondromalacia and later arthrosis.  Discussed with her time off of work and school.  Discussed with her recommendations to get knee walker or iwalk or use crutches after surgery for the nonweightbearing time period.  We would also recommend use of plantar fascia night splint after she gets out of the plaster splint for 4 to 6 weeks to help improve and maintain Achilles and plantar fascial flexibility and also to avoid having to wear a tall cam walker boot while sleeping.  Dr. Washington and myself are available to do this procedure depending on what the patient would like to do and timing that she prefers for surgery.  The patient's questions were answered in detail.      I discussed with patient the procedure in detail, risks and benefits of procedure, post-op protocol, anesthetic used with possible regional block, timeline of recovery,  "need for protective weightbearing and/or bracing after procedure, pain management protocol, DVT prophylaxis protocol, home preparation suggestions, pre-op surgery preparation, and other pertinent information.    Note dictated with [employer] software, completed without full type editing to avoid delay.      Dear Referring Physician,  It was my pleasure to see your patient, in the office today.  Please refer to the detailed notes above for my findings and recommendations.  Thank you once again for your consultation request.  If you have any further questions or concerns, please feel free to contact me via email or at the phone number and address below.  Sincerely,    Shereen Lujan MD   of Orthopedic Surgery, [employer]  Dual Fellowship-trained in Orthopedic Sports Medicine (Knee and Shoulder), and Foot and Ankle Surgery  The  [facility] [place]   [employer] Subspecialty Certificate in Orthopedic Sports Medicine  Head Team Physician Case \"[place]\" and [facility] \"[place]\"  Team [facility] Physician [id number] Paralympic Games  Team [place] Figure Skating Medical Practitioner, including International Event Coverage  Director, Foot and Ankle Division, Department of Orthopedics    [address], [place] [id number]  [email or web address]  Office [phone]    "

## 2024-06-26 ENCOUNTER — PREP FOR PROCEDURE (OUTPATIENT)
Dept: ORTHOPEDIC SURGERY | Facility: HOSPITAL | Age: 19
End: 2024-06-26
Payer: COMMERCIAL

## 2024-06-26 DIAGNOSIS — M25.871 IMPINGEMENT OF RIGHT ANKLE JOINT: ICD-10-CM

## 2024-06-26 DIAGNOSIS — S93.431A ANKLE SYNDESMOSIS DISRUPTION, RIGHT, INITIAL ENCOUNTER: ICD-10-CM

## 2024-06-26 DIAGNOSIS — M25.871 ANKLE IMPINGEMENT SYNDROME, RIGHT: Primary | ICD-10-CM

## 2024-06-26 RX ORDER — CEFAZOLIN SODIUM 2 G/50ML
2 SOLUTION INTRAVENOUS ONCE
OUTPATIENT
Start: 2024-06-26 | End: 2024-06-26

## 2024-06-26 RX ORDER — SODIUM CHLORIDE, SODIUM LACTATE, POTASSIUM CHLORIDE, CALCIUM CHLORIDE 600; 310; 30; 20 MG/100ML; MG/100ML; MG/100ML; MG/100ML
100 INJECTION, SOLUTION INTRAVENOUS CONTINUOUS
OUTPATIENT
Start: 2024-06-26

## 2024-07-17 ENCOUNTER — LAB (OUTPATIENT)
Dept: LAB | Facility: LAB | Age: 19
End: 2024-07-17
Payer: COMMERCIAL

## 2024-07-17 ENCOUNTER — PRE-ADMISSION TESTING (OUTPATIENT)
Dept: PREADMISSION TESTING | Facility: HOSPITAL | Age: 19
End: 2024-07-17
Payer: COMMERCIAL

## 2024-07-17 VITALS
WEIGHT: 127.87 LBS | DIASTOLIC BLOOD PRESSURE: 58 MMHG | RESPIRATION RATE: 18 BRPM | HEIGHT: 65 IN | HEART RATE: 76 BPM | TEMPERATURE: 98.3 F | BODY MASS INDEX: 21.3 KG/M2 | SYSTOLIC BLOOD PRESSURE: 98 MMHG | OXYGEN SATURATION: 100 %

## 2024-07-17 DIAGNOSIS — Z01.818 PREOP TESTING: ICD-10-CM

## 2024-07-17 DIAGNOSIS — M25.871 ANKLE IMPINGEMENT SYNDROME, RIGHT: ICD-10-CM

## 2024-07-17 DIAGNOSIS — Z01.818 PREOP TESTING: Primary | ICD-10-CM

## 2024-07-17 LAB
BASOPHILS # BLD AUTO: 0.1 X10*3/UL (ref 0–0.1)
BASOPHILS NFR BLD AUTO: 1.6 %
EOSINOPHIL # BLD AUTO: 0.21 X10*3/UL (ref 0–0.7)
EOSINOPHIL NFR BLD AUTO: 3.3 %
ERYTHROCYTE [DISTWIDTH] IN BLOOD BY AUTOMATED COUNT: 12.9 % (ref 11.5–14.5)
HCT VFR BLD AUTO: 38.3 % (ref 36–46)
HGB BLD-MCNC: 12.5 G/DL (ref 12–16)
IMM GRANULOCYTES # BLD AUTO: 0.02 X10*3/UL (ref 0–0.7)
IMM GRANULOCYTES NFR BLD AUTO: 0.3 % (ref 0–0.9)
LYMPHOCYTES # BLD AUTO: 1.78 X10*3/UL (ref 1.2–4.8)
LYMPHOCYTES NFR BLD AUTO: 28.1 %
MCH RBC QN AUTO: 31.2 PG (ref 26–34)
MCHC RBC AUTO-ENTMCNC: 32.6 G/DL (ref 32–36)
MCV RBC AUTO: 96 FL (ref 80–100)
MONOCYTES # BLD AUTO: 0.54 X10*3/UL (ref 0.1–1)
MONOCYTES NFR BLD AUTO: 8.5 %
NEUTROPHILS # BLD AUTO: 3.69 X10*3/UL (ref 1.2–7.7)
NEUTROPHILS NFR BLD AUTO: 58.2 %
NRBC BLD-RTO: 0 /100 WBCS (ref 0–0)
PLATELET # BLD AUTO: 201 X10*3/UL (ref 150–450)
RBC # BLD AUTO: 4.01 X10*6/UL (ref 4–5.2)
WBC # BLD AUTO: 6.3 X10*3/UL (ref 4.4–11.3)

## 2024-07-17 PROCEDURE — 99204 OFFICE O/P NEW MOD 45 MIN: CPT | Performed by: PHYSICIAN ASSISTANT

## 2024-07-17 PROCEDURE — 85025 COMPLETE CBC W/AUTO DIFF WBC: CPT

## 2024-07-17 PROCEDURE — 36415 COLL VENOUS BLD VENIPUNCTURE: CPT

## 2024-07-17 PROCEDURE — 87081 CULTURE SCREEN ONLY: CPT | Mod: AHULAB | Performed by: PHYSICIAN ASSISTANT

## 2024-07-17 RX ORDER — CHLORHEXIDINE GLUCONATE ORAL RINSE 1.2 MG/ML
SOLUTION DENTAL
Qty: 473 ML | Refills: 0 | Status: SHIPPED | OUTPATIENT
Start: 2024-07-17

## 2024-07-17 ASSESSMENT — ENCOUNTER SYMPTOMS
CARDIOVASCULAR NEGATIVE: 1
ALLERGIC/IMMUNOLOGIC NEGATIVE: 1
MUSCULOSKELETAL NEGATIVE: 1
HEMATOLOGIC/LYMPHATIC NEGATIVE: 1
PSYCHIATRIC NEGATIVE: 1
GASTROINTESTINAL NEGATIVE: 1
NEUROLOGICAL NEGATIVE: 1
ENDOCRINE NEGATIVE: 1
RESPIRATORY NEGATIVE: 1
CONSTITUTIONAL NEGATIVE: 1
EYES NEGATIVE: 1

## 2024-07-17 NOTE — PREPROCEDURE INSTRUCTIONS
Medication List            Accurate as of July 17, 2024  9:10 AM. Always use your most recent med list.                cholecalciferol 50 mcg (2,000 unit) capsule  Commonly known as: Vitamin D-3  Medication Adjustments for Surgery: Continue until night before surgery     cyanocobalamin (vitamin B-12) 5,000 mcg tablet, sublingual  Medication Adjustments for Surgery: Continue until night before surgery     ergocalciferol 1.25 MG (08592 UT) capsule  Commonly known as: Vitamin D-2  Notes to patient: Patient states not taking this medication      escitalopram 20 mg tablet  Commonly known as: Lexapro  Medication Adjustments for Surgery: Take morning of surgery with sip of water, no other fluids     ferrous sulfate 325 (65 Fe) MG EC tablet  Medication Adjustments for Surgery: Continue until night before surgery     omega-3 acid ethyl esters 1 gram capsule  Commonly known as: Lovaza  Medication Adjustments for Surgery: Stop 7 days before surgery     Prenatal 19 29 mg iron- 1 mg tablet  Generic drug: -iron fum-folic acid  Medication Adjustments for Surgery: Stop 7 days before surgery                 Concerning above medication instructions - If medication is normally taken at night continue normal schedule - do not take night prior and morning of surgery.     CONTACT SURGEON'S OFFICE IF YOU DEVELOP:  * Fever = 100.4 F   * New respiratory symptoms (e.g. cough, shortness of breath, respiratory distress, sore throat)  * Recent loss of taste or smell  *Flu like symptoms such as headache, fatigue or gastrointestinal symptoms  * You develop any open sores, shingles, burning or painful urination   AND/OR:  * You no longer wish to have the surgery.  * Any other personal circumstances change that may lead to the need to cancel or defer this surgery.  *You were admitted to any hospital within one week of your planned procedure.    SMOKING:  *Quitting smoking can make a huge difference to your health and recovery from  surgery.    *If you need help with quitting, call 4-697-QUIT-NOW.    THE DAY BEFORE SURGERY:  *Do not eat any food after midnight the night before surgery/procedure.   *You are permitted to drink clear liquids (i.e. water, black coffee/tea, (no milk or cream) apple juice, and electrolyte drinks (Gatorade)13.5 ounces up to 2 hours before your instructed arrival time to the hospital.  *You may chew gum until  2 hours before your surgery/procedure.    SURGICAL TIME  *You will be contacted between 2 p.m. and 6 p.m. the business day before your surgery with your arrival time.  *If you haven't received a call by 6pm, call [phone].  *Scheduled surgery times may change and you will be notified if this occurs-check your personal voicemail for any updates.    ON THE MORNING OF SURGERY:  *Wear comfortable, loose fitting clothing.   *Do not use moisturizers, creams, lotions or perfume.  *All jewelry and valuables should be left at home.  *Prosthetic devices such as contact lenses, hearing aids, dentures, eyelash extensions, hairpins and body piercing must be removed before surgery.    BRING WITH YOU:  *Photo ID and insurance card  *Current list of medicines and allergies  *Pacemaker/Defibrillator/Heart stent cards  *CPAP machine and mask  *Slings/splints/crutches  *Copy of your complete Advanced Directive/DHPOA-if applicable  *Neurostimulator implant remote    PARKING AND ARRIVAL:  *Check in at the Main Entrance desk and let them know you are here for surgery.  *You will be directed to the 2nd floor surgical waiting area.    AFTER OUTPATIENT SURGERY:  *A responsible adult MUST accompany you at the time of discharge and stay with you for 24 hours after your surgery.  *You may NOT drive yourself home after surgery.  *You may use a taxi or ride sharing service ([employer], [employer]) to return home ONLY if you are accompanied by a friend or family member.  *Instructions for resuming your medications will be provided by your  surgeon.      Home Preoperative Antibacterial Shower     What is a home preoperative antibacterial shower?  This shower is a way of cleaning the skin with a germ killing soap before surgery.  The soap contains chlorhexidine, commonly known as CHG.  CHG is a soap for your skin with germ killing ability.  Let your doctor know if you are allergic to chlorhexidine.    Why do I need to take a preoperative antibacterial shower?  Skin is not sterile.  It is best to try to make your skin as free of germs as possible before surgery.  Proper cleansing with a germ killing soap before surgery can lower the number of germs on your skin.  This helps to reduce the risk of infection at the surgical site.  Following the instructions listed below will help you prepare your skin for surgery.      How do I use the CHG skin cleanser?  Steps:  Begin using your CHG soap five days before your scheduled surgery on ________________________.    Days 1-4 Shower before bed:  Wash your face and genitals with your normal soap and rinse.    Wash and rinse your hair using the CHG soap. Rinse completely, do not condition your   Hair.          3.    Apply the CHG soap to a clean wet washcloth.  Turn the water off or move away                From the water spray to avoid premature rinsing of the CHG soap as you are applying.     4.   Lather your entire body from the neck down.  Do not use on your face or genitals.   Pay special attention to the area(s) where your incision(s) will be located unless they are on your face.  Avoid scrubbing your skin too hard.  The important point is to have the CHG soap sit on your skin for 3 minutes.    When the 3 minutes are up, turn on the water and rinse the CHG soap off your body completely.   Pat yourself dry with a clean, freshly-laundered towel.  Dress in clean, freshly laundered night clothes.    Be sure to sleep with clean, freshly laundered sheets.  Day 5:  Last shower is the morning before surgery: Follow  above Instructions.    NOTE:    *Hair extensions should be removed    *Keep CHG soap out of eyes and ear canals   *DO NOT wash with regular soap on your body after you have used the CHG        soap solution  *DO NOT apply powders, lotions, or perfume.  *Deodorant may be used days 1-4, BUT NOT the day of surgery    Who should I contact if I have any questions regarding the use of CHG soap?  Call the [facility], Preadmission Testing at [phone] if you have any questions.              Patient Information: Pre-Operative Infection Prevention Measures     Why did I have my nose, under my arms and groin swabbed?  The purpose of the swab is to identify Staphylococcus aureus inside your nose or on your skin.  The swab was sent to the laboratory for culture.  A positive swab/culture for Staphylococcus aureus is called colonization or carriage.      What is Staphylococcus aureus?  Staphylococcus aureus, also known as “staph”, is a germ found on the skin or in the nose of healthy people.  Sometimes Staphylococcus aureus can get into the body and cause an infection.  This can be minor (such as pimples, boils or other skin problems).  It might also be serious (such as blood infection, pneumonia or a surgical site infection).    What is Staphylococcus aureus colonization or carriage?  Colonization or carriage means that a person has the germ but is not sick from it.  These bacteria can be spread on the hands or when breathing or sneezing.    How is Staphylococcus aureus spread?  It is most often spread by close contact with a person or item that carries it.    What happens if my culture is positive for Staphylococcus aureus?  Your doctor/medical team will use this information to guide any antibiotic treatment which may be necessary.  Regardless of the culture results, we will clean the inside of your nose with a betadine swab just before you have your surgery.      Will I get an infection if I  have Staphylococcus aureus in my nose or on my skin?  Anyone can get an infection with Staphylococcus aureus.  However, the best way to reduce your risk of infection is to follow the instructions provided to you for the use of your CHG soap and dental rinse.        Who should I contact if I have any questions?  Call the [facility], Preadmission Testing at [phone] if you have any questions.           Patient Information: Oral/Dental Rinse  **This is a prescription; pick it up at your preferred local pharmacy **  What is oral/dental rinse?   It is a mouthwash. It is a way of cleaning the mouth with a germ killing solution before your surgery.  The solution contains chlorhexidine, commonly known as CHG.   It is used inside the mouth to kill a bacteria known as Staphylococcus aureus.  Let your doctor know if you are allergic to Chlorhexidine.    Why do I need to use CHG oral/dental rinse?  The CHG oral/dental rinse helps to kill a bacteria in your mouth known a Staphylococcus aureus.     This reduces the risk of infection at the surgical site.      Using your CHG oral/dental rinse  STEPS:  Use your CHG oral/dental rinse after you brush your teeth the night before (at bedtime) and the morning of your surgery.  Follow all directions on your prescription label.    Use the cap on the container to measure 15ml (fill cap to fill line)  Swish (gargle if you can) the mouthwash in your mouth for at least 30 seconds, (do not to swallow) spit out  After you use your CHG rinse, do not rinse your mouth with water, drink or eat.  Please refer to prescription label for the appropriate time to resume oral intake  Dental rinse comes in one size bottle: 473ml ~16oz.  You will have leftover    rinse, discard after this use.    What side effects might I have using the CHG oral/dental rinse?  CHG rinse will stick to plaque on the teeth.  Brush and floss just before use.  Teeth brushing will help avoid  staining of plaque during use.    Who should I contact if I have questions about the CHG oral/dental rinse?  Please call [facility], Preadmission Testing at [phone] if you have any questions

## 2024-07-17 NOTE — H&P (VIEW-ONLY)
[facility] Evaluation       Name: Maribell Rosales (Maribell Rosales)  /Age: 2005/19 y.o.     In-Person       Chief Complaint: Impingement of right ankle joint     HPI    Date of Consult: 24    Referring Provider: Dr. Washington    Surgery, Date, and Length: Right Ankle Fracture Open Reduction Internal Fixation; Debridement - Right ; 24; 180 minutes     Maribell Rosales is a 19 year-old female who presents to the [facility] for perioperative risk assessment prior to surgery.  Patient reports the atraumatic onset of right ankle pain while walking . Patient denies specific injury or change in her activity or shoewear. Pain is made worse with walking and standing. She has tried dry needling as well as over-the-counter bracing and limiting her activity with persistent pain. Patient continues with 8 out of 10 pain in her right ankle. She has continued with relatively severe and debilitating pain in her ankle. She does notice a radiating pain that goes into her foot, this is reportedly about 30% of her overall pain. Her more typical pain is on the outside of her ankle. She has been unable to return to running due to pain. 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 This has been difficult for her as well is working out. She denies any associated numbness, tingling or weakness.       This note was created in part upon personal review of patient's medical records.      Patient is scheduled to have Right Ankle Fracture Open Reduction Internal Fixation; Debridement - Right     Medical History  Past Medical History:   Diagnosis Date    Allergy to milk products     Anemia     GERD (gastroesophageal reflux disease)     Unspecified asthma, uncomplicated ([facility])     Childhood asthma - stable        STOP BANG = 0    Caprini =  2  (surgery)    Surgical History  Past Surgical History:   Procedure Laterality Date    UPPER GASTROINTESTINAL ENDOSCOPY      WISDOM TOOTH EXTRACTION               Family  history:  Family History   Problem Relation Name Age of Onset    No Known Problems Mother      No Known Problems Father      No Known Problems Brother          Social history:  Social History     Socioeconomic History    Marital status: Single     Spouse name: Not on file    Number of children: Not on file    Years of education: Not on file    Highest education level: Not on file   Occupational History    Not on file   Tobacco Use    Smoking status: Never     Passive exposure: Never    Smokeless tobacco: Never   Vaping Use    Vaping status: Never Used   Substance and Sexual Activity    Alcohol use: Never    Drug use: Never    Sexual activity: Not on file   Other Topics Concern    Not on file   Social History Narrative    Not on file     Social Determinants of Health     Financial Resource Strain: Not on file   Food Insecurity: Not on file   Transportation Needs: Not on file   Physical Activity: Not on file   Stress: Not on file   Social Connections: Not on file   Intimate Partner Violence: Not on file   Housing Stability: Not on file          Current Outpatient Medications:     cholecalciferol (Vitamin D-3) 50 mcg (2,000 unit) capsule, Take 5,000 Units by mouth once daily., Disp: , Rfl:     cyanocobalamin, vitamin B-12, 5,000 mcg tablet, sublingual, Place 1 tablet under the tongue once daily., Disp: , Rfl:     escitalopram (Lexapro) 20 mg tablet, Take 25 mg by mouth once daily., Disp: , Rfl:     ferrous sulfate 325 (65 Fe) MG EC tablet, Take 65 mg by mouth 3 times a day with meals. Do not crush, chew, or split., Disp: , Rfl:     omega-3 acid ethyl esters (Lovaza) 1 gram capsule, Take 1 capsule (1 g) by mouth 2 times a day., Disp: , Rfl:     -iron fum-folic acid (Prenatal 19) 29 mg iron- 1 mg tablet, Take by mouth., Disp: , Rfl:     chlorhexidine (Peridex) 0.12 % solution, 15 ml swish and spit for 30 seconds night prior to surgery and morning of surgery, Disp: 473 mL, Rfl: 0    ergocalciferol (Vitamin D-2)  "1.25 MG (04605 UT) capsule, Take 4 capsules (5,000 mcg) by mouth 1 (one) time per week., Disp: , Rfl:          Visit Vitals  BP 98/58   Pulse 76   Temp 36.8 °C (98.3 °F)   Resp 18   Ht 1.638 m (5' 4.5\")   Wt 58 kg (127 lb 13.9 oz)   SpO2 100%   BMI 21.61 kg/m²   Smoking Status Never   BSA 1.62 m²           Review of Systems   Constitutional: Negative.    HENT: Negative.     Eyes: Negative.    Respiratory: Negative.     Cardiovascular: Negative.         METS 4 limited only by the ankle   Gastrointestinal: Negative.    Endocrine: Negative.    Genitourinary: Negative.    Musculoskeletal: Negative.         Right ankle / foot pain   Skin: Negative.    Allergic/Immunologic: Negative.    Neurological: Negative.    Hematological: Negative.    Psychiatric/Behavioral: Negative.          Physical Exam  Vitals reviewed.   Constitutional:       Appearance: Normal appearance.   HENT:      Head: Normocephalic and atraumatic.      Right Ear: External ear normal.      Left Ear: External ear normal.      Nose: Nose normal.      Mouth/Throat:      Pharynx: Oropharynx is clear.   Eyes:      Extraocular Movements: Extraocular movements intact.      Conjunctiva/sclera: Conjunctivae normal.      Pupils: Pupils are equal, round, and reactive to light.   Cardiovascular:      Rate and Rhythm: Normal rate and regular rhythm.      Heart sounds: Normal heart sounds.   Pulmonary:      Effort: Pulmonary effort is normal.      Breath sounds: Normal breath sounds.   Abdominal:      Palpations: Abdomen is soft.   Musculoskeletal:         General: Normal range of motion.      Cervical back: Normal range of motion and neck supple.   Skin:     General: Skin is warm and dry.   Neurological:      General: No focal deficit present.      Mental Status: She is alert and oriented to person, place, and time.   Psychiatric:         Mood and Affect: Mood normal.         Behavior: Behavior normal.          PAT AIRWAY:   Airway:     Neck ROM::  Full    Lab " Results   Component Value Date    WBC 6.3 07/17/2024    HGB 12.5 07/17/2024    HCT 38.3 07/17/2024    MCV 96 07/17/2024     07/17/2024      RCRI  0  , 3.9 % Risk of MACE    Hematology       Patient instructed to ambulate as soon as possible postoperatively to decrease thromboembolic risk.      Initiate mechanical DVT prophylaxis as soon as possible and initiate chemical prophylaxis when deemed safe from a bleeding standpoint post surgery.       VTE prophylaxis per surgical team       Tests ordered in PAT: cbc, mrsa  LABS REVIEWED: WNL     Risk assessment complete.  Patient is scheduled for a low/intermediate surgical risk procedure.        Preoperative medication instructions were provided and reviewed with the patient.  Any additional testing or evaluation was explained to the patient.  Nothing by mouth instructions were discussed and patient's questions were answered prior to conclusion to this encounter.  Patient verbalized understanding of preoperative instructions given in preadmission testing; discharge instructions available in EMR.    This note was dictated by a speech recognition.  Minor errors may have been detected in a speech recognition.

## 2024-07-17 NOTE — CPM/PAT H&P
[facility]       Name: Maribell Rosales (Maribell Rosales)  /Age: 2005/19 y.o.     In-Person       Chief Complaint: Impingement of right ankle joint     HPI    Date of Consult: 24    Referring Provider: Dr. Washington    Surgery, Date, and Length: Right Ankle Fracture Open Reduction Internal Fixation; Debridement - Right ; 24; 180 minutes     Maribell Rosales is a 19 year-old female who presents to the [facility] for perioperative risk assessment prior to surgery.  Patient reports the atraumatic onset of right ankle pain while walking . Patient denies specific injury or change in her activity or shoewear. Pain is made worse with walking and standing. She has tried dry needling as well as over-the-counter bracing and limiting her activity with persistent pain. Patient continues with 8 out of 10 pain in her right ankle. She has continued with relatively severe and debilitating pain in her ankle. She does notice a radiating pain that goes into her foot, this is reportedly about 30% of her overall pain. Her more typical pain is on the outside of her ankle. She has been unable to return to running due to pain. 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 She denies any associated numbness, tingling or weakness.       This note was created in part upon personal review of patient's medical records.      Patient is scheduled to have Right Ankle Fracture Open Reduction Internal Fixation; Debridement - Right     Medical History  Past Medical History:   Diagnosis Date    Allergy to milk products     Anemia     GERD (gastroesophageal reflux disease)     Unspecified asthma, uncomplicated ([facility])     Childhood asthma - stable        STOP BANG = 0    Caprini =  2  (surgery)    Surgical History  Past Surgical History:   Procedure Laterality Date    UPPER GASTROINTESTINAL ENDOSCOPY      WISDOM TOOTH EXTRACTION               Family  history:  Family History   Problem Relation Name Age of Onset    No Known Problems Mother      No Known Problems Father      No Known Problems Brother          Social history:  Social History     Socioeconomic History    Marital status: Single     Spouse name: Not on file    Number of children: Not on file    Years of education: Not on file    Highest education level: Not on file   Occupational History    Not on file   Tobacco Use    Smoking status: Never     Passive exposure: Never    Smokeless tobacco: Never   Vaping Use    Vaping status: Never Used   Substance and Sexual Activity    Alcohol use: Never    Drug use: Never    Sexual activity: Not on file   Other Topics Concern    Not on file   Social History Narrative    Not on file     Social Determinants of Health     Financial Resource Strain: Not on file   Food Insecurity: Not on file   Transportation Needs: Not on file   Physical Activity: Not on file   Stress: Not on file   Social Connections: Not on file   Intimate Partner Violence: Not on file   Housing Stability: Not on file          Current Outpatient Medications:     cholecalciferol (Vitamin D-3) 50 mcg (2,000 unit) capsule, Take 5,000 Units by mouth once daily., Disp: , Rfl:     cyanocobalamin, vitamin B-12, 5,000 mcg tablet, sublingual, Place 1 tablet under the tongue once daily., Disp: , Rfl:     escitalopram (Lexapro) 20 mg tablet, Take 25 mg by mouth once daily., Disp: , Rfl:     ferrous sulfate 325 (65 Fe) MG EC tablet, Take 65 mg by mouth 3 times a day with meals. Do not crush, chew, or split., Disp: , Rfl:     omega-3 acid ethyl esters (Lovaza) 1 gram capsule, Take 1 capsule (1 g) by mouth 2 times a day., Disp: , Rfl:     -iron fum-folic acid (Prenatal 19) 29 mg iron- 1 mg tablet, Take by mouth., Disp: , Rfl:     chlorhexidine (Peridex) 0.12 % solution, 15 ml swish and spit for 30 seconds night prior to surgery and morning of surgery, Disp: 473 mL, Rfl: 0    ergocalciferol (Vitamin D-2)  "1.25 MG (25566 UT) capsule, Take 4 capsules (5,000 mcg) by mouth 1 (one) time per week., Disp: , Rfl:          Visit Vitals  BP 98/58   Pulse 76   Temp 36.8 °C (98.3 °F)   Resp 18   Ht 1.638 m (5' 4.5\")   Wt 58 kg (127 lb 13.9 oz)   SpO2 100%   BMI 21.61 kg/m²   Smoking Status Never   BSA 1.62 m²           Review of Systems   Constitutional: Negative.    HENT: Negative.     Eyes: Negative.    Respiratory: Negative.     Cardiovascular: Negative.         METS 4 limited only by the ankle   Gastrointestinal: Negative.    Endocrine: Negative.    Genitourinary: Negative.    Musculoskeletal: Negative.         Right ankle / foot pain   Skin: Negative.    Allergic/Immunologic: Negative.    Neurological: Negative.    Hematological: Negative.    Psychiatric/Behavioral: Negative.          Physical Exam  Vitals reviewed.   Constitutional:       Appearance: Normal appearance.   HENT:      Head: Normocephalic and atraumatic.      Right Ear: External ear normal.      Left Ear: External ear normal.      Nose: Nose normal.      Mouth/Throat:      Pharynx: Oropharynx is clear.   Eyes:      Extraocular Movements: Extraocular movements intact.      Conjunctiva/sclera: Conjunctivae normal.      Pupils: Pupils are equal, round, and reactive to light.   Cardiovascular:      Rate and Rhythm: Normal rate and regular rhythm.      Heart sounds: Normal heart sounds.   Pulmonary:      Effort: Pulmonary effort is normal.      Breath sounds: Normal breath sounds.   Abdominal:      Palpations: Abdomen is soft.   Musculoskeletal:         General: Normal range of motion.      Cervical back: Normal range of motion and neck supple.   Skin:     General: Skin is warm and dry.   Neurological:      General: No focal deficit present.      Mental Status: She is alert and oriented to person, place, and time.   Psychiatric:         Mood and Affect: Mood normal.         Behavior: Behavior normal.          PAT AIRWAY:   Airway:     Neck ROM::  Full    Lab " Results   Component Value Date    WBC 6.3 07/17/2024    HGB 12.5 07/17/2024    HCT 38.3 07/17/2024    MCV 96 07/17/2024     07/17/2024      RCRI  0  , 3.9 % Risk of MACE    Hematology       Patient instructed to ambulate as soon as possible postoperatively to decrease thromboembolic risk.      Initiate mechanical DVT prophylaxis as soon as possible and initiate chemical prophylaxis when deemed safe from a bleeding standpoint post surgery.       VTE prophylaxis per surgical team       Tests ordered in PAT: cbc, mrsa  LABS REVIEWED: WNL     Risk assessment complete.  Patient is scheduled for a low/intermediate surgical risk procedure.        Preoperative medication instructions were provided and reviewed with the patient.  Any additional testing or evaluation was explained to the patient.  Nothing by mouth instructions were discussed and patient's questions were answered prior to conclusion to this encounter.  Patient verbalized understanding of preoperative instructions given in preadmission testing; discharge instructions available in EMR.    This note was dictated by a speech recognition.  Minor errors may have been detected in a speech recognition.

## 2024-07-19 LAB — STAPHYLOCOCCUS SPEC CULT: NORMAL

## 2024-08-01 ENCOUNTER — HOSPITAL ENCOUNTER (OUTPATIENT)
Facility: HOSPITAL | Age: 19
Setting detail: OUTPATIENT SURGERY
Discharge: HOME | End: 2024-08-01
Attending: STUDENT IN AN ORGANIZED HEALTH CARE EDUCATION/TRAINING PROGRAM | Admitting: STUDENT IN AN ORGANIZED HEALTH CARE EDUCATION/TRAINING PROGRAM
Payer: COMMERCIAL

## 2024-08-01 ENCOUNTER — APPOINTMENT (OUTPATIENT)
Dept: RADIOLOGY | Facility: HOSPITAL | Age: 19
End: 2024-08-01
Payer: COMMERCIAL

## 2024-08-01 ENCOUNTER — ANESTHESIA (OUTPATIENT)
Dept: OPERATING ROOM | Facility: HOSPITAL | Age: 19
End: 2024-08-01
Payer: COMMERCIAL

## 2024-08-01 ENCOUNTER — ANESTHESIA EVENT (OUTPATIENT)
Dept: OPERATING ROOM | Facility: HOSPITAL | Age: 19
End: 2024-08-01
Payer: COMMERCIAL

## 2024-08-01 VITALS
HEART RATE: 93 BPM | DIASTOLIC BLOOD PRESSURE: 54 MMHG | WEIGHT: 127.87 LBS | OXYGEN SATURATION: 98 % | HEIGHT: 64 IN | SYSTOLIC BLOOD PRESSURE: 99 MMHG | RESPIRATION RATE: 16 BRPM | BODY MASS INDEX: 21.83 KG/M2 | TEMPERATURE: 97.2 F

## 2024-08-01 DIAGNOSIS — M25.871 IMPINGEMENT OF RIGHT ANKLE JOINT: ICD-10-CM

## 2024-08-01 DIAGNOSIS — M25.871 ANKLE IMPINGEMENT SYNDROME, RIGHT: Primary | ICD-10-CM

## 2024-08-01 PROCEDURE — 3700000002 HC GENERAL ANESTHESIA TIME - EACH INCREMENTAL 1 MINUTE: Performed by: STUDENT IN AN ORGANIZED HEALTH CARE EDUCATION/TRAINING PROGRAM

## 2024-08-01 PROCEDURE — A27829 PR OPEN TX DISTAL TIBIOFIBULAR JOINT DISRUPTION: Performed by: ANESTHESIOLOGY

## 2024-08-01 PROCEDURE — 97116 GAIT TRAINING THERAPY: CPT | Mod: GP

## 2024-08-01 PROCEDURE — 2500000004 HC RX 250 GENERAL PHARMACY W/ HCPCS (ALT 636 FOR OP/ED): Mod: JZ | Performed by: STUDENT IN AN ORGANIZED HEALTH CARE EDUCATION/TRAINING PROGRAM

## 2024-08-01 PROCEDURE — 2720000007 HC OR 272 NO HCPCS: Performed by: STUDENT IN AN ORGANIZED HEALTH CARE EDUCATION/TRAINING PROGRAM

## 2024-08-01 PROCEDURE — 2780000003 HC OR 278 NO HCPCS: Performed by: STUDENT IN AN ORGANIZED HEALTH CARE EDUCATION/TRAINING PROGRAM

## 2024-08-01 PROCEDURE — 2500000004 HC RX 250 GENERAL PHARMACY W/ HCPCS (ALT 636 FOR OP/ED)

## 2024-08-01 PROCEDURE — A27829 PR OPEN TX DISTAL TIBIOFIBULAR JOINT DISRUPTION

## 2024-08-01 PROCEDURE — 2500000004 HC RX 250 GENERAL PHARMACY W/ HCPCS (ALT 636 FOR OP/ED): Performed by: ANESTHESIOLOGY

## 2024-08-01 PROCEDURE — C1713 ANCHOR/SCREW BN/BN,TIS/BN: HCPCS | Performed by: STUDENT IN AN ORGANIZED HEALTH CARE EDUCATION/TRAINING PROGRAM

## 2024-08-01 PROCEDURE — A4649 SURGICAL SUPPLIES: HCPCS | Performed by: STUDENT IN AN ORGANIZED HEALTH CARE EDUCATION/TRAINING PROGRAM

## 2024-08-01 PROCEDURE — 3600000004 HC OR TIME - INITIAL BASE CHARGE - PROCEDURE LEVEL FOUR: Performed by: STUDENT IN AN ORGANIZED HEALTH CARE EDUCATION/TRAINING PROGRAM

## 2024-08-01 PROCEDURE — 7100000001 HC RECOVERY ROOM TIME - INITIAL BASE CHARGE: Performed by: STUDENT IN AN ORGANIZED HEALTH CARE EDUCATION/TRAINING PROGRAM

## 2024-08-01 PROCEDURE — 29898 ANKLE ARTHROSCOPY/SURGERY: CPT | Performed by: STUDENT IN AN ORGANIZED HEALTH CARE EDUCATION/TRAINING PROGRAM

## 2024-08-01 PROCEDURE — 2500000005 HC RX 250 GENERAL PHARMACY W/O HCPCS

## 2024-08-01 PROCEDURE — 7100000009 HC PHASE TWO TIME - INITIAL BASE CHARGE: Performed by: STUDENT IN AN ORGANIZED HEALTH CARE EDUCATION/TRAINING PROGRAM

## 2024-08-01 PROCEDURE — 64447 NJX AA&/STRD FEMORAL NRV IMG: CPT | Performed by: ANESTHESIOLOGY

## 2024-08-01 PROCEDURE — 2500000005 HC RX 250 GENERAL PHARMACY W/O HCPCS: Performed by: STUDENT IN AN ORGANIZED HEALTH CARE EDUCATION/TRAINING PROGRAM

## 2024-08-01 PROCEDURE — 7100000010 HC PHASE TWO TIME - EACH INCREMENTAL 1 MINUTE: Performed by: STUDENT IN AN ORGANIZED HEALTH CARE EDUCATION/TRAINING PROGRAM

## 2024-08-01 PROCEDURE — 3700000001 HC GENERAL ANESTHESIA TIME - INITIAL BASE CHARGE: Performed by: STUDENT IN AN ORGANIZED HEALTH CARE EDUCATION/TRAINING PROGRAM

## 2024-08-01 PROCEDURE — 3600000009 HC OR TIME - EACH INCREMENTAL 1 MINUTE - PROCEDURE LEVEL FOUR: Performed by: STUDENT IN AN ORGANIZED HEALTH CARE EDUCATION/TRAINING PROGRAM

## 2024-08-01 PROCEDURE — 97161 PT EVAL LOW COMPLEX 20 MIN: CPT | Mod: GP

## 2024-08-01 PROCEDURE — 7100000002 HC RECOVERY ROOM TIME - EACH INCREMENTAL 1 MINUTE: Performed by: STUDENT IN AN ORGANIZED HEALTH CARE EDUCATION/TRAINING PROGRAM

## 2024-08-01 PROCEDURE — C1769 GUIDE WIRE: HCPCS | Performed by: STUDENT IN AN ORGANIZED HEALTH CARE EDUCATION/TRAINING PROGRAM

## 2024-08-01 PROCEDURE — 64445 NJX AA&/STRD SCIATIC NRV IMG: CPT | Performed by: ANESTHESIOLOGY

## 2024-08-01 PROCEDURE — 2500000004 HC RX 250 GENERAL PHARMACY W/ HCPCS (ALT 636 FOR OP/ED): Performed by: STUDENT IN AN ORGANIZED HEALTH CARE EDUCATION/TRAINING PROGRAM

## 2024-08-01 PROCEDURE — 76000 FLUOROSCOPY <1 HR PHYS/QHP: CPT | Mod: RT

## 2024-08-01 PROCEDURE — 27829 TREAT LOWER LEG JOINT: CPT | Performed by: STUDENT IN AN ORGANIZED HEALTH CARE EDUCATION/TRAINING PROGRAM

## 2024-08-01 DEVICE — SCREW, LOW PROFILE, CORTICAL, 3.5 X 14MM, SS: Type: IMPLANTABLE DEVICE | Site: ANKLE | Status: FUNCTIONAL

## 2024-08-01 DEVICE — K-LESS T-ROPE W/DRV, SYN REPR, SS
Type: IMPLANTABLE DEVICE | Site: ANKLE | Status: FUNCTIONAL
Brand: ARTHREX®

## 2024-08-01 DEVICE — IMPLANTABLE DEVICE: Type: IMPLANTABLE DEVICE | Site: ANKLE | Status: FUNCTIONAL

## 2024-08-01 RX ORDER — ROPIVACAINE HYDROCHLORIDE 5 MG/ML
INJECTION, SOLUTION EPIDURAL; INFILTRATION; PERINEURAL
Status: DISCONTINUED
Start: 2024-08-01 | End: 2024-08-01 | Stop reason: HOSPADM

## 2024-08-01 RX ORDER — SODIUM CHLORIDE, SODIUM LACTATE, POTASSIUM CHLORIDE, CALCIUM CHLORIDE 600; 310; 30; 20 MG/100ML; MG/100ML; MG/100ML; MG/100ML
100 INJECTION, SOLUTION INTRAVENOUS CONTINUOUS
Status: DISCONTINUED | OUTPATIENT
Start: 2024-08-01 | End: 2024-08-01 | Stop reason: HOSPADM

## 2024-08-01 RX ORDER — FENTANYL CITRATE 50 UG/ML
INJECTION, SOLUTION INTRAMUSCULAR; INTRAVENOUS
Status: COMPLETED
Start: 2024-08-01 | End: 2024-08-01

## 2024-08-01 RX ORDER — PROMETHAZINE HYDROCHLORIDE 25 MG/ML
6.25 INJECTION, SOLUTION INTRAMUSCULAR; INTRAVENOUS ONCE AS NEEDED
Status: DISCONTINUED | OUTPATIENT
Start: 2024-08-01 | End: 2024-08-01 | Stop reason: HOSPADM

## 2024-08-01 RX ORDER — LIDOCAINE HYDROCHLORIDE 10 MG/ML
0.1 INJECTION, SOLUTION EPIDURAL; INFILTRATION; INTRACAUDAL; PERINEURAL ONCE
Status: DISCONTINUED | OUTPATIENT
Start: 2024-08-01 | End: 2024-08-01 | Stop reason: HOSPADM

## 2024-08-01 RX ORDER — SODIUM CHLORIDE 0.9 G/100ML
IRRIGANT IRRIGATION AS NEEDED
Status: DISCONTINUED | OUTPATIENT
Start: 2024-08-01 | End: 2024-08-01 | Stop reason: HOSPADM

## 2024-08-01 RX ORDER — OMEPRAZOLE 20 MG/1
20 CAPSULE, DELAYED RELEASE ORAL
Qty: 42 CAPSULE | Refills: 0 | Status: SHIPPED | OUTPATIENT
Start: 2024-08-01 | End: 2024-09-12

## 2024-08-01 RX ORDER — PHENYLEPHRINE HCL IN 0.9% NACL 1 MG/10 ML
SYRINGE (ML) INTRAVENOUS AS NEEDED
Status: DISCONTINUED | OUTPATIENT
Start: 2024-08-01 | End: 2024-08-01

## 2024-08-01 RX ORDER — OXYCODONE HYDROCHLORIDE 5 MG/1
5 TABLET ORAL EVERY 4 HOURS PRN
Qty: 20 TABLET | Refills: 0 | Status: SHIPPED | OUTPATIENT
Start: 2024-08-01 | End: 2024-08-06

## 2024-08-01 RX ORDER — CEFAZOLIN SODIUM 2 G/100ML
2 INJECTION, SOLUTION INTRAVENOUS ONCE
Status: COMPLETED | OUTPATIENT
Start: 2024-08-01 | End: 2024-08-01

## 2024-08-01 RX ORDER — ONDANSETRON HYDROCHLORIDE 2 MG/ML
4 INJECTION, SOLUTION INTRAVENOUS ONCE AS NEEDED
Status: DISCONTINUED | OUTPATIENT
Start: 2024-08-01 | End: 2024-08-01 | Stop reason: HOSPADM

## 2024-08-01 RX ORDER — SODIUM CHLORIDE, SODIUM LACTATE, POTASSIUM CHLORIDE, AND CALCIUM CHLORIDE .6; .31; .03; .02 G/100ML; G/100ML; G/100ML; G/100ML
IRRIGANT IRRIGATION AS NEEDED
Status: DISCONTINUED | OUTPATIENT
Start: 2024-08-01 | End: 2024-08-01 | Stop reason: HOSPADM

## 2024-08-01 RX ORDER — BUPIVACAINE HYDROCHLORIDE 5 MG/ML
INJECTION, SOLUTION EPIDURAL; INTRACAUDAL AS NEEDED
Status: DISCONTINUED | OUTPATIENT
Start: 2024-08-01 | End: 2024-08-01

## 2024-08-01 RX ORDER — MIDAZOLAM HYDROCHLORIDE 1 MG/ML
INJECTION, SOLUTION INTRAMUSCULAR; INTRAVENOUS
Status: COMPLETED
Start: 2024-08-01 | End: 2024-08-01

## 2024-08-01 RX ORDER — ONDANSETRON HYDROCHLORIDE 2 MG/ML
INJECTION, SOLUTION INTRAVENOUS AS NEEDED
Status: DISCONTINUED | OUTPATIENT
Start: 2024-08-01 | End: 2024-08-01

## 2024-08-01 RX ORDER — ASPIRIN 81 MG/1
81 TABLET ORAL DAILY
Qty: 84 TABLET | Refills: 0 | Status: SHIPPED | OUTPATIENT
Start: 2024-08-01 | End: 2024-10-24

## 2024-08-01 RX ORDER — BUPIVACAINE HCL/EPINEPHRINE 0.5-1:200K
VIAL (ML) INJECTION
Status: DISCONTINUED
Start: 2024-08-01 | End: 2024-08-01 | Stop reason: HOSPADM

## 2024-08-01 RX ORDER — ALBUTEROL SULFATE 0.83 MG/ML
2.5 SOLUTION RESPIRATORY (INHALATION) ONCE AS NEEDED
Status: DISCONTINUED | OUTPATIENT
Start: 2024-08-01 | End: 2024-08-01 | Stop reason: HOSPADM

## 2024-08-01 RX ORDER — MIDAZOLAM HYDROCHLORIDE 1 MG/ML
INJECTION, SOLUTION INTRAMUSCULAR; INTRAVENOUS AS NEEDED
Status: DISCONTINUED | OUTPATIENT
Start: 2024-08-01 | End: 2024-08-01

## 2024-08-01 RX ORDER — LABETALOL HYDROCHLORIDE 5 MG/ML
5 INJECTION, SOLUTION INTRAVENOUS ONCE AS NEEDED
Status: DISCONTINUED | OUTPATIENT
Start: 2024-08-01 | End: 2024-08-01 | Stop reason: HOSPADM

## 2024-08-01 RX ORDER — TRANEXAMIC ACID 100 MG/ML
INJECTION, SOLUTION INTRAVENOUS AS NEEDED
Status: DISCONTINUED | OUTPATIENT
Start: 2024-08-01 | End: 2024-08-01

## 2024-08-01 RX ORDER — LIDOCAINE HYDROCHLORIDE 20 MG/ML
INJECTION, SOLUTION EPIDURAL; INFILTRATION; INTRACAUDAL; PERINEURAL AS NEEDED
Status: DISCONTINUED | OUTPATIENT
Start: 2024-08-01 | End: 2024-08-01

## 2024-08-01 RX ORDER — OXYCODONE HYDROCHLORIDE 5 MG/1
5 TABLET ORAL EVERY 4 HOURS PRN
Status: DISCONTINUED | OUTPATIENT
Start: 2024-08-01 | End: 2024-08-01 | Stop reason: HOSPADM

## 2024-08-01 RX ORDER — HYDRALAZINE HYDROCHLORIDE 20 MG/ML
5 INJECTION INTRAMUSCULAR; INTRAVENOUS EVERY 30 MIN PRN
Status: DISCONTINUED | OUTPATIENT
Start: 2024-08-01 | End: 2024-08-01 | Stop reason: HOSPADM

## 2024-08-01 RX ORDER — FENTANYL CITRATE 50 UG/ML
INJECTION, SOLUTION INTRAMUSCULAR; INTRAVENOUS AS NEEDED
Status: DISCONTINUED | OUTPATIENT
Start: 2024-08-01 | End: 2024-08-01

## 2024-08-01 RX ORDER — ROPIVACAINE HYDROCHLORIDE 2 MG/ML
INJECTION, SOLUTION EPIDURAL; INFILTRATION; PERINEURAL AS NEEDED
Status: DISCONTINUED | OUTPATIENT
Start: 2024-08-01 | End: 2024-08-01

## 2024-08-01 RX ORDER — PROPOFOL 10 MG/ML
INJECTION, EMULSION INTRAVENOUS AS NEEDED
Status: DISCONTINUED | OUTPATIENT
Start: 2024-08-01 | End: 2024-08-01

## 2024-08-01 ASSESSMENT — PAIN SCALES - GENERAL
PAINLEVEL_OUTOF10: 0 - NO PAIN
PAINLEVEL_OUTOF10: 4
PAINLEVEL_OUTOF10: 0 - NO PAIN

## 2024-08-01 ASSESSMENT — PAIN - FUNCTIONAL ASSESSMENT
PAIN_FUNCTIONAL_ASSESSMENT: UNABLE TO SELF-REPORT
PAIN_FUNCTIONAL_ASSESSMENT: UNABLE TO SELF-REPORT
PAIN_FUNCTIONAL_ASSESSMENT: 0-10

## 2024-08-01 ASSESSMENT — COLUMBIA-SUICIDE SEVERITY RATING SCALE - C-SSRS
1. IN THE PAST MONTH, HAVE YOU WISHED YOU WERE DEAD OR WISHED YOU COULD GO TO SLEEP AND NOT WAKE UP?: NO
6. HAVE YOU EVER DONE ANYTHING, STARTED TO DO ANYTHING, OR PREPARED TO DO ANYTHING TO END YOUR LIFE?: NO
2. HAVE YOU ACTUALLY HAD ANY THOUGHTS OF KILLING YOURSELF?: NO

## 2024-08-01 NOTE — ANESTHESIA PROCEDURE NOTES
Peripheral Block    Patient location during procedure: pre-op  Start time: 8/1/2024 11:05 AM  End time: 8/1/2024 11:14 AM  Reason for block: at surgeon's request and post-op pain management  Staffing  Performed: attending   Authorized by: Mich Win MD    Performed by: Mich Win MD  Preanesthetic Checklist  Completed: patient identified, IV checked, site marked, risks and benefits discussed, surgical consent, monitors and equipment checked, pre-op evaluation and timeout performed   Timeout performed at: 8/1/2024 11:05 AM  Peripheral Block  Patient position: laying flat  Prep: ChloraPrep  Patient monitoring: heart rate and continuous pulse ox  Block type: popliteal and adductor canal  Laterality: right  Injection technique: single-shot  Guidance: ultrasound guided  Local infiltration: lidocaine  Infiltration strength: 1 %  Dose: 2 mL  Needle  Needle gauge: 21 G  Needle length: 8 cm  Needle localization: ultrasound guidance     image stored in chart  Assessment  Injection assessment: negative aspiration for heme, no paresthesia on injection, incremental injection and local visualized surrounding nerve on ultrasound           SCr 1.17 -> 1.11 -> 1.59. On previous admission, SCr as high as 3.71, decreased to 1.74 by d/c.   - unclear baseline, although appears to be developing some component of NORAMN  - trend SCr  - avoid nephrotoxic agents  - q12h BMP - s/p lokelma this AM for K 5.5

## 2024-08-01 NOTE — ANESTHESIA POSTPROCEDURE EVALUATION
/Patient: Maribell Rosales    Procedure Summary       Date: 08/01/24 Room / Location: [facility] A OR 04 / Virtual [facility] A OR    Anesthesia Start: 1140 Anesthesia Stop: 1438    Procedure: Right Ankle Arthroscopy, Ankle Debridement, Syndesmotic Stabilization (Right: Ankle) Diagnosis:       Impingement of right ankle joint      (Impingement of right ankle joint [M25.871])    Surgeons: Antwon Washington MD Responsible Provider: Mich Win MD    Anesthesia Type: general ASA Status: 1            Anesthesia Type: general    Vitals Value Taken Time   BP 98/53 08/01/24 1438   Temp 36.4 08/01/24 1438   Pulse 85 08/01/24 1438   Resp 16 08/01/24 1438   SpO2 100 % 08/01/24 1438   Vitals shown include unfiled device data.    Anesthesia Post Evaluation    Patient location during evaluation: bedside  Patient participation: complete - patient participated  Level of consciousness: awake  Pain management: adequate  Airway patency: patent  Cardiovascular status: acceptable  Respiratory status: acceptable and face mask  Hydration status: acceptable  Postoperative Nausea and Vomiting: none        No notable events documented.

## 2024-08-01 NOTE — OP NOTE
Right Ankle Arthroscopy, Ankle Debridement, Syndesmotic Stabilization (R) Operative Note     Date: 2024  OR Location: [facility] A OR    Name: Maribell Rosales, : 2005, Age: 19 y.o., MRN: [id number], Sex: female    Diagnosis  Pre-op Diagnosis      * Impingement of right ankle joint [M25.871] Post-op Diagnosis     * Impingement of right ankle joint [M25.871]     Procedures  Right Ankle Arthroscopy, Ankle Debridement, Syndesmotic Stabilization  01785 - CT OPEN TX DISTAL TIBIOFIBULAR JOINT DISRUPTION    Right Ankle Arthroscopy, Ankle Debridement, Syndesmotic Stabilization  30370 - CT ARTHROSCOPY ANKLE SURGICAL DEBRIDEMENT EXTENSIVE      Surgeons      * Antwon Washington - Primary    Resident/Fellow/Other Assistant:  Surgeons and Role:  * No surgeons found with a matching role *    Procedure Summary  Anesthesia: General  ASA: I  Anesthesia Staff: Anesthesiologist: Mich Win MD  C-AA: AUGUST Sandoval  Estimated Blood Loss: <5 mL  Intra-op Medications:   Administrations occurring from 1030 to 1330 on 24:   Medication Name Total Dose   sodium chloride 0.9 % irrigation solution 1,000 mL   lactated Ringer's irrigation solution 3,000 mL   ceFAZolin (Ancef) 2 g in dextrose (iso)  mL 2 g   fentaNYL PF (Sublimaze) injection  - Omnicell Override Pull Cannot be calculated   midazolam (Versed) injection  - Omnicell Override Pull Cannot be calculated              Anesthesia Record               Intraprocedure I/O Totals          Intake    LR bolus 700.00 mL    Tranexamic Acid 0.00 mL    The total shown is the total volume documented since Anesthesia Start was filed.    Total Intake 700 mL          Specimen: No specimens collected     Staff:   Circulator: Eric  Scrub Person: Chantelle  Circulator: Judith  Scrub Person: Laine Garay Circulator: Shannon  Scrub Person: Annamaria         Drains and/or Catheters: * None in log *    Tourniquet Times:     Total Tourniquet Time Documented:  Thigh (Right) -  114 minutes  Total: Thigh (Right) - 114 minutes      Implants:  Implants       Type Name Action Serial No.      Implant KIT, REPAIR, TIGHTROPE XP, SYNEDESMOSIS - SN/A - [id number] Implanted N/A     Implant KIT, REPAIR, TIGHTROPE XP, SYNEDESMOSIS - SN/A - [id number] Implanted N/A     Screw PLATE, LOCKING, 4H, STRAIGHT, SS - [id number] Implanted      Screw SCREW, LOW PROFILE, CORTICAL, 3.5 X 14MM, SS - [id number] Implanted      Implant 3.5 KREULOCK Implanted             Patient Name: Maribell Rosales  MRN: [id number]  YOB: 2005  Date of Service: 08/01/24  Report Type: Operative    PREOPERATIVE DIAGNOSIS:    Right Anterior Ankle Impingement    POSTOPERATIVE DIAGNOSIS:    1) Right Anterior Ankle Impingement  2) Right Syndesmosis Disruption    OPERATION/PROCEDURE:    1) Right Ankle Arthroscopy with Extensive Debridement  2) Right Syndesmosis Stabilization    SURGEON:  Antwon Washington MD    ASSISTANT(S):  SA    ANESTHESIA:  General    ESTIMATED BLOOD LOSS: <5 cc    COMPLICATIONS: None apparent    DISPOSITION: PACU/Stable    BRIEF CLINICAL HISTORY: 19-year-old female well-known to me at this time who initially presented for evaluation of right ankle pain in March 2024.  On my evaluation the patient she had significant pain the anterolateral aspect of her ankle.  She was tender to palpation overlying the ATFL/AITFL.  Although her pain was not associated with any trauma she had great difficulty with a single-leg hop at the time of initial presentation.  Patient trialed physical therapy, activity modification, anti-inflammatories as well as a corticosteroid injection with near complete pain relief but then recurrence of her symptoms.  I performed a stabilization tape test of her ankle and the patient was able to perform a single-leg hop with noted improvement in her pain. Having failed an appropriate and exhaustive course of nonoperative treatment I obtained an MRI of her right ankle that was suggestive on my  interpretation of attenuation of both the AITFL and PITFL on axial sequences, her ankle x-rays were otherwise within normal limits.  I had a long discussion with the patient and her mother regarding treatment options.  I recommended proceeding to the operating room for right ankle arthroscopy with possibility of syndesmotic stabilization.  I reviewed the risk, benefits and alternatives to surgery, risk include but not limited to, in brief, infection, wound healing complications, need for further surgery, persistent or worsening pain, postoperative stiffness, bleeding, blood loss requiring a blood transfusion, neurovascular injury, mal- or non-union, recurrent deformity, hardware failure, hardware pain, failure of the procedure, complications of anesthesia, stroke, DVT/PE, myocardial infarction and death. Benefits included decreased pain, improve function and possibility of improved stability of the ankle mortise as indicated. Alternatives included continued conservative management. The patient voiced understanding of the risks, benefits and alternatives and the informed consent was signed, with both myself and the patient present.    OPERATIVE REPORT: On the day of surgery 08/01/2024, the patient was met in the preoperative holding area by members of the orthopedic, anesthesia and nursing teams.  I marked the patient's right lower extremity extremity with indelible ink with the patient as my witness.  The informed consent was again reviewed.  All remaining questions were answered.  In the preoperative holding area, the anesthesia team performed a regional block. The patient had been previously medically optimized for surgery by LYDIA.    The patient was then brought back to the operating room on [facility].  I led a preoperative timeout, verifying the correct patient, procedure and laterality of procedure to be performed.  We confirmed the appropriate availability of all surgical equipment,  implants, utilization  of fluoroscopy and confirmed the administration of pre-surgical IV antibiotic prophylaxis within 1 hour of incision time, 2 g Ancef and weight-based TXA.  All present were in agreement.    Care was handed over to the anesthesia team who provided GETA.  The patient was then transferred onto the operating room table in the supine position.  All bony prominences were padded and an SCD was placed on the contra-lateral extremity. A nonsterile, well-padded thigh tourniquet was placed and the patient's extremity was placed into the leg pastor.  The patient's right lower extremity was then prepped and draped in usual sterile fashion with sterile prep with ChloraPrep.    I marked out the relevant skin anatomy including ankle mortise, TA tendon and the projected course of the SPN.  I Identified the anteromedial arthroscopy portal just medial to the palpable TA tendon at the level of the tibiotalar joint.  I then led a preprocedure pause, again verifying the correct patient, procedure and laterality of procedure to be performed.  All present were in agreement.  The right lower extremity was then exsanguinated with an Esmarch and the tourniquet was inflated to 275 mmHg.  I then insufflated the tibiotalar joint with intra-articular saline injection with appropriate distention of the anterolateral joint capsule.  I incised the skin only just medial to the TA tendon and utilizing a nick and spread technique came down to the level of the joint capsule and then through the joint capsule with great care to avoid the saphenous nerve and vein as well as TA tendon.  The arthroscopic cannula and trochar was delivered into the joint through the anteromedial portal and this was exchanged for the arthroscope.    The anterolateral portal was then established under direct arthroscopic visualization.  Incision was made through skin only and then hemostat was used to bluntly discussed to and then through the joint capsule utilizing a nick and  spread technique with great care to avoid the superficial peroneal nerve branches.    I then turned my attention to performing a diagnostic evaluation of the tibiotalar joint and extensive arthroscopic debridement.  There were dense adhesions as well as hypertrophic synovium noted medially and laterally at the level of the tibiotalar joint, this was debrided with the use of an arthroscopic shaver. There was no obvious [place]'s ligament identified, the AITFL appeared attenuated. There cartilage was well appearing. Debridement was continued along the anterior talar neck.  The posterior ankle was without significant pathology. There was fibrous tissue noted within the syndesmosis which debrided with the arthroscopic shaver.  There is no noted talar osteochondral lesion.  I then performed a intraoperative stability examination, there was a negative drive-through sign of both the lateral and medial gutter.  There was a positive drive-through sign of the syndesmosis consistent with syndesmotic injury. This concluded the arthroscopic portion of the procedure.  The joint was flushed and the remaining fluid was evacuated.  Arthroscopic instruments were then removed and the arthroscopic portals were closed with 3-0 nylon in a simple interrupted fashion.    I then proceeded with the open syndesmotic stabilization based on arthroscopic stability testing.  The arthroscopic equipment was removed from the field and new sterile drapes were fit.  I marked out a standard lateral approach to the distal fibula. Laterally, incision was made through skin and subcutaneous tissue with careful dissection with dissecting scissors to avoid the SPN or sural nerves, which did not cross into the surgical field. Under biplanar fluoroscopic imaging I selected an appropriately sized Arthrex 4-hole plate.  The plate was provisionally fixated to bone with a threaded olive wire.  Under biplanar fluoroscopic guidance I then drilled, measured and  secured a 14 mm bicortical fully threaded screw in the proximal aspect of the plate.  Then proceeding to the most distal plate hole, I drilled, measured and secured a 14 mm unicortical locking.    I then turned my attention to performing a syndesmotic reduction, under biplanar fluoroscopic imaging with thumb pressure a 1.6 mm K wire was drilled from the lateral cortex of the fibula to the medial cortex of the tibia parallel to the tibiotalar joint.  Syndesmotic reduction was confirmed by biplanar fluoroscopic imaging.  I then drilled for 2 divergent Arthrex tight ropes in the central screw holes of the plate.  I incised on the medial side of the ankle in line with the drill path.  Dissection was deepened carefully with tenotomy scissors and the saphenous vein and nerve were identified, mobilized and protected throughout the duration of the procedure.  From a lateral to medial of the Arthrex tight rope was deployed under biplanar fluoroscopic imaging with the medial oblong buttons secured directly to bone and out of the course of the saphenous vein and nerve.  With the ankle in neutral dorsiflexion I then sequentially compressed the syndesmosis in an alternating fashion.  This completed the syndesmotic stabilization portion of the procedure.    Final fluoroscopic films including mortise ankle, dorsiflexion external rotation stress as well as lateral ankle views were obtained demonstrating appropriate syndesmotic reduction as well as appropriate trajectory of the diversion flexible fixation with cortical buttons..    The wounds were copiously irrigated and closed in layers.  3-0 Vicryl was used for the deep layer with 3-0 Vicryl for the deep dermal layer and 3-0 nylon for skin.  The skin was cleansed and dried and dry sterile dressings were placed.  The sterile drapes were removed. The patient was then placed into a well-padded short leg splint.  By this time, the tourniquet had been released and there was excellent  reperfusion of the extremity with palpable 2+ DP pulses.    All surgical counts were noted to be correct. The patient was then awoken from anesthesia without complication and transferred from the operating room table onto the hospitals and then brought back to the PACU in stable condition.    Post-Operative Plan:   The patient will remain nonweightbearing in their right lower extremity in a short leg plaster splint.  They will begin aspirin for DVT prophylaxis.  I have encouraged early mobilization and extremity elevation as tolerated.  I will plan to see the patient back in approximately 2 weeks for their first postoperative visit.    Complications:  None; patient tolerated the procedure well.    Disposition: PACU - hemodynamically stable.  Condition: stable     Attending Attestation: I was present and scrubbed for the entire procedure.    Antwon Washington MD, BINTA  Department of Orthopaedic Surgery  [facility]      Note dictated with [employer] software.  Completed without full type editing and sent to avoid delay.

## 2024-08-01 NOTE — ANESTHESIA PREPROCEDURE EVALUATION
Patient: Maribell Rosales    Procedure Information       Date/Time: 08/01/24 1030    Procedure: Right Ankle Arthroscopy, Possible Syndesmotic Stabilization (Right: Ankle)    Location: [facility] A OR 04 / Virtual [facility] A OR    Surgeons: Antwon Washington MD          18 yo F hx asthma (as child only; not now)    Relevant Problems   Neuro   (+) Anxiety      GI   (+) GE reflux      Hematology   (+) Iron deficiency anemia       Clinical information reviewed:   Tobacco  Allergies  Meds   Med Hx  Surg Hx  OB Status  Fam Hx  Soc   Hx        NPO Detail:  NPO/Void Status  Carbohydrate Drink Given Prior to Surgery? : N  Date of Last Liquid: 07/31/24  Time of Last Liquid: 2300  Date of Last Solid: 07/31/24  Time of Last Solid: 2100  Last Intake Type: Clear fluids  Time of Last Void: 0929         Physical Exam    Airway  Mallampati: II  TM distance: >3 FB  Neck ROM: full     Cardiovascular   Rate: normal     Dental - normal exam     Pulmonary - normal exam     Abdominal            Anesthesia Plan    History of general anesthesia?: yes  History of complications of general anesthesia?: no    ASA 1     general     intravenous induction   Postoperative administration of opioids is intended.  Anesthetic plan and risks discussed with patient.

## 2024-08-01 NOTE — PROGRESS NOTES
Physical Therapy                 Therapy Communication Note    Patient Name: Maribell Rosales  MRN: [id number]  Today's Date: 8/1/2024     Discipline: Physical Therapy    PT eval and gait training completed this date. Paper documentation put in pt's chart in PACU.

## 2024-08-01 NOTE — DISCHARGE INSTRUCTIONS
"POST-OPERATIVE DISCHARGE INSTRUCTIONS:    ACTIVITY:    Non-Weightbearing RLE in a Short Leg Plaster Splint     You are advised to go home directly from the hospital or surgical center. Restrict your activities.    Return to school/work will be determined at next clinic visit, unless previously discussed with the surgical team     BLOOD CLOT PREVENTION:    To prevent blood clot formation, you have been prescribed ASA to be taken as prescribed until your surgeon or his Physician's Assistant (PA) states you can stop taking it.    Moving around and walking (with crutches) helps decrease the risk of blood clots. You must get up and \"move around\" once every hour, unless your are sleeping.    While you are sleeping and when you are not being active, continue to keep your leg elevated as much as possible.    It is common to have swelling in your feet after surgery for even a year afterwards, so the more you keep the leg elevated after surgery, the less swelling you will have later on.     GENERAL INSTRUCTIONS:  1.  Keep your surgical site elevated above your heart for at least 7 days or longer to prevent swelling (a good way to remember this is \"toes above nose\"). This will improve your comfort and your overall recovery following surgery.  Avoid pressure under the heel and keep the heel clear to avoid ulceration     2. Be alert for signs of infection including redness, streaking, odor, fever or chills. Be alert for excessive pain or bleeding and notify your surgeon immediately. Also, notify your surgeon of nausea, vomiting, or chills, numbness or weakness, bleeding, redness, swelling, pain, or drainage from surgical incision(s), bowel or bladder dysfunction, severe pain not relieved by pain medications, temperature greater than 101.0 F (38.3 C) degrees.    3. If you smoke (or have smoked within the last year), we strongly recommend that you do not smoke. This may lead to increased risk of wound infection and will decrease " your chances of healing (bone, soft tissue, etc).     WOUND INSTRUCTIONS:    Leave your Short Leg Plaster Splint until your post operative visit.  Keep it clean and dry.     Always keep the incision clean and dry until the staples/sutures are removed. If there is no drainage from the incision you should keep it open to air. If there is drainage from the incision you must keep it covered at all times until the drainage stops.    You may shower carefully (avoid falling) but the incision must be covered with Tegaderm or a water proof dressing so the wound does not get wet; once the staples/sutures are removed, the Tegaderm is no longer necessary and the wound may be washed with soap and water.     If you do not have sutures that need to be removed  then the incision should remain clean and dry for a minimum of 14 days.    Do not soak in a bath tub, hot tub, pool, lake or other body of water until atleast 21 days after your surgery and your incision is completely dry and healed. Or until approved by Dr. Washington.    If you have removable sutures (or staples) they will be removed in approximately 14-21 days (unless otherwise instructed) from the day of your surgery.     If you have a fiberglass cast or splint in place, please consider the following instructions:  1)  Elevate the extremity as much as possible.  2)  Keep the cast or splint clean and dry.  3)  Please call us if the cast becomes wet or dirty.  4)  If you are experiencing worsening pain or worsening swelling, please call.  5)  Itching can be bothersome.  You can try:  - Scratching the opposite limb in the same spot.  - Running air through the cast or splint with a blow dryer on cold.  - Do NOT stick anything inside the cast as it may become lodged    DIET:    Return to your regular diet as tolerated. Begin with light or bland foods. Drink plenty of fluids.      MEDICATIONS:    Resume all previous home medications at the previous prescribed dose and frequency  "unless otherwise noted    PAIN CONTROL:     For pain control, you have been prescribed medications.    Narcotic medication in the form of oxycodone. Take as prescribed and wean as able. Do not take with medications that are sedating, especially benzodiazepines.  You may take Ibuprofen (800mg every 6hrs) and Tylenol (500mg every 6hrs) for the first three days only.   - Alternate/stagger these two medications so that you're not taking them both at the same time. Take as prescribed for the first three days then stop when prescriptions done. This will provide a strong anti-inflammatory effect for the first few days.  3. The anesthesia block will wear off eventually. Some patients it will last only a few hours after surgery and in others it may last a few days. As soon as you start feeling any sensation at all in your leg, start taking the medications, so that you're not caught playing \"catch up\".   4. Remember to keep elevated. Avoid using ice while your extremity nerve block is still working. If you can't feel your leg and it is still numb, then the ice may induce injury. Ice behind the knee can help as well. You may apply ice to your cast/splint/dressing for the first few days. 20 minutes of icing followed by 2 hours of no ice. Keep the splint/cast/dressing dry and place a towel between the ice and the dressing. DO NOT GET YOUR DRESSING/SPLINT/CAST WET!  5. While taking narcotics, you may have constipation.  A stool softener is recommended. You may use an over-the-counter stool softener or use the one that has been prescribed for you. Colace and Senokot are common over-the-counter medications.  6. Do not drink alcohol or drive while using narcotic pain medication.  7. Do NOT take additional tylenol if your pain medication already has tylenol in it     IMPORTANT INFORMATION:    Please call your surgeon' s clinic with questions Monday-Friday during business hours. If no one answers, please leave a message and someone " should get back to the patient within 24 hours.  For after-hours calls, please call the  and request to contact the answering service for Dr. Washington.   For emergencies, call 911 or present to the nearest ER for immediate evaluation.     FOLLOWUP INSTRUCTIONS:    Please contact [phone] to confirm scheduled follow-up appointment or to make changes to your scheduled appointment.

## 2024-08-01 NOTE — ANESTHESIA PROCEDURE NOTES
Airway  Date/Time: 8/1/2024 11:49 AM  Urgency: elective    Airway not difficult    Staffing  Performed: AUGUST   Authorized by: Mich Win MD    Performed by: AUGUST Sandoval  Patient location during procedure: OR    Indications and Patient Condition  Indications for airway management: anesthesia and airway protection  Spontaneous ventilation: present  Sedation level: deep  Preoxygenated: yes  Patient position: sniffing  Mask difficulty assessment: 1 - vent by mask    Final Airway Details  Final airway type: supraglottic airway      Successful airway: Supraglottic airway: igel.  Size 4     Number of attempts at approach: 1

## 2024-08-14 ENCOUNTER — OFFICE VISIT (OUTPATIENT)
Dept: ORTHOPEDIC SURGERY | Facility: CLINIC | Age: 19
End: 2024-08-14
Payer: COMMERCIAL

## 2024-08-14 DIAGNOSIS — S93.431A ANKLE SYNDESMOSIS DISRUPTION, RIGHT, INITIAL ENCOUNTER: ICD-10-CM

## 2024-08-14 DIAGNOSIS — M25.871 ANKLE IMPINGEMENT SYNDROME, RIGHT: Primary | ICD-10-CM

## 2024-08-14 PROCEDURE — L4361 PNEUMA/VAC WALK BOOT PRE OTS: HCPCS | Performed by: STUDENT IN AN ORGANIZED HEALTH CARE EDUCATION/TRAINING PROGRAM

## 2024-08-14 PROCEDURE — 99211 OFF/OP EST MAY X REQ PHY/QHP: CPT | Performed by: STUDENT IN AN ORGANIZED HEALTH CARE EDUCATION/TRAINING PROGRAM

## 2024-08-14 ASSESSMENT — PAIN SCALES - GENERAL: PAINLEVEL_OUTOF10: 2

## 2024-08-14 ASSESSMENT — PAIN DESCRIPTION - DESCRIPTORS: DESCRIPTORS: ACHING;DULL

## 2024-08-14 ASSESSMENT — PAIN - FUNCTIONAL ASSESSMENT: PAIN_FUNCTIONAL_ASSESSMENT: 0-10

## 2024-08-15 NOTE — PROGRESS NOTES
ORTHOPAEDIC SURGERY PROGRESS NOTE    Chief Complaint:  Right ankle pain    History Of Present Illness  Maribell Rosales is a 19 y.o. female who presents for evaluation of right ankle pain.  Patient reports the atraumatic onset of right ankle pain while walking approximately 6 weeks ago.  Patient denies specific injury or change in her activity or shoewear.  Pain is made worse with walking and standing.  She has never had a pain like this in the past.  She has tried dry needling as well as over-the-counter bracing and limiting her activity with persistent pain.  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  This has been difficult for her as well is working out.  She denies any associated numbness, tingling or weakness.    04/25/2024: Patient returns for follow-up of her right ankle injury.  She has been in physical therapy and noting improvements with respect to her instability.  She reports that overall she is improving but continues with moderate pain in her ankle rated as 5 out of 10.  She has not had any recurrent brendon instability events.    06/11/2024: Patient returns for follow-up of her right ankle injury.  Patient continues with 8 out of 10 pain in her right ankle.  She is present with her mom today.  She reports several days of near complete pain relief following previous right ankle corticosteroid injection.  Unfortunately, she has continued with relatively severe and debilitating pain in her ankle.  She does notice a radiating pain that goes into her foot, this is reportedly about 30% of her overall pain.  Her more typical pain is on the outside of her ankle.  She has been unable to return to running due to pain.  She is obviously quite frustrated by this process.    08/14/2024: Patient returns s/p right ankle arthroscopy with extensive debridement and syndesmotic stabilization from 08/01/2024.  Patient is currently reporting 2 out of 10 pain that is gradually improving.  She  reports that the majority of her preoperative pain has actually resolved.  Patient has been compliant with nonweightbearing restrictions in a short leg fiberglass splint.  She is present with her mother today.     Past Medical History  Past Medical History:   Diagnosis Date    Allergy to milk products     Anemia     GERD (gastroesophageal reflux disease)     Unspecified asthma, uncomplicated ([facility])     Childhood asthma - stable       Surgical History  Recent Surgeries in Orthopaedic Surgery            No cases to display             Social History  Social History     Socioeconomic History    Marital status: Single   Tobacco Use    Smoking status: Never     Passive exposure: Never    Smokeless tobacco: Never   Vaping Use    Vaping status: Never Used   Substance and Sexual Activity    Alcohol use: Never    Drug use: Never    Sexual activity: Defer       Family History  Family History   Problem Relation Name Age of Onset    No Known Problems Mother      No Known Problems Father      No Known Problems Brother          Allergies  Allergies   Allergen Reactions    Milk Containing Products (Dairy) Diarrhea       Review of Systems  REVIEW OF SYSTEMS  Constitutional: no unplanned weight loss  Psychiatric: no suicidal ideation  ENT: no vision changes, no sinus problems  Pulmonary: no shortness of breath  Lymphatic: no enlarged lymph nodes  Cardiovascular: no chest pain or shortness of breath  Gastrointestinal: no stomach problems  Genitourinary: no dysuria   Skin: no rashes  Endocrine: no thyroid problems  Neurological: no headache, no numbness  Hematological: no easy bruising  Musculoskeletal: Right ankle pain    Physical Exam  PHYSICAL EXAMINATION  Constitutional Exam: well developed and well nourished  Psychiatric Exam: alert and oriented, appropriate mood and behavior  Eye Exam: EOMI  Pulmonary Exam: breathing non-labored, no apparent distress  Lymphatic exam: no appreciable lymphadenopathy in the lower  extremities  Cardiovascular exam: RRR to peripheral palpation, DP pulses 2+, PT 2+, toes are pink with good capillary refill, no pitting edema  Skin exam: no open lesions, rashes, abrasions or ulcerations  Neurological exam: sensation to light touch intact in both lower extremities in peripheral and dermatomal distributions (except for any abnormalities noted in musculoskeletal exam)    Musculoskeletal exam: Right lower extremity examination.  Patient has well-appearing anterior ankle arthroscopy portals medially and laterally as well as medial and lateral ankle incisions.  There is no sariah-incisional erythema, induration, fluctuance or drainage to suggest underlying infection.  Patient has no tenderness to palpation overlying the AITFL.  I am able to passively range her to neutral dorsiflexion without pain. She has sensation intact light touch grossly in a saphenous, sural, superficial peroneal, deep peroneal and tibial nerve distribution.  She has 5 out of 5 strength in plantarflexion, dorsiflexion and EHL.  She is 2+ DP/PT pulse palpated.    Last Recorded Vitals  Last menstrual period 07/04/2024.    Laboratory Results    No results found for this or any previous visit (from the past 24 hour(s)).    Radiology Results   No new imaging at this visit.    Assessment/Plan:  19-year-old female s/p right ankle arthroscopy with extensive debridement and syndesmotic stabilization from 08/01/2024 who presents for postoperative check.  I will recommend the patient continue nonweightbearing in her right lower extremity in a walking boot.  I have encouraged the patient to come out of the boot for gentle range of motion in the sagittal plane.  I will retain her sutures today.  She will continue on aspirin for DVT prophylaxis and I will plan to see the patient back in approximately 2 weeks for a repeat clinical and radiographic evaluation.  I anticipate beginning progressive weightbearing in the walking boot with crutch assist  and have coordinated with physical therapy to begin PT once cleared.  Upon return, patient will require three-view weightbearing or simulated weightbearing right ankle.    Antwon Washington MD, BINTA  Department of Orthopaedic Surgery  [facility]

## 2024-08-20 ENCOUNTER — APPOINTMENT (OUTPATIENT)
Dept: INTEGRATIVE MEDICINE | Facility: CLINIC | Age: 19
End: 2024-08-20
Payer: COMMERCIAL

## 2024-08-20 DIAGNOSIS — M99.03 SEGMENTAL AND SOMATIC DYSFUNCTION OF LUMBAR REGION: ICD-10-CM

## 2024-08-20 DIAGNOSIS — M54.59 POSTURAL LOW BACK PAIN: ICD-10-CM

## 2024-08-20 DIAGNOSIS — M99.02 SEGMENTAL AND SOMATIC DYSFUNCTION OF THORACIC REGION: Primary | ICD-10-CM

## 2024-08-20 DIAGNOSIS — M99.04 SEGMENTAL AND SOMATIC DYSFUNCTION OF SACRAL REGION: ICD-10-CM

## 2024-08-20 DIAGNOSIS — M99.01 SEGMENTAL AND SOMATIC DYSFUNCTION OF CERVICAL REGION: ICD-10-CM

## 2024-08-20 DIAGNOSIS — M79.9 POSTURAL STRAIN: ICD-10-CM

## 2024-08-20 DIAGNOSIS — M89.8X1 PERISCAPULAR PAIN: ICD-10-CM

## 2024-08-20 PROCEDURE — 98941 CHIROPRACT MANJ 3-4 REGIONS: CPT | Performed by: CHIROPRACTOR

## 2024-08-20 PROCEDURE — 97112 NEUROMUSCULAR REEDUCATION: CPT | Performed by: CHIROPRACTOR

## 2024-08-20 NOTE — PROGRESS NOTES
Subjective   Patient ID: Maribell Rosales is a 19 y.o. female who presents August 20, 2024 for chiropractic care.    [facility] - ED:     Today, the patient rates their degree of pain as a 7 out of 10 on the numeric pain rating scale.     Maribell returns for continued chiropractic care. She reports that in August, she had reconstructive surgery on her ankle. She states that since then she has been experiencing shoulder pain from using crutches and low back pain from lack of activity. The patient denies any changes in health since her last encounter and will follow up as scheduled.   ________________________________________  Initial exam:   Maribell Rosales presents for evaluation and treatment of left sided scapular pain and low back pain. She states that her left sided scapular pain started in mid-January. She denies any traumas, accidents, incidents, or injuries. She states that she has noticed a significant loss of strength in the left arm when compared to the right. She states that she predominately notices this decreased strength and some pain when performing lateral raises with weight. She reports that her secondary complaint of low back pain. She states that these symptoms have been present for years and occur intermittently. She states that standing for extended periods of time will aggravate her symptoms, causing a diffuse bilateral discomfort parallel to her belt line. She denies any radiating symptoms.     Maribell also reports a recent ankle injury. She states that that she was walking and suddenly felt pain along the the medial plantar surface of the foot, along the medial aspect of the achilles tendon and the lateral aspect of the achilles tendon. She states that her range of motion was decreased, she notes pain with palpation and difficulty with weight bearing movements. She denies any swelling or bruising.      Objective   Physical Exam  Neurological:      General: No focal deficit present.      Mental Status:  She is alert and oriented to person, place, and time.      Cranial Nerves: No dysarthria or facial asymmetry.      Sensory: Sensation is intact.      Motor: Motor function is intact.      Coordination: Coordination is intact.      Gait: Gait is intact.       Palpation of the following region(s) revealed:  Cervical: Upper trapezius left, hypertonicity and tenderness.  Levator scapulae left, hypertonicity and tenderness.  Thoracic: Thoracic paraspinals left, hypertonicity and tenderness.  Middle trapezius left, hypertonicity and tenderness.  Rhomboids left, hypertonicity and tenderness.  Latissimus dorsi left, hypertonicity and tenderness.  Lumbar: Lumbar paraspinals bilateral, muscular hypertonicity.    Segmental Joint(s): Segmental joint dysfunction was assessed with motion palpation and is identified in the following areas:  Cervical : C2 C5  Thoracic : T4 and T5  Lumbopelvic / Sacral SIJ : L5/S1 and R SIJ    Assessment/Plan   Today's Treatment Included: Chiropractic manipulation to the Segmental Joint(s) Cervical : C2 C4 C5 Segmental Joint(s) Lumbopelvic/Sacral SIJ : L4, L5/S1, R SIJ, and L SIJ Segmental Joint(s) Thoracic : T4, T5, T6, and T8   Treatment Techniques Used : Diversified CMT, Pelvic drop table technique, and Pelvic blocking technique  Neuromuscular Re-Education (14527): Start time: 2:45 pm End time: 3:15 pm  2 Units  Pin and stretch  Soft-Tissue manipulation  Integrative Dry Needling (IDN) - Needles in / out:  12.    Soft-tissue mobilization was performed in the following areas:   Thoracic Paraspinal mm. bilateral  Lumbar Paraspinal mm. bilateral and Quadratus Lumborum bilateral  Trapezius mm. Upper  and Middle  left, Levator Scap. left, Rhomboids left, Supraspinatus left, Infraspinatus left, and Deltoid mm. left    The patient tolerated today's treatment with little or no additional discomfort and was instructed to contact the office for questions or concerns.

## 2024-08-27 ENCOUNTER — OFFICE VISIT (OUTPATIENT)
Dept: ORTHOPEDIC SURGERY | Facility: CLINIC | Age: 19
End: 2024-08-27
Payer: COMMERCIAL

## 2024-08-27 ENCOUNTER — HOSPITAL ENCOUNTER (OUTPATIENT)
Dept: RADIOLOGY | Facility: CLINIC | Age: 19
Discharge: HOME | End: 2024-08-27
Payer: COMMERCIAL

## 2024-08-27 DIAGNOSIS — M76.821 POSTERIOR TIBIAL TENDON DYSFUNCTION, RIGHT: ICD-10-CM

## 2024-08-27 DIAGNOSIS — M25.871 ANKLE IMPINGEMENT SYNDROME, RIGHT: Primary | ICD-10-CM

## 2024-08-27 DIAGNOSIS — M25.871 ANKLE IMPINGEMENT SYNDROME, RIGHT: ICD-10-CM

## 2024-08-27 PROCEDURE — 73610 X-RAY EXAM OF ANKLE: CPT | Mod: RT

## 2024-08-27 PROCEDURE — 99211 OFF/OP EST MAY X REQ PHY/QHP: CPT | Performed by: STUDENT IN AN ORGANIZED HEALTH CARE EDUCATION/TRAINING PROGRAM

## 2024-08-27 PROCEDURE — 73610 X-RAY EXAM OF ANKLE: CPT | Mod: RIGHT SIDE | Performed by: RADIOLOGY

## 2024-08-27 ASSESSMENT — PAIN DESCRIPTION - DESCRIPTORS: DESCRIPTORS: ACHING;DULL;DISCOMFORT

## 2024-08-27 ASSESSMENT — PAIN - FUNCTIONAL ASSESSMENT: PAIN_FUNCTIONAL_ASSESSMENT: 0-10

## 2024-08-27 ASSESSMENT — PAIN SCALES - GENERAL: PAINLEVEL_OUTOF10: 2

## 2024-08-27 NOTE — PROGRESS NOTES
ORTHOPAEDIC SURGERY PROGRESS NOTE    Chief Complaint:  Right ankle pain    History Of Present Illness  Maribell Rosales is a 19 y.o. female who presents for evaluation of right ankle pain.  Patient reports the atraumatic onset of right ankle pain while walking approximately 6 weeks ago.  Patient denies specific injury or change in her activity or shoewear.  Pain is made worse with walking and standing.  She has never had a pain like this in the past.  She has tried dry needling as well as over-the-counter bracing and limiting her activity with persistent pain.  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  She denies any associated numbness, tingling or weakness.    04/25/2024: Patient returns for follow-up of her right ankle injury.  She has been in physical therapy and noting improvements with respect to her instability.  She reports that overall she is improving but continues with moderate pain in her ankle rated as 5 out of 10.  She has not had any recurrent brendon instability events.    06/11/2024: Patient returns for follow-up of her right ankle injury.  Patient continues with 8 out of 10 pain in her right ankle.  She is present with her mom today.  She reports several days of near complete pain relief following previous right ankle corticosteroid injection.  Unfortunately, she has continued with relatively severe and debilitating pain in her ankle.  She does notice a radiating pain that goes into her foot, this is reportedly about 30% of her overall pain.  Her more typical pain is on the outside of her ankle.  She has been unable to return to running due to pain.  She is obviously quite frustrated by this process.    08/14/2024: Patient returns s/p right ankle arthroscopy with extensive debridement and syndesmotic stabilization from 08/01/2024.  Patient is currently reporting 2 out of 10 pain that is gradually improving.  She  reports that the majority of her preoperative pain has actually resolved.  Patient has been compliant with nonweightbearing restrictions in a short leg fiberglass splint.  She is present with her mother today.    08/27/2024: Patient returns s/p right ankle arthroscopy with extensive debridement and syndesmotic stabilization from 08/01/2024.  Patient is currently reporting 2 out of 10 pain that is gradually improving.  She continues to note significant improvement from her preoperative state.  She has been ambulating with use of crutches and continued on aspirin.  She reports that pain at this point feels surgical.  She is present with her mother today.     Past Medical History  Past Medical History:   Diagnosis Date    Allergy to milk products     Anemia     GERD (gastroesophageal reflux disease)     Unspecified asthma, uncomplicated ([facility])     Childhood asthma - stable       Surgical History  Recent Surgeries in Orthopaedic Surgery            No cases to display             Social History  Social History     Socioeconomic History    Marital status: Single   Tobacco Use    Smoking status: Never     Passive exposure: Never    Smokeless tobacco: Never   Vaping Use    Vaping status: Never Used   Substance and Sexual Activity    Alcohol use: Never    Drug use: Never    Sexual activity: Defer       Family History  Family History   Problem Relation Name Age of Onset    No Known Problems Mother      No Known Problems Father      No Known Problems Brother          Allergies  Allergies   Allergen Reactions    Milk Containing Products (Dairy) Diarrhea       Review of Systems  REVIEW OF SYSTEMS  Constitutional: no unplanned weight loss  Psychiatric: no suicidal ideation  ENT: no vision changes, no sinus problems  Pulmonary: no shortness of breath  Lymphatic: no enlarged lymph nodes  Cardiovascular: no chest pain or shortness of breath  Gastrointestinal: no stomach problems  Genitourinary: no dysuria   Skin: no  rashes  Endocrine: no thyroid problems  Neurological: no headache, no numbness  Hematological: no easy bruising  Musculoskeletal: Right ankle pain    Physical Exam  PHYSICAL EXAMINATION  Constitutional Exam: well developed and well nourished  Psychiatric Exam: alert and oriented, appropriate mood and behavior  Eye Exam: EOMI  Pulmonary Exam: breathing non-labored, no apparent distress  Lymphatic exam: no appreciable lymphadenopathy in the lower extremities  Cardiovascular exam: RRR to peripheral palpation, DP pulses 2+, PT 2+, toes are pink with good capillary refill, no pitting edema  Skin exam: no open lesions, rashes, abrasions or ulcerations  Neurological exam: sensation to light touch intact in both lower extremities in peripheral and dermatomal distributions (except for any abnormalities noted in musculoskeletal exam)    Musculoskeletal exam: Right lower extremity examination.  Patient anterior ankle arthroscopy incisions well-healed, medial and lateral ankle incisions also well-healed.  There is no sariah-incisional erythema, duration, fluctuance or drainage.  There is appropriate gravity-dependent edema noted.  Patient tolerates short arcs of motion of the ankle without significant pain, I am able to passively range her to neutral dorsiflexion. She has sensation intact light touch grossly in a saphenous, sural, superficial peroneal, deep peroneal and tibial nerve distribution.  She has 5 out of 5 strength in plantarflexion, dorsiflexion and EHL.  She is 2+ DP/PT pulse palpated.    Last Recorded Vitals  Last menstrual period 07/04/2024.    Laboratory Results    No results found for this or any previous visit (from the past 24 hour(s)).    Radiology Results   No new imaging at this visit.  X-ray imaging 3 view weightbearing right ankle reviewed from 08/27/2024 and independently evaluated by me demonstrates no acute fracture or dislocation.  Ankle mortise appears well aligned with appropriate position alignment  following divergent flexible syndesmotic fixation with lateral fibular plate and cortical button construct.    Assessment/Plan:  19-year-old female s/p right ankle arthroscopy with extensive debridement and syndesmotic stabilization from 08/01/2024 who presents with both clinical and radiographic evidence of healing.  I would recommend the patient begin progressive weightbearing in her right lower extremity in the walking boot.  She may steadily wean from 2 crutches to 1 crutch as to none and then steadily out of the walking boot and into a lace up style ankle brace.  I will remove her sutures today and she will begin her scheduled physical therapy.  she will continue on aspirin for DVT prophylaxis, she has upcoming travel plans for which I will provide her a prescription of apixaban.  I reviewed that there is increased risk of DVT/PE in the setting of travel and recent surgery.  I will plan to see the patient back in approximate 1 month for repeat clinical and radiographic evaluation.  Upon return, patient would require three-view weightbearing right ankle.    Antwon Washington MD, BINTA  Department of Orthopaedic Surgery  [facility]

## 2024-09-02 NOTE — PROGRESS NOTES
Physical Therapy  Physical Therapy Orthopedic Evaluation    Patient Name: Maribell Rosales  MRN: [id number]  Today's Date: 9/3/2024  Time Calculation  Start Time: 1100  Stop Time: 1140  Time Calculation (min): 40 min    Insurance:  Visit number: 7 of 20  Authorization info: hard limit of 20  Insurance Type: [facility]    General:  Reason for visit: s/p right ankle arthroscopy with extensive debridement and syndesmotic stabilization from 08/01/2024   Referred by: Dr. Washington      Current Problem:  1. Acute right ankle pain        2. Ankle syndesmosis disruption, right, initial encounter          Procedure: s/p right ankle arthroscopy with extensive debridement and syndesmotic stabilization from 08/01/2024   Precautions: Able to wean from 2 crutches to 1 crutch to no crutches and wean from walking boot into lace up ASO brace. No inversion and eversion         Medical History Form: Reviewed (scanned into chart)    Subjective:     Chief Complaint: Patient presents to clinic about 1 month s/p R ankle arthroscopic debridement and syndesmotic stabilization on 8/1/24.   Onset Date: chronic  LINNETTE: no specific LINNETTE    Current Condition:   improving    Pain:     Location: lateral and medial aspect of R foot  Description: itchcing, tightness  Aggravating Factors: prolonged standing, walking stairs  Relieving Factors:  Elevation    Relevant Information (PMH & Previous Tests/Imaging): N/A  Previous Interventions/Treatments: Did have PT prior to surgery which was beneficial but had flare up in symptoms that exacerbated her pain/loss of function    Prior Level of Function (PLOF)  Patient previously independent with all ADLs  Exercise/Physical Activity: strength training, barre, running  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 Wants to return to driving and exercise.    Red Flags: Do you have any of the following? No  Fever/chills, unexplained weight changes, dizziness/fainting, unexplained change in bowel or bladder functions, unexplained malaise or muscle weakness, night pain/sweats, numbness or tingling    Objective:  Objective     Wells DVT Criteria:  Active Cancer (1):  no  Immobility >3 days or major surgery <4 weeks (1): yes  Calf swelling >3 cm compared with other calf (1): no  collateral (non varicose) superficial veins present (1): no  entire leg swollen (1): no  localized tenderness along deep venous system (1): no  pitting edema, greater in symptomatic leg (1): no  paralysis, paresis, or recent plaster immobilization of the lower extremity (1): no  previously documented DVT (1): no  alternative diagnosis to DVT as likely or more likely (-2): yes  -2-0: low risk   1-2 moderate risk  >3 high risk      Observation: Incisions healing well, sutures have been removed, no signs of infection      ROM          Ankle AROM (Degrees)      (R)  (L)  Plantarflexion: 50  70    Dorsiflexion: -8  10    Inversion:   45     Eversion:   15         Ankle PROM (Degrees)      (R)  (L)  Plantarflexion: 55       Dorsiflexion: 5      Inversion:        Eversion:                Strength Testing    Ankle    (R)  (L)  Plantarflexion: 3  5      Dorsiflexion: 3  5    Inversion: nt  5     Eversion: nt  5         Palpation: no pain with palpation, slight reduced sensation over surgical incisions      Ankle/Foot Joint Mobility: hypomobile R talocrural joint      Gait: ambulates in walking boot, no crutches    Effusion: 48.5 cm (figure 8)      Outcome Measures:  Other Measures  Lower Extremity Funtional Score (LEFS): 20/80       EDUCATION: Home exercise program, plan of care, activity modifications, pain management, and injury  "pathology       Goals: Set and discussed today  Active       PT Problem       Increase R ankle AROM to 15-20 degrees DF, 60-70 degrees PF and 20 degrees EV.        Start:  03/28/24    Expected End:  05/27/24            Increase R ankle MMT to 5/5 throughout and improve R single heel raise to at least 4\".       Start:  03/28/24    Expected End:  05/27/24            Increase length in R gastrocs to 10 degrees.       Start:  03/28/24    Expected End:  05/27/24            Pt will amb with proper HS/TO pattern and equal stance time/step length to restore normal gait pattern.       Start:  03/28/24    Expected End:  05/27/24            Pt will maintain R SLS for 30 seconds on firm surface without LOB.       Start:  03/28/24    Expected End:  05/27/24            Pt will report 0/10 R ankle pain to improve prolonged standing and walking, and allow return to exercise classes.       Start:  03/28/24    Expected End:  05/27/24            Improve LEFS by at least 9 points (MDIC).       Start:  03/28/24    Expected End:  05/27/24                Plan of care was developed with input and agreement by the patient    Treatment Performed:    Re-Eval: 10 minutes    Therapeutic Exercise:    20 min  Calf stretch with strap (gentle) 5x15 sec hold  Seated towel squeezes with toes 30x  Seated heel raises 30x  Toe yoga 30x  Seated toe raises 30x    Manual Therapy:    10 min  Gentle PF/DF PROM  R ankle PF PROM improved to 57 deg      Assessment: Patient presents with signs and symptoms consistent with s/p R ankle scope and syndesmotic stabilization surgery performed on 8/2/24, resulting in limited participation in pain-free ADLs and inability to perform at their prior level of function. Pt would benefit from physical therapy to address the impairments found & listed previously in the objective section in order to return to safe and pain-free ADLs and prior level of function.       Clinical Presentation: Stable and/or uncomplicated " characteristics    Plan:     Planned Interventions include: therapeutic exercise, self-care home management, manual therapy, therapeutic activities, gait training, neuromuscular coordination, vasopneumatic, dry needling, aquatic therapy  Frequency: 1-2 x Week  Duration: 12 Weeks  Rehab Potential/Prognosis: Good    Laine Clay, PT

## 2024-09-03 ENCOUNTER — TREATMENT (OUTPATIENT)
Dept: PHYSICAL THERAPY | Facility: HOSPITAL | Age: 19
End: 2024-09-03
Payer: COMMERCIAL

## 2024-09-03 DIAGNOSIS — M25.571 ACUTE RIGHT ANKLE PAIN: Primary | ICD-10-CM

## 2024-09-03 DIAGNOSIS — S93.431A ANKLE SYNDESMOSIS DISRUPTION, RIGHT, INITIAL ENCOUNTER: ICD-10-CM

## 2024-09-03 PROCEDURE — 97110 THERAPEUTIC EXERCISES: CPT | Mod: GP

## 2024-09-03 PROCEDURE — 97140 MANUAL THERAPY 1/> REGIONS: CPT | Mod: GP

## 2024-09-03 PROCEDURE — 97164 PT RE-EVAL EST PLAN CARE: CPT | Mod: GP

## 2024-09-03 NOTE — LETTER
September 3, 2024    Laine Clay PT  [address]   [facility]  [facility] [address]    Patient: Maribell Rosales   YOB: 2005   Date of Visit: 9/3/2024       Dear No referring provider defined for this encounter.    The attached plan of care is being sent to you because your patient’s medical reimbursement requires that you certify the plan of care. Your signature is required to allow uninterrupted insurance coverage.      You may indicate your approval by signing below and faxing this form back to us at Dept Fax: [phone].    Please call Dept: [phone] with any questions or concerns.    Thank you for this referral,        Laine Clay PT  [facility]  [address] [facility] [facility] [facility] [id number] [phone]    Payer: Payor: [facility] / Plan: [facility] / Product Type: *No Product type* /                                                                         Date:     Dear Laine Clay PT,     Re: Ms. Maribell Rosales, MRN:[id number]    I certify that I have reviewed the attached plan of care and it is medically necessary for Ms. Maribell Rosales (2005) who is under my care.          ______________________________________                    _________________  Provider name and credentials                                           Date and time                                                                                           Plan of Care 6/27/24   Effective from: 6/27/2024  Effective to: 12/24/2024    Plan ID: [id number]            Participants as of Finalize on 9/3/2024    Name Type Comments Contact Info    Antwon Washington MD Consulting Physician  [phone]    Laine Clay PT Physical Therapist  [phone]       Last Plan Note     Author: Laine Clay PT Status: Incomplete Last edited: 9/3/2024 11:00 AM         Physical Therapy  Physical Therapy Orthopedic Evaluation    Patient  Name: Maribell Rosales  MRN: [id number]  Today's Date: 9/3/2024       Insurance:  Visit number: 7 of 20  Authorization info: hard limit of 20  Insurance Type: [facility]    General:  Reason for visit: s/p right ankle arthroscopy with extensive debridement and syndesmotic stabilization from 08/01/2024   Referred by: Dr. Washington      Current Problem:  1. Acute right ankle pain        2. Ankle syndesmosis disruption, right, initial encounter          Procedure: s/p right ankle arthroscopy with extensive debridement and syndesmotic stabilization from 08/01/2024   Precautions: Able to wean from 2 crutches to 1 crutch to no crutches and wean from walking boot into lace up ASO brace. No inversion and eversion         Medical History Form: Reviewed (scanned into chart)    Subjective:     Chief Complaint: Patient presents to clinic about 1 month s/p R ankle arthroscopic debridement and syndesmotic stabilization on 8/1/24.   Onset Date: chronic  LINNETTE: no specific LINNETTE    Current Condition:   improving    Pain:     Location: lateral and medial aspect of R foot  Description: itchcing, tightness  Aggravating Factors: prolonged standing, walking stairs  Relieving Factors:  Elevation    Relevant Information (PMH & Previous Tests/Imaging): N/A  Previous Interventions/Treatments: Did have PT prior to surgery which was beneficial but had flare up in symptoms that exacerbated her pain/loss of function    Prior Level of Function (PLOF)  Patient previously independent with all ADLs  Exercise/Physical Activity: strength training, barre, running  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 Wants to return to driving and  exercise.    Red Flags: Do you have any of the following? No  Fever/chills, unexplained weight changes, dizziness/fainting, unexplained change in bowel or bladder functions, unexplained malaise or muscle weakness, night pain/sweats, numbness or tingling    Objective:  Objective    Wells DVT Criteria:  Active Cancer (1):  no  Immobility >3 days or major surgery <4 weeks (1): yes  Calf swelling >3 cm compared with other calf (1): no  collateral (non varicose) superficial veins present (1): no  entire leg swollen (1): no  localized tenderness along deep venous system (1): no  pitting edema, greater in symptomatic leg (1): no  paralysis, paresis, or recent plaster immobilization of the lower extremity (1): no  previously documented DVT (1): no  alternative diagnosis to DVT as likely or more likely (-2): yes  -2-0: low risk   1-2 moderate risk  >3 high risk      Observation: Incisions healing well, sutures have been removed, no signs of infection      ROM          Ankle AROM (Degrees)      (R)  (L)  Plantarflexion: 50  70    Dorsiflexion: -8  10    Inversion:   45     Eversion:   15         Ankle PROM (Degrees)      (R)  (L)  Plantarflexion: 55       Dorsiflexion: 5      Inversion:        Eversion:                Strength Testing    Ankle    (R)  (L)  Plantarflexion: 3  5      Dorsiflexion: 3  5    Inversion: nt  5     Eversion: nt  5         Palpation: no pain with palpation, slight reduced sensation over surgical incisions      Ankle/Foot Joint Mobility: hypomobile R talocrural joint      Gait: ambulates in walking boot, no crutches    Effusion: 48.5 cm (figure 8)      Outcome Measures:  Other Measures  Lower Extremity Funtional Score (LEFS): 20/80       EDUCATION: Home exercise program, plan of care, activity modifications, pain management, and injury pathology       Goals: Set and discussed today  Active       PT Problem       Increase R ankle AROM to 15-20 degrees DF, 60-70 degrees PF and 20 degrees EV.         "Start:  03/28/24    Expected End:  05/27/24            Increase R ankle MMT to 5/5 throughout and improve R single heel raise to at least 4\".       Start:  03/28/24    Expected End:  05/27/24            Increase length in R gastrocs to 10 degrees.       Start:  03/28/24    Expected End:  05/27/24            Pt will amb with proper HS/TO pattern and equal stance time/step length to restore normal gait pattern.       Start:  03/28/24    Expected End:  05/27/24            Pt will maintain R SLS for 30 seconds on firm surface without LOB.       Start:  03/28/24    Expected End:  05/27/24            Pt will report 0/10 R ankle pain to improve prolonged standing and walking, and allow return to exercise classes.       Start:  03/28/24    Expected End:  05/27/24            Improve LEFS by at least 9 points (MDIC).       Start:  03/28/24    Expected End:  05/27/24                Plan of care was developed with input and agreement by the patient    Treatment Performed:      Therapeutic Exercise:    *** min  Calf stretch with strap (gentle) 5x15 sec hold  Seated towel squeezes with toes 30x  Seated heel raises 30x  Toe yoga 30x  Seated toe raises 30x    Manual Therapy:    10 min  Gentle PF/DF PROM  R ankle PF PROM improved to 57 deg      Other:     *** min  ***                              Assessment: Patient presents with signs and symptoms consistent with s/p R ankle scope and syndesmotic stabilization surgery performed on 8/2/24, resulting in limited participation in pain-free ADLs and inability to perform at their prior level of function. Pt would benefit from physical therapy to address the impairments found & listed previously in the objective section in order to return to safe and pain-free ADLs and prior level of function.       Clinical Presentation: Stable and/or uncomplicated characteristics    Plan:     Planned Interventions include: therapeutic exercise, self-care home management, manual therapy, therapeutic " activities, gait training, neuromuscular coordination, vasopneumatic, dry needling, aquatic therapy  Frequency: 1-2 x Week  Duration: 12 Weeks  Rehab Potential/Prognosis: Good    Laine Clay PT           Current Participants as of 9/3/2024    Name Type Comments Contact Info    Antwon Washington MD Consulting Physician  [phone]    Signature pending    Laine Clay PT Physical Therapist  [phone]    Signature pending

## 2024-09-05 ENCOUNTER — TREATMENT (OUTPATIENT)
Dept: PHYSICAL THERAPY | Facility: HOSPITAL | Age: 19
End: 2024-09-05
Payer: COMMERCIAL

## 2024-09-05 DIAGNOSIS — S93.431A ANKLE SYNDESMOSIS DISRUPTION, RIGHT, INITIAL ENCOUNTER: ICD-10-CM

## 2024-09-05 DIAGNOSIS — M25.571 ACUTE RIGHT ANKLE PAIN: ICD-10-CM

## 2024-09-05 PROCEDURE — 97016 VASOPNEUMATIC DEVICE THERAPY: CPT | Mod: GP

## 2024-09-05 PROCEDURE — 97140 MANUAL THERAPY 1/> REGIONS: CPT | Mod: GP

## 2024-09-05 PROCEDURE — 97110 THERAPEUTIC EXERCISES: CPT | Mod: GP

## 2024-09-05 NOTE — PROGRESS NOTES
Physical Therapy  Physical Therapy Treatment Note    Patient Name: Maribell Rosales  MRN: [id number]  Today's Date: 9/5/2024  Time Calculation  Start Time: 0905  Stop Time: 1002  Time Calculation (min): 57 min    Insurance:  Visit number: 8 of 20  Authorization info: hard limit of 20  Insurance Type: [facility]    General:  Reason for visit: s/p right ankle arthroscopy with extensive debridement and syndesmotic stabilization from 08/01/2024   Referred by: Dr. Washington      Current Problem:  1. Acute right ankle pain  Follow Up In Physical Therapy      2. Ankle syndesmosis disruption, right, initial encounter  Follow Up In Physical Therapy        Procedure: s/p right ankle arthroscopy with extensive debridement and syndesmotic stabilization from 08/01/2024   Precautions: Able to wean from 2 crutches to 1 crutch to no crutches and wean from walking boot into lace up ASO brace. No inversion and eversion         Medical History Form: Reviewed (scanned into chart)    Subjective:     Visit #8: Patient states she was pretty sore into the rest of the day after last session, states ankle is sore - reporting 6/10.      Pain:  6/10 around the ankle    Red Flags: Do you have any of the following? No  Fever/chills, unexplained weight changes, dizziness/fainting, unexplained change in bowel or bladder functions, unexplained malaise or muscle weakness, night pain/sweats, numbness or tingling    Objective:  Objective     Wells DVT Criteria:  Active Cancer (1):  no  Immobility >3 days or major surgery <4 weeks (1): yes  Calf swelling >3 cm compared with other calf (1): no  collateral (non varicose) superficial veins present (1): no  entire leg swollen (1): no  localized tenderness along deep venous system (1): no  pitting edema, greater in symptomatic leg (1): no  paralysis, paresis, or recent plaster immobilization of the lower extremity (1): no  previously documented DVT (1): no  alternative diagnosis to DVT as likely or more likely (-2):  "yes  -2-0: low risk   1-2 moderate risk  >3 high risk      Observation: Incisions healing well, sutures have been removed, no signs of infection      ROM          Ankle AROM (Degrees)      (R)  (L)  Plantarflexion: 65  70    Dorsiflexion: 0  10    Inversion:   45     Eversion:   15         Ankle PROM (Degrees)      (R)  (L)  Plantarflexion: 55       Dorsiflexion: 5      Inversion:        Eversion:                Strength Testing    Ankle    (R)  (L)  Plantarflexion: 3  5      Dorsiflexion: 3  5    Inversion: nt  5     Eversion: nt  5         Palpation: no pain with palpation, slight reduced sensation over surgical incisions      Ankle/Foot Joint Mobility: hypomobile R talocrural joint      Gait: ambulates in walking boot, no crutches    Effusion: 48.5 cm (figure 8)      Outcome Measures:          EDUCATION: Home exercise program, plan of care, activity modifications, pain management, and injury pathology       Goals: Set and discussed today  Active       PT Problem       Increase R ankle AROM to 15-20 degrees DF, 60-70 degrees PF and 20 degrees EV.        Start:  03/28/24    Expected End:  05/27/24            Increase R ankle MMT to 5/5 throughout and improve R single heel raise to at least 4\".       Start:  03/28/24    Expected End:  05/27/24            Increase length in R gastrocs to 10 degrees.       Start:  03/28/24    Expected End:  05/27/24            Pt will amb with proper HS/TO pattern and equal stance time/step length to restore normal gait pattern.       Start:  03/28/24    Expected End:  05/27/24            Pt will maintain R SLS for 30 seconds on firm surface without LOB.       Start:  03/28/24    Expected End:  05/27/24            Pt will report 0/10 R ankle pain to improve prolonged standing and walking, and allow return to exercise classes.       Start:  03/28/24    Expected End:  05/27/24            Improve LEFS by at least 9 points (MDIC).       Start:  03/28/24    Expected End:  05/27/24          "       Plan of care was developed with input and agreement by the patient    Treatment Performed:      Therapeutic Exercise:    30 min  Calf stretch with strap (gentle) 5x15 sec hold  Ankle df/pf baps board level 1 20x  Seated towel squeezes with toes 30x  Seated heel raises 30x  Toe yoga 30x  Standing weight shifting side to side 20x each way  Standing weight shifting forward/back 20x each position  Seated toe raises 30x  Standing tandem balance 20-30 sec each position    Manual Therapy:    10 min  Gentle PF/DF PROM  R ankle PF PROM improved to 57 deg    Vaso to R ankle med compression, 15 minutes      Assessment: Maribell Rosales is progressing towards their goals as evidenced by compliance with HEP, improving ankle ROM. She does report increased soreness but this was to be expected after last session as this was her first time doing much of anything since her surgery.  Today's treatment was progressed by adding weight bearing exercises out of her boot- including side to side weight shifting, forward/back and tandem stance. She did well- was challenged by tandem stance but did not report any pain. She does report it feels weird putting all weight through the leg but denies pain. She does report some increased tightness/pulling through ant aspect of the ankle but overall tolerating ROM progression well and demonstrates improved ankle DF and PF ROM today.  No adverse effects to today's session- will hold off on d/cing boot until she is more comfortable with weight bearing exercises outside of boot and less sore.  Patient would continue to benefit from skilled PT to address remaining functional, objective and subjective deficits to allow them to return to full independence with ADLs and iADLs.         Clinical Presentation: Stable and/or uncomplicated characteristics    Plan:     Planned Interventions include: therapeutic exercise, self-care home management, manual therapy, therapeutic activities, gait training,  neuromuscular coordination, vasopneumatic, dry needling, aquatic therapy  Frequency: 1-2 x Week  Duration: 12 Weeks  Rehab Potential/Prognosis: Good    Laine Clay, PT

## 2024-09-10 ENCOUNTER — TREATMENT (OUTPATIENT)
Dept: PHYSICAL THERAPY | Facility: HOSPITAL | Age: 19
End: 2024-09-10
Payer: COMMERCIAL

## 2024-09-10 ENCOUNTER — APPOINTMENT (OUTPATIENT)
Dept: INTEGRATIVE MEDICINE | Facility: CLINIC | Age: 19
End: 2024-09-10
Payer: COMMERCIAL

## 2024-09-10 DIAGNOSIS — S93.431A ANKLE SYNDESMOSIS DISRUPTION, RIGHT, INITIAL ENCOUNTER: ICD-10-CM

## 2024-09-10 DIAGNOSIS — M25.571 ACUTE RIGHT ANKLE PAIN: Primary | ICD-10-CM

## 2024-09-10 DIAGNOSIS — M89.8X1 PAIN IN SCAPULA: ICD-10-CM

## 2024-09-10 PROCEDURE — 97140 MANUAL THERAPY 1/> REGIONS: CPT | Mod: GP

## 2024-09-10 PROCEDURE — 97116 GAIT TRAINING THERAPY: CPT | Mod: GP

## 2024-09-10 PROCEDURE — 97110 THERAPEUTIC EXERCISES: CPT | Mod: GP

## 2024-09-10 PROCEDURE — 97016 VASOPNEUMATIC DEVICE THERAPY: CPT | Mod: GP

## 2024-09-10 NOTE — PROGRESS NOTES
Physical Therapy  Physical Therapy Treatment Note    Patient Name: Maribell Rosales  MRN: [id number]  Today's Date: 9/10/2024  Time Calculation  Start Time: 1304  Stop Time: 1420  Time Calculation (min): 76 min    Insurance:  Visit number: 9 of 20  Authorization info: hard limit of 20  Insurance Type: [facility]    General:  Reason for visit: s/p right ankle arthroscopy with extensive debridement and syndesmotic stabilization from 08/01/2024   Referred by: Dr. Washington      Current Problem:  1. Acute right ankle pain  Follow Up In Physical Therapy      2. Ankle syndesmosis disruption, right, initial encounter  Follow Up In Physical Therapy      3. Pain in scapula          Procedure: s/p right ankle arthroscopy with extensive debridement and syndesmotic stabilization from 08/01/2024   Precautions: Able to wean from 2 crutches to 1 crutch to no crutches and wean from walking boot into lace up ASO brace. No inversion and eversion         Medical History Form: Reviewed (scanned into chart)    Subjective:     Visit #9: She is about 5 1/2 weeks post op at this point, compliant with  HEP. She states she was in [place] over the weekend, did quite a bit of walking, tried to keep it in 5 min increments of walking. She states she did notice some increased swelling afterwards. She states soreness coming in today as 2/10    Pain:  2/10 around the ankle    Red Flags: Do you have any of the following? No  Fever/chills, unexplained weight changes, dizziness/fainting, unexplained change in bowel or bladder functions, unexplained malaise or muscle weakness, night pain/sweats, numbness or tingling    Objective:  Objective     Wells DVT Criteria:  Active Cancer (1):  no  Immobility >3 days or major surgery <4 weeks (1): yes  Calf swelling >3 cm compared with other calf (1): no  collateral (non varicose) superficial veins present (1): no  entire leg swollen (1): no  localized tenderness along deep venous system (1): no  pitting edema, greater in  "symptomatic leg (1): no  paralysis, paresis, or recent plaster immobilization of the lower extremity (1): no  previously documented DVT (1): no  alternative diagnosis to DVT as likely or more likely (-2): yes  -2-0: low risk   1-2 moderate risk  >3 high risk      Observation: Incisions healing well, sutures have been removed, no signs of infection      ROM          Ankle AROM (Degrees)      (R)  (L)  Plantarflexion: 59  70    Dorsiflexion: 10  10    Inversion:   45     Eversion:   15         Ankle PROM (Degrees)      (R)  (L)  Plantarflexion: 55       Dorsiflexion: 5      Inversion:        Eversion:                Strength Testing    Ankle    (R)  (L)  Plantarflexion: 3  5      Dorsiflexion: 3  5    Inversion: nt  5     Eversion: nt  5         Palpation: no pain with palpation, slight reduced sensation over surgical incisions      Ankle/Foot Joint Mobility: hypomobile R talocrural joint      Gait: ambulates in walking boot, no crutches    Effusion: 48.5 cm (figure 8)      Outcome Measures:          EDUCATION: Home exercise program, plan of care, activity modifications, pain management, and injury pathology       Goals: Set and discussed today  Active       PT Problem       Increase R ankle AROM to 15-20 degrees DF, 60-70 degrees PF and 20 degrees EV.        Start:  03/28/24    Expected End:  05/27/24            Increase R ankle MMT to 5/5 throughout and improve R single heel raise to at least 4\".       Start:  03/28/24    Expected End:  05/27/24            Increase length in R gastrocs to 10 degrees.       Start:  03/28/24    Expected End:  05/27/24            Pt will amb with proper HS/TO pattern and equal stance time/step length to restore normal gait pattern.       Start:  03/28/24    Expected End:  05/27/24            Pt will maintain R SLS for 30 seconds on firm surface without LOB.       Start:  03/28/24    Expected End:  05/27/24            Pt will report 0/10 R ankle pain to improve prolonged standing and " walking, and allow return to exercise classes.       Start:  03/28/24    Expected End:  05/27/24            Improve LEFS by at least 9 points (MDIC).       Start:  03/28/24    Expected End:  05/27/24                  Plan of care was developed with input and agreement by the patient    Treatment Performed:      Therapeutic Exercise:    30 min  Calf stretch with strap (gentle) 5x15 sec hold  Ankle DF/PF with yellow t band 3x10  Seated ankle PF with heel elevated on towel 3x10  Seated towel squeezes with toes 30x  Toe yoga 30x  Standing weight shifting side to side 20x each way  Standing weight shifting forward/back 20x each position  Standing heel raises in alter g 50% BW 3x10  Standing tandem balance 20-30 sec each position- HEP    Gait Training: 10 minutes   Alter g 65% BW- cues for proper gait pattern     Manual Therapy:    10 min  Gentle PF/DF PROM  Desensitization to scars 5 min (pillowcase)  R ankle PF PROM    Vaso to R ankle med compression, 15 minutes      Assessment: Maribell Rosales is progressing towards their goals as evidenced by compliance with HEP, reporting less pain today than she had last session.  Today's treatment was progressed by adding gait training in alter g with lace up brace and tennis shoe. She was able to perform walking in alter g at 65% BW, as well as DL heel raises with 50% body weight. Her quality of gait sig improved after gait training compared to how it looked prior to gait training.  Manual/Verbal/Tactile cues provided during gait training to ensure heel strike during initial contact and push off during toe off phase. No adverse effects from today's session. Added resistance band DF and PF with light t band.  Patient would continue to benefit from skilled PT to address remaining functional, objective and subjective deficits to allow them to return to full independence with ADLs and iADLs.           Clinical Presentation: Stable and/or uncomplicated characteristics    Plan:      Planned Interventions include: therapeutic exercise, self-care home management, manual therapy, therapeutic activities, gait training, neuromuscular coordination, vasopneumatic, dry needling, aquatic therapy  Frequency: 1-2 x Week  Duration: 12 Weeks  Rehab Potential/Prognosis: Good    Laine Clay, PT

## 2024-09-12 ENCOUNTER — APPOINTMENT (OUTPATIENT)
Dept: INTEGRATIVE MEDICINE | Facility: CLINIC | Age: 19
End: 2024-09-12
Payer: COMMERCIAL

## 2024-09-12 ENCOUNTER — APPOINTMENT (OUTPATIENT)
Dept: PHYSICAL THERAPY | Facility: HOSPITAL | Age: 19
End: 2024-09-12
Payer: COMMERCIAL

## 2024-09-17 ENCOUNTER — TREATMENT (OUTPATIENT)
Dept: PHYSICAL THERAPY | Facility: HOSPITAL | Age: 19
End: 2024-09-17
Payer: COMMERCIAL

## 2024-09-17 DIAGNOSIS — M25.571 ACUTE RIGHT ANKLE PAIN: Primary | ICD-10-CM

## 2024-09-17 DIAGNOSIS — S93.431A ANKLE SYNDESMOSIS DISRUPTION, RIGHT, INITIAL ENCOUNTER: ICD-10-CM

## 2024-09-17 PROCEDURE — 97116 GAIT TRAINING THERAPY: CPT | Mod: GP

## 2024-09-17 PROCEDURE — 97016 VASOPNEUMATIC DEVICE THERAPY: CPT | Mod: GP

## 2024-09-17 PROCEDURE — 97110 THERAPEUTIC EXERCISES: CPT | Mod: GP

## 2024-09-17 PROCEDURE — 97140 MANUAL THERAPY 1/> REGIONS: CPT | Mod: GP

## 2024-09-17 NOTE — PROGRESS NOTES
Physical Therapy  Physical Therapy Treatment Note    Patient Name: Maribell Rosales  MRN: [id number]  Today's Date: 9/17/2024  Time Calculation  Start Time: 1000  Stop Time: 1111  Time Calculation (min): 71 min    Insurance:  Visit number: 10 of 20  Authorization info: hard limit of 20  Insurance Type: [facility]    General:  Reason for visit: s/p right ankle arthroscopy with extensive debridement and syndesmotic stabilization from 08/01/2024   Referred by: Dr. Washington      Current Problem:  1. Acute right ankle pain  Follow Up In Physical Therapy      2. Ankle syndesmosis disruption, right, initial encounter  Follow Up In Physical Therapy        Procedure: s/p right ankle arthroscopy with extensive debridement and syndesmotic stabilization from 08/01/2024   Precautions: Able to wean from 2 crutches to 1 crutch to no crutches and wean from walking boot into lace up ASO brace. No inversion and eversion         Medical History Form: Reviewed (scanned into chart)    Subjective:     Visit #10: She is about 6 1/2 weeks post op at this point, compliant with  HEP. She has been weaning out of her boot about 50/50 time spent in boot and time spent in her lace up ASO bace    Pain:  0/10-5/10 around the ankle    Red Flags: Do you have any of the following? No  Fever/chills, unexplained weight changes, dizziness/fainting, unexplained change in bowel or bladder functions, unexplained malaise or muscle weakness, night pain/sweats, numbness or tingling    Objective:  Objective     Wells DVT Criteria:  Active Cancer (1):  no  Immobility >3 days or major surgery <4 weeks (1): yes  Calf swelling >3 cm compared with other calf (1): no  collateral (non varicose) superficial veins present (1): no  entire leg swollen (1): no  localized tenderness along deep venous system (1): no  pitting edema, greater in symptomatic leg (1): no  paralysis, paresis, or recent plaster immobilization of the lower extremity (1): no  previously documented DVT (1):  "no  alternative diagnosis to DVT as likely or more likely (-2): yes  -2-0: low risk   1-2 moderate risk  >3 high risk      Observation: Incisions healing well, sutures have been removed, no signs of infection      ROM          Ankle AROM (Degrees)      (R)  (L)  Plantarflexion: 68  70    Dorsiflexion: 10  10    Inversion:   45     Eversion:   15         Ankle PROM (Degrees)      (R)  (L)  Plantarflexion: 55       Dorsiflexion: 5      Inversion:        Eversion:                Strength Testing    Ankle    (R)  (L)  Plantarflexion: 3  5      Dorsiflexion: 3  5    Inversion: nt  5     Eversion: nt  5         Palpation: no pain with palpation, slight reduced sensation over surgical incisions      Ankle/Foot Joint Mobility: hypomobile R talocrural joint      Gait: ambulates in walking boot, no crutches    Effusion: 48.5 cm (figure 8)      Outcome Measures:          EDUCATION: Home exercise program, plan of care, activity modifications, pain management, and injury pathology       Goals: Set and discussed today  Active       PT Problem       Increase R ankle AROM to 15-20 degrees DF, 60-70 degrees PF and 20 degrees EV.        Start:  03/28/24    Expected End:  05/27/24            Increase R ankle MMT to 5/5 throughout and improve R single heel raise to at least 4\".       Start:  03/28/24    Expected End:  05/27/24            Increase length in R gastrocs to 10 degrees.       Start:  03/28/24    Expected End:  05/27/24            Pt will amb with proper HS/TO pattern and equal stance time/step length to restore normal gait pattern.       Start:  03/28/24    Expected End:  05/27/24            Pt will maintain R SLS for 30 seconds on firm surface without LOB.       Start:  03/28/24    Expected End:  05/27/24            Pt will report 0/10 R ankle pain to improve prolonged standing and walking, and allow return to exercise classes.       Start:  03/28/24    Expected End:  05/27/24            Improve LEFS by at least 9 points " (THOMAS).       Start:  03/28/24    Expected End:  05/27/24                    Plan of care was developed with input and agreement by the patient    Treatment Performed:      Therapeutic Exercise:    30 min    Bike 5 minutes  Wedge stretch for DF/PF 3x30 sec  Calf stretch with strap (gentle) 5x15 sec hold  Ankle DF/PF with red t band 3x12  Seated ankle PF with heel elevated on towel 3x10  Isometric ankle inv/ev 5x5 sec hold each  Gait training over small hurdles 15x  Seated towel squeezes with toes 30x  Toe yoga 30x  Standing weight shifting side to side 20x each way  Standing weight shifting forward/back 20x each position  Standing heel raises in alter g 50% BW 3x10  Standing tandem balance 20-30 sec each position- HEP    Gait Training: 10 minutes   Walking up and over cones without brace   Walking in clinic with normalized gait pattern    Manual Therapy:    15min  Gentle PF/DF PROM  STM to right peroneals  R ankle PF PROM    Vaso to R ankle med compression, 15 minutes      Assessment: Maribell Rosales is progressing towards their goals as evidenced by compliance with HEP, reporting less pain today than she had last session, comes in walking out of walking boot and in normal ASO brace. Improving ROM.  Today's treatment was progressed by adding STM to peroneals as she is using these more during her walking since she is out of boot now, also added isometric ankle inv/ev and added gait training out of brace in shoes to normalize and use her ROM appropriately.  Manual/Verbal/Tactile cues provided during gait training to ensure heel strike during initial contact and push off during toe off phase. No adverse effects from today's session. Added resistance band DF and PF with light t band.  Patient would continue to benefit from skilled PT to address remaining functional, objective and subjective deficits to allow them to return to full independence with ADLs and iADLs.         Clinical Presentation: Stable and/or  uncomplicated characteristics    Plan:     Planned Interventions include: therapeutic exercise, self-care home management, manual therapy, therapeutic activities, gait training, neuromuscular coordination, vasopneumatic, dry needling, aquatic therapy  Frequency: 1-2 x Week  Duration: 12 Weeks  Rehab Potential/Prognosis: Good    Laine Clay, PT

## 2024-09-19 ENCOUNTER — TREATMENT (OUTPATIENT)
Dept: PHYSICAL THERAPY | Facility: HOSPITAL | Age: 19
End: 2024-09-19
Payer: COMMERCIAL

## 2024-09-19 DIAGNOSIS — S93.431A ANKLE SYNDESMOSIS DISRUPTION, RIGHT, INITIAL ENCOUNTER: ICD-10-CM

## 2024-09-19 DIAGNOSIS — M25.571 ACUTE RIGHT ANKLE PAIN: Primary | ICD-10-CM

## 2024-09-19 PROCEDURE — 97110 THERAPEUTIC EXERCISES: CPT | Mod: GP

## 2024-09-19 PROCEDURE — 97016 VASOPNEUMATIC DEVICE THERAPY: CPT | Mod: GP

## 2024-09-19 PROCEDURE — 97140 MANUAL THERAPY 1/> REGIONS: CPT | Mod: GP

## 2024-09-19 NOTE — PROGRESS NOTES
Physical Therapy  Physical Therapy Treatment Note    Patient Name: Maribell Rosaels  MRN: [id number]  Today's Date: 9/19/2024  Time Calculation  Start Time: 1102  Stop Time: 1210  Time Calculation (min): 68 min    Insurance:  Visit number: 11 of 20  Authorization info: hard limit of 20  Insurance Type: [facility]    General:  Reason for visit: s/p right ankle arthroscopy with extensive debridement and syndesmotic stabilization from 08/01/2024   Referred by: Dr. Washington      Current Problem:  1. Acute right ankle pain  Follow Up In Physical Therapy      2. Ankle syndesmosis disruption, right, initial encounter  Follow Up In Physical Therapy        Procedure: s/p right ankle arthroscopy with extensive debridement and syndesmotic stabilization from 08/01/2024   Precautions: Able to wean from 2 crutches to 1 crutch to no crutches and wean from walking boot into lace up ASO brace. No inversion and eversion         Medical History Form: Reviewed (scanned into chart)    Subjective:     Visit #11: She is exactly 7 weeks post op at this point. She has weaned herself out of her boot altogether. When she is out of the house, she continues to wear her lace up ASO brace (reports in it today) and at home she does not utilize any DME. She notes she is a little more sore than normal today but attributes that to completing a 20 minute walk yesterday. Otherwise, she is doing well and remains compliant with her HEP.    Pain:  3/10 around the ankle    Red Flags: Do you have any of the following? No  Fever/chills, unexplained weight changes, dizziness/fainting, unexplained change in bowel or bladder functions, unexplained malaise or muscle weakness, night pain/sweats, numbness or tingling    Objective:  Objective     Wells DVT Criteria:  Active Cancer (1):  no  Immobility >3 days or major surgery <4 weeks (1): yes  Calf swelling >3 cm compared with other calf (1): no  collateral (non varicose) superficial veins present (1): no  entire leg  "swollen (1): no  localized tenderness along deep venous system (1): no  pitting edema, greater in symptomatic leg (1): no  paralysis, paresis, or recent plaster immobilization of the lower extremity (1): no  previously documented DVT (1): no  alternative diagnosis to DVT as likely or more likely (-2): yes  -2-0: low risk   1-2 moderate risk  >3 high risk      Observation: Incisions healing well, sutures have been removed, no signs of infection      ROM          Ankle AROM (Degrees)      (R)  (L)  Plantarflexion: 68  70    Dorsiflexion: 10  10    Inversion:   45     Eversion:   15         Ankle PROM (Degrees)      (R)  (L)  Plantarflexion: 55       Dorsiflexion: 5      Inversion:        Eversion:                Strength Testing    Ankle    (R)  (L)  Plantarflexion: 3  5      Dorsiflexion: 3  5    Inversion: nt  5     Eversion: nt  5         Palpation: no pain with palpation, slight reduced sensation over surgical incisions      Ankle/Foot Joint Mobility: hypomobile R talocrural joint      Gait: ambulates in walking boot, no crutches    Effusion: 48.5 cm (figure 8)      Outcome Measures:          EDUCATION: Home exercise program, plan of care, activity modifications, pain management, and injury pathology       Goals: Set and discussed today  Active       PT Problem       Increase R ankle AROM to 15-20 degrees DF, 60-70 degrees PF and 20 degrees EV.        Start:  03/28/24    Expected End:  05/27/24            Increase R ankle MMT to 5/5 throughout and improve R single heel raise to at least 4\".       Start:  03/28/24    Expected End:  05/27/24            Increase length in R gastrocs to 10 degrees.       Start:  03/28/24    Expected End:  05/27/24            Pt will amb with proper HS/TO pattern and equal stance time/step length to restore normal gait pattern.       Start:  03/28/24    Expected End:  05/27/24            Pt will maintain R SLS for 30 seconds on firm surface without LOB.       Start:  03/28/24    " Expected End:  05/27/24            Pt will report 0/10 R ankle pain to improve prolonged standing and walking, and allow return to exercise classes.       Start:  03/28/24    Expected End:  05/27/24            Improve LEFS by at least 9 points (MDIC).       Start:  03/28/24    Expected End:  05/27/24                      Plan of care was developed with input and agreement by the patient    Treatment Performed:      Therapeutic Exercise:    50 min    Bike 5 minutes  Wedge stretch for DF/PF 3x30 sec  Calf stretch with strap (gentle) 5x15 sec hold  Ankle DF/PF with red t band 3x12  Seated ankle PF with heel elevated on towel 3x10  Isometric ankle inv/ev 5x5 sec hold each  CKC ankle DF mobility at wall 20x  SL press jump  level 1 3x12  DL heel raise jump  level 1 3x10  Heel elevated body weight squat 2x10 (mirror for feedback)      Manual Therapy:    13 min  Gentle PF/DF PROM  STM to right peroneals  R ankle PF PROM    Vaso to R ankle med compression, 5 minutes      Assessment: Maribell Rosales is progressing towards their goals as evidenced by compliance with HEP, reporting less pain today than she had last session, walking in ASO lace up brace, ditched the walking boot. Has some soreness but denies true pain.  Today's treatment was progressed by adding body weight squat, SL press on jump  at 50% body weight and 50% BW heel raises. Continued STM to peroneals as she is using these more during her walking since she is out of boot now, also added CKC ankle DF mobility at wall to improve mechanics of talocrural joint with walking.  Manual/Verbal/Tactile cues provided during gait training to ensure heel strike during initial contact and push off during toe off phase. No adverse effects from today's session.  Patient would continue to benefit from skilled PT to address remaining functional, objective and subjective deficits to allow them to return to full independence with ADLs and iADLs.         Clinical  Presentation: Stable and/or uncomplicated characteristics    Plan:     Planned Interventions include: therapeutic exercise, self-care home management, manual therapy, therapeutic activities, gait training, neuromuscular coordination, vasopneumatic, dry needling, aquatic therapy  Frequency: 1-2 x Week  Duration: 12 Weeks  Rehab Potential/Prognosis: Alcides May, ATC      Laine Clay, PT

## 2024-09-24 ENCOUNTER — HOSPITAL ENCOUNTER (OUTPATIENT)
Dept: RADIOLOGY | Facility: CLINIC | Age: 19
Discharge: HOME | End: 2024-09-24
Payer: COMMERCIAL

## 2024-09-24 ENCOUNTER — TREATMENT (OUTPATIENT)
Dept: PHYSICAL THERAPY | Facility: HOSPITAL | Age: 19
End: 2024-09-24
Payer: COMMERCIAL

## 2024-09-24 ENCOUNTER — OFFICE VISIT (OUTPATIENT)
Dept: ORTHOPEDIC SURGERY | Facility: CLINIC | Age: 19
End: 2024-09-24
Payer: COMMERCIAL

## 2024-09-24 DIAGNOSIS — M76.821 POSTERIOR TIBIAL TENDON DYSFUNCTION, RIGHT: ICD-10-CM

## 2024-09-24 DIAGNOSIS — S93.431A ANKLE SYNDESMOSIS DISRUPTION, RIGHT, INITIAL ENCOUNTER: ICD-10-CM

## 2024-09-24 DIAGNOSIS — M25.571 ACUTE RIGHT ANKLE PAIN: Primary | ICD-10-CM

## 2024-09-24 DIAGNOSIS — M25.871 ANKLE IMPINGEMENT SYNDROME, RIGHT: ICD-10-CM

## 2024-09-24 DIAGNOSIS — S93.431A ANKLE SYNDESMOSIS DISRUPTION, RIGHT, INITIAL ENCOUNTER: Primary | ICD-10-CM

## 2024-09-24 PROCEDURE — 97140 MANUAL THERAPY 1/> REGIONS: CPT | Mod: GP

## 2024-09-24 PROCEDURE — 99211 OFF/OP EST MAY X REQ PHY/QHP: CPT | Performed by: STUDENT IN AN ORGANIZED HEALTH CARE EDUCATION/TRAINING PROGRAM

## 2024-09-24 PROCEDURE — 73610 X-RAY EXAM OF ANKLE: CPT | Mod: RT

## 2024-09-24 PROCEDURE — 97110 THERAPEUTIC EXERCISES: CPT | Mod: GP

## 2024-09-24 PROCEDURE — 97016 VASOPNEUMATIC DEVICE THERAPY: CPT | Mod: GP

## 2024-09-24 PROCEDURE — 73610 X-RAY EXAM OF ANKLE: CPT | Mod: RIGHT SIDE | Performed by: RADIOLOGY

## 2024-09-24 ASSESSMENT — PAIN - FUNCTIONAL ASSESSMENT: PAIN_FUNCTIONAL_ASSESSMENT: 0-10

## 2024-09-24 ASSESSMENT — PAIN SCALES - GENERAL: PAINLEVEL_OUTOF10: 1

## 2024-09-24 ASSESSMENT — PAIN DESCRIPTION - DESCRIPTORS: DESCRIPTORS: ACHING;DULL;DISCOMFORT;SORE

## 2024-09-24 NOTE — PROGRESS NOTES
Physical Therapy  Physical Therapy Treatment Note    Patient Name: Maribell Rosales  MRN: [id number]  Today's Date: 9/24/2024  Time Calculation  Start Time: 1304  Stop Time: 1419  Time Calculation (min): 75 min    Insurance:  Visit number: 12 of 20  Authorization info: hard limit of 20  Insurance Type: [facility]    General:  Reason for visit: s/p right ankle arthroscopy with extensive debridement and syndesmotic stabilization from 08/01/2024   Referred by: Dr. Washington      Current Problem:  1. Acute right ankle pain  Follow Up In Physical Therapy      2. Ankle syndesmosis disruption, right, initial encounter  Follow Up In Physical Therapy        Procedure: s/p right ankle arthroscopy with extensive debridement and syndesmotic stabilization from 08/01/2024   Precautions: Able to wean from 2 crutches to 1 crutch to no crutches and wean from walking boot into lace up ASO brace. No inversion and eversion         Medical History Form: Reviewed (scanned into chart)    Subjective:     Visit #12: She is exactly 8 weeks post op at this point. She states she was really sore after last session until about Sunday, has not been able to walk around much without the brace.  She states she had a lot of soreness in and around the ankle/calf, describes it as a dull/achy. She is back to baseline today, also feeling less on the lateral aspect of the calf.    Pain:  3/10 around the ankle    Red Flags: Do you have any of the following? No  Fever/chills, unexplained weight changes, dizziness/fainting, unexplained change in bowel or bladder functions, unexplained malaise or muscle weakness, night pain/sweats, numbness or tingling    Objective:  Objective     Wells DVT Criteria:  Active Cancer (1):  no  Immobility >3 days or major surgery <4 weeks (1): yes  Calf swelling >3 cm compared with other calf (1): no  collateral (non varicose) superficial veins present (1): no  entire leg swollen (1): no  localized tenderness along deep venous system (1):  "no  pitting edema, greater in symptomatic leg (1): no  paralysis, paresis, or recent plaster immobilization of the lower extremity (1): no  previously documented DVT (1): no  alternative diagnosis to DVT as likely or more likely (-2): yes  -2-0: low risk   1-2 moderate risk  >3 high risk      Observation: Incisions healing well, sutures have been removed, no signs of infection      ROM          Ankle AROM (Degrees)      (R)  (L)  Plantarflexion: 68  70    Dorsiflexion: 10  10    Inversion: 20  45     Eversion: 10  15         Ankle PROM (Degrees)      (R)  (L)  Plantarflexion: 55       Dorsiflexion: 5      Inversion:        Eversion:                Strength Testing    Ankle    (R)  (L)  Plantarflexion: 3  5      Dorsiflexion: 3  5    Inversion: nt  5     Eversion: nt  5         Palpation: no pain with palpation, slight reduced sensation over surgical incisions      Ankle/Foot Joint Mobility: hypomobile R talocrural joint      Gait: ambulates in walking boot, no crutches    Effusion: 48.5 cm (figure 8)      Outcome Measures:          EDUCATION: Home exercise program, plan of care, activity modifications, pain management, and injury pathology       Goals: Set and discussed today  Active       PT Problem       Increase R ankle AROM to 15-20 degrees DF, 60-70 degrees PF and 20 degrees EV.        Start:  03/28/24    Expected End:  05/27/24            Increase R ankle MMT to 5/5 throughout and improve R single heel raise to at least 4\".       Start:  03/28/24    Expected End:  05/27/24            Increase length in R gastrocs to 10 degrees.       Start:  03/28/24    Expected End:  05/27/24            Pt will amb with proper HS/TO pattern and equal stance time/step length to restore normal gait pattern.       Start:  03/28/24    Expected End:  05/27/24            Pt will maintain R SLS for 30 seconds on firm surface without LOB.       Start:  03/28/24    Expected End:  05/27/24            Pt will report 0/10 R ankle pain to " improve prolonged standing and walking, and allow return to exercise classes.       Start:  03/28/24    Expected End:  05/27/24            Improve LEFS by at least 9 points (MDIC).       Start:  03/28/24    Expected End:  05/27/24                    Plan of care was developed with input and agreement by the patient    Treatment Performed:      Therapeutic Exercise:    50 min    Bike 5 minutes  Wedge stretch for DF/PF 3x30 sec  Side steps/monster walks orange band 2x10  Seated Baps board level 3 DF/PF  Calf stretch with strap (gentle) 5x15 sec hold  Ankle PF with green t band 3x12  Seated ankle PF 3x10  Simulating driving with pushing on gas, switching to brake 5x   CKC ankle DF mobility at wall 20x (improved to approx 1 inch from wall today)  SL press jump  level 1 3x12  DL heel raise jump  level 1 3x10  Heel elevated body weight squat 2x10 (mirror for feedback)      Manual Therapy:    10 min  Gentle PF/DF PROM  STM to right peroneals  R ankle PF PROM    Vaso to R ankle med compression, 15 minutes      Assessment: Maribell Rosales is progressing towards their goals as evidenced by compliance with HEP, reporting some soreness but overall it has resolved. Still ambulating primarily in lace up brace.  Today's treatment was progressed by increasing resistance for ankle PF/DF with theraband as well as continued emphasis on restoring ROM and muscle activation. She demonstrates ability to perform active ankle inversion/eversion without pain. She is close to 8 weeks post op and will incorporate more inversion/eversion at next session. I feel she can start to drive in predictable areas without high traffic or having to go with high speeds. Will ultimately need clearance from surgeon on this.  Manual/Verbal/Tactile cues provided during gait training to ensure heel strike during initial contact and push off during toe off phase. No adverse effects from today's session.  Patient would continue to benefit from  skilled PT to address remaining functional, objective and subjective deficits to allow them to return to full independence with ADLs and iADLs.         Clinical Presentation: Stable and/or uncomplicated characteristics    Plan:     Planned Interventions include: therapeutic exercise, self-care home management, manual therapy, therapeutic activities, gait training, neuromuscular coordination, vasopneumatic, dry needling, aquatic therapy  Frequency: 1-2 x Week  Duration: 12 Weeks  Rehab Potential/Prognosis: Good      Alana May, ATC      Laine Clay, PT

## 2024-09-25 NOTE — PROGRESS NOTES
ORTHOPAEDIC SURGERY PROGRESS NOTE    Chief Complaint:  Right ankle pain    History Of Present Illness  Maribell Rosales is a 19 y.o. female who presents for evaluation of right ankle pain.  Patient reports the atraumatic onset of right ankle pain while walking approximately 6 weeks ago.  Patient denies specific injury or change in her activity or shoewear.  Pain is made worse with walking and standing.  She has never had a pain like this in the past.  She has tried dry needling as well as over-the-counter bracing and limiting her activity with persistent pain.  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  This has been difficult for her as well is working out.  She denies any associated numbness, tingling or weakness.    04/25/2024: Patient returns for follow-up of her right ankle injury.  She has been in physical therapy and noting improvements with respect to her instability.  She reports that overall she is improving but continues with moderate pain in her ankle rated as 5 out of 10.  She has not had any recurrent brendon instability events.    06/11/2024: Patient returns for follow-up of her right ankle injury.  Patient continues with 8 out of 10 pain in her right ankle.  She is present with her mom today.  She reports several days of near complete pain relief following previous right ankle corticosteroid injection.  Unfortunately, she has continued with relatively severe and debilitating pain in her ankle.  She does notice a radiating pain that goes into her foot, this is reportedly about 30% of her overall pain.  Her more typical pain is on the outside of her ankle.  She has been unable to return to running due to pain.  She is obviously quite frustrated by this process.    08/14/2024: Patient returns s/p right ankle arthroscopy with extensive debridement and syndesmotic stabilization from 08/01/2024.  Patient is currently reporting 2 out of 10 pain that is gradually improving.  She  reports that the majority of her preoperative pain has actually resolved.  Patient has been compliant with nonweightbearing restrictions in a short leg fiberglass splint.  She is present with her mother today.    08/27/2024: Patient returns s/p right ankle arthroscopy with extensive debridement and syndesmotic stabilization from 08/01/2024.  Patient is currently reporting 2 out of 10 pain that is gradually improving.  She continues to note significant improvement from her preoperative state.  She has been ambulating with use of crutches and continued on aspirin.  She reports that pain at this point feels surgical.  She is present with her mother today.     09/20/2024: Patient returns s/p right ankle arthroscopy with extensive debridement and syndesmotic stabilization from 08/01/2024.  Patient has been in physical pain that is gradually proving.  She has not had any recurrence of events.  She has continued in physical therapy and is utilizing a lace up style ankle brace.  She has begun training with the Jab[employer] ESPINOZA.  She denies any new trauma or swelling.    Past Medical History  Past Medical History:   Diagnosis Date    Allergy to milk products     Anemia     GERD (gastroesophageal reflux disease)     Unspecified asthma, uncomplicated ([facility])     Childhood asthma - stable       Surgical History  Recent Surgeries in Orthopaedic Surgery            No cases to display             Social History  Social History     Socioeconomic History    Marital status: Single   Tobacco Use    Smoking status: Never     Passive exposure: Never    Smokeless tobacco: Never   Vaping Use    Vaping status: Never Used   Substance and Sexual Activity    Alcohol use: Never    Drug use: Never    Sexual activity: Defer       Family History  Family History   Problem Relation Name Age of Onset    No Known Problems Mother      No Known Problems Father      No Known Problems Brother          Allergies  Allergies   Allergen Reactions    Milk Containing  Products (Dairy) Diarrhea       Review of Systems  REVIEW OF SYSTEMS  Constitutional: no unplanned weight loss  Psychiatric: no suicidal ideation  ENT: no vision changes, no sinus problems  Pulmonary: no shortness of breath  Lymphatic: no enlarged lymph nodes  Cardiovascular: no chest pain or shortness of breath  Gastrointestinal: no stomach problems  Genitourinary: no dysuria   Skin: no rashes  Endocrine: no thyroid problems  Neurological: no headache, no numbness  Hematological: no easy bruising  Musculoskeletal: Right ankle pain    Physical Exam  PHYSICAL EXAMINATION  Constitutional Exam: well developed and well nourished  Psychiatric Exam: alert and oriented, appropriate mood and behavior  Eye Exam: EOMI  Pulmonary Exam: breathing non-labored, no apparent distress  Lymphatic exam: no appreciable lymphadenopathy in the lower extremities  Cardiovascular exam: RRR to peripheral palpation, DP pulses 2+, PT 2+, toes are pink with good capillary refill, no pitting edema  Skin exam: no open lesions, rashes, abrasions or ulcerations  Neurological exam: sensation to light touch intact in both lower extremities in peripheral and dermatomal distributions (except for any abnormalities noted in musculoskeletal exam)    Musculoskeletal exam: Right lower extremity examination.  Patient ankle incisions well-healed and well-appearing.  She has notable improvement in her edema overall.  Patient has no significant pain with ankle short arcs of motion, I am able to passively range her ankle to neutral dorsiflexion. She has sensation intact light touch grossly in a saphenous, sural, superficial peroneal, deep peroneal and tibial nerve distribution.  She has 5 out of 5 strength in plantarflexion, dorsiflexion and EHL.  She is 2+ DP/PT pulse palpated.  Patient has a negative anterior drawer, negative talar tilt, negative dorsiflexion external rotation stress test.    Last Recorded Vitals  There were no vitals taken for this  visit.    Laboratory Results    No results found for this or any previous visit (from the past 24 hour(s)).    Radiology Results   X-ray imaging 3 view weightbearing right ankle reviewed from 09/20/2024 and independently evaluated by me demonstrates well aligned ankle mortise with stable appearance of diversion flexible syndesmotic fixation with lateral fibular plate and medial cortical button construct.  There is no sariah-implant fracture, lucency or loosening.    Assessment/Plan:  19-year-old female s/p right ankle arthroscopy with extensive debridement and syndesmotic stabilization from 08/01/2024 who presents with both clinical and radiographic evidence of healing.  I will recommend the patient continue weightbearing to tolerance in the right lower extremity utilizing she may discontinue aspirin at this time.  She should continue with physical therapy with care to avoid higher impact activity.  I will plan to see the patient back in approximately 6 weeks for repeat clinical and radiographic evaluation.  Upon return, patient required three-view weightbearing right ankle.    Antwon Washington MD, BINTA  Department of Orthopaedic Surgery  [facility]

## 2024-09-26 ENCOUNTER — TREATMENT (OUTPATIENT)
Dept: PHYSICAL THERAPY | Facility: HOSPITAL | Age: 19
End: 2024-09-26
Payer: COMMERCIAL

## 2024-09-26 DIAGNOSIS — S93.431A ANKLE SYNDESMOSIS DISRUPTION, RIGHT, INITIAL ENCOUNTER: ICD-10-CM

## 2024-09-26 DIAGNOSIS — M25.571 ACUTE RIGHT ANKLE PAIN: Primary | ICD-10-CM

## 2024-09-26 PROCEDURE — 97140 MANUAL THERAPY 1/> REGIONS: CPT | Mod: GP

## 2024-09-26 PROCEDURE — 97110 THERAPEUTIC EXERCISES: CPT | Mod: GP

## 2024-09-26 PROCEDURE — 97016 VASOPNEUMATIC DEVICE THERAPY: CPT | Mod: GP

## 2024-09-26 NOTE — PROGRESS NOTES
Physical Therapy  Physical Therapy Treatment Note    Patient Name: Maribell Rosales  MRN: [id number]  Today's Date: 9/26/2024  Time Calculation  Start Time: 1102  Stop Time: 1213  Time Calculation (min): 71 min    Insurance:  Visit number: 13 of 20  Authorization info: hard limit of 20  Insurance Type: [facility]    General:  Reason for visit: s/p right ankle arthroscopy with extensive debridement and syndesmotic stabilization from 08/01/2024   Referred by: Dr. Washington      Current Problem:  1. Acute right ankle pain  Follow Up In Physical Therapy      2. Ankle syndesmosis disruption, right, initial encounter  Follow Up In Physical Therapy        Procedure: s/p right ankle arthroscopy with extensive debridement and syndesmotic stabilization from 08/01/2024   Precautions: Able to wean from 2 crutches to 1 crutch to no crutches and wean from walking boot into lace up ASO brace. No inversion and eversion         Medical History Form: Reviewed (scanned into chart)    Subjective:     Visit #13: Patient states she is feeling a lot better since last session, less sore. She had a good follow up with surgeon, was told she could drive when wearing her lace up brace. She states she drove today, and has some tightness in the calf but otherwise feels good.      Pain:  2/10 around the ankle    Red Flags: Do you have any of the following? No  Fever/chills, unexplained weight changes, dizziness/fainting, unexplained change in bowel or bladder functions, unexplained malaise or muscle weakness, night pain/sweats, numbness or tingling    Objective:  Objective     Wells DVT Criteria:  Active Cancer (1):  no  Immobility >3 days or major surgery <4 weeks (1): yes  Calf swelling >3 cm compared with other calf (1): no  collateral (non varicose) superficial veins present (1): no  entire leg swollen (1): no  localized tenderness along deep venous system (1): no  pitting edema, greater in symptomatic leg (1): no  paralysis, paresis, or recent plaster  "immobilization of the lower extremity (1): no  previously documented DVT (1): no  alternative diagnosis to DVT as likely or more likely (-2): yes  -2-0: low risk   1-2 moderate risk  >3 high risk      Observation: Incisions healing well, sutures have been removed, no signs of infection      ROM          Ankle AROM (Degrees)      (R)  (L)  Plantarflexion: 68  70    Dorsiflexion: 10  10    Inversion: 30  45     Eversion: 10  15         Ankle PROM (Degrees)      (R)  (L)  Plantarflexion: 55       Dorsiflexion: 5      Inversion:        Eversion:                Strength Testing    Ankle    (R)  (L)  Plantarflexion: 3  5      Dorsiflexion: 3  5    Inversion: nt  5     Eversion: nt  5         Palpation: no pain with palpation, slight reduced sensation over surgical incisions      Ankle/Foot Joint Mobility: hypomobile R talocrural joint    CKC DF  R: 4 inches  L: 6 inches      Effusion: 48.5 cm (figure 8)      Outcome Measures:          EDUCATION: Home exercise program, plan of care, activity modifications, pain management, and injury pathology       Goals: Set and discussed today  Active       PT Problem       Increase R ankle AROM to 15-20 degrees DF, 60-70 degrees PF and 20 degrees EV.        Start:  03/28/24    Expected End:  05/27/24            Increase R ankle MMT to 5/5 throughout and improve R single heel raise to at least 4\".       Start:  03/28/24    Expected End:  05/27/24            Increase length in R gastrocs to 10 degrees.       Start:  03/28/24    Expected End:  05/27/24            Pt will amb with proper HS/TO pattern and equal stance time/step length to restore normal gait pattern.       Start:  03/28/24    Expected End:  05/27/24            Pt will maintain R SLS for 30 seconds on firm surface without LOB.       Start:  03/28/24    Expected End:  05/27/24            Pt will report 0/10 R ankle pain to improve prolonged standing and walking, and allow return to exercise classes.       Start:  03/28/24    " Expected End:  05/27/24            Improve LEFS by at least 9 points (MDIC).       Start:  03/28/24    Expected End:  05/27/24                      Plan of care was developed with input and agreement by the patient    Treatment Performed:      Therapeutic Exercise:    50 min    Bike 5 minutes  Wedge stretch for DF/PF 3x30 sec  Side steps/monster walks orange band 2x10 around toebox  Manual resistance con/ecc dorsiflexion 10x  Seated Baps board level 3 DF/PF, inv/ev  Calf stretch with strap (gentle) 5x15 sec hold  Long sitting ankle eversion with RTB 20x  Seated ankle heel raise with 10# weight 3x10  CKC ankle DF mobility at wall 20x (improved to approx 1 inch from wall today)  SL balance with cervical rotation 10x each direction  DL press jump  level 5 3x10        Manual Therapy:    10 min  Gentle PF/DF PROM  STM to right peroneals  R ankle PF PROM    Vaso to R ankle med compression, 15 minutes      Assessment: Maribell Rosales is progressing towards their goals as evidenced by compliance with HEP, reporting some soreness but overall it has resolved. Still ambulating primarily in lace up brace. Able to drive to PT today  Today's treatment was progressed by increasing resistance for ankle PF/DF with theraband as well as continued emphasis on restoring ROM and muscle activation. Added dynamic SL balance and DL press today as well- her CKC DK continues to improve.  Manual/Verbal/Tactile cues provided during gait training to ensure heel strike during initial contact and push off during toe off phase. No adverse effects from today's session.  Patient would continue to benefit from skilled PT to address remaining functional, objective and subjective deficits to allow them to return to full independence with ADLs and iADLs.         Clinical Presentation: Stable and/or uncomplicated characteristics    Plan:     Planned Interventions include: therapeutic exercise, self-care home management, manual therapy, therapeutic  activities, gait training, neuromuscular coordination, vasopneumatic, dry needling, aquatic therapy  Frequency: 1-2 x Week  Duration: 12 Weeks  Rehab Potential/Prognosis: Good        Laine Clay, PT

## 2024-10-01 ENCOUNTER — TREATMENT (OUTPATIENT)
Dept: PHYSICAL THERAPY | Facility: HOSPITAL | Age: 19
End: 2024-10-01
Payer: COMMERCIAL

## 2024-10-01 ENCOUNTER — APPOINTMENT (OUTPATIENT)
Dept: INTEGRATIVE MEDICINE | Facility: CLINIC | Age: 19
End: 2024-10-01
Payer: COMMERCIAL

## 2024-10-01 DIAGNOSIS — M25.571 ACUTE RIGHT ANKLE PAIN: Primary | ICD-10-CM

## 2024-10-01 DIAGNOSIS — S93.431A ANKLE SYNDESMOSIS DISRUPTION, RIGHT, INITIAL ENCOUNTER: ICD-10-CM

## 2024-10-01 PROCEDURE — 97016 VASOPNEUMATIC DEVICE THERAPY: CPT | Mod: GP

## 2024-10-01 PROCEDURE — 97110 THERAPEUTIC EXERCISES: CPT | Mod: GP

## 2024-10-01 NOTE — PROGRESS NOTES
Physical Therapy  Physical Therapy Treatment Note    Patient Name: Maribell Rosales  MRN: [id number]  Today's Date: 10/1/2024  Time Calculation  Start Time: 1100  Stop Time: 1205  Time Calculation (min): 65 min    Insurance:  Visit number: 14 of 20  Authorization info: hard limit of 20  Insurance Type: [facility]    General:  Reason for visit: s/p right ankle arthroscopy with extensive debridement and syndesmotic stabilization from 08/01/2024   Referred by: Dr. Washington      Current Problem:  1. Acute right ankle pain  Follow Up In Physical Therapy      2. Ankle syndesmosis disruption, right, initial encounter  Follow Up In Physical Therapy        Procedure: s/p right ankle arthroscopy with extensive debridement and syndesmotic stabilization from 08/01/2024   Precautions: Able to wean from 2 crutches to 1 crutch to no crutches and wean from walking boot into lace up ASO brace. No inversion and eversion         Medical History Form: Reviewed (scanned into chart)    Subjective:     Visit #14:  Patient states she is driving now and able to drive with confidence, states she is able to walk around at home without her brace but still wears it out in public.  She states the soreness she typically has is better today.    Pain:  2/10 around the ankle    Red Flags: Do you have any of the following? No  Fever/chills, unexplained weight changes, dizziness/fainting, unexplained change in bowel or bladder functions, unexplained malaise or muscle weakness, night pain/sweats, numbness or tingling    Objective:  Objective     Wells DVT Criteria:  Active Cancer (1):  no  Immobility >3 days or major surgery <4 weeks (1): yes  Calf swelling >3 cm compared with other calf (1): no  collateral (non varicose) superficial veins present (1): no  entire leg swollen (1): no  localized tenderness along deep venous system (1): no  pitting edema, greater in symptomatic leg (1): no  paralysis, paresis, or recent plaster immobilization of the lower extremity  "(1): no  previously documented DVT (1): no  alternative diagnosis to DVT as likely or more likely (-2): yes  -2-0: low risk   1-2 moderate risk  >3 high risk      Observation: Incisions healing well, sutures have been removed, no signs of infection      ROM          Ankle AROM (Degrees)      (R)  (L)  Plantarflexion: 70  70    Dorsiflexion: 10  10    Inversion: 30  45     Eversion: 12  15         Ankle PROM (Degrees)      (R)  (L)  Plantarflexion: 55       Dorsiflexion: 5      Inversion:        Eversion:                Strength Testing    Ankle    (R)  (L)  Plantarflexion: 3  5      Dorsiflexion: 3  5    Inversion: nt  5     Eversion: nt  5         Palpation: no pain with palpation, slight reduced sensation over surgical incisions      Ankle/Foot Joint Mobility: hypomobile R talocrural joint    CKC DF  R: 4 inches  L: 6 inches      Effusion: 48.5 cm (figure 8)      Outcome Measures:          EDUCATION: Home exercise program, plan of care, activity modifications, pain management, and injury pathology       Goals: Set and discussed today  Active       PT Problem       Increase R ankle AROM to 15-20 degrees DF, 60-70 degrees PF and 20 degrees EV.        Start:  03/28/24    Expected End:  05/27/24            Increase R ankle MMT to 5/5 throughout and improve R single heel raise to at least 4\".       Start:  03/28/24    Expected End:  05/27/24            Increase length in R gastrocs to 10 degrees.       Start:  03/28/24    Expected End:  05/27/24            Pt will amb with proper HS/TO pattern and equal stance time/step length to restore normal gait pattern.       Start:  03/28/24    Expected End:  05/27/24            Pt will maintain R SLS for 30 seconds on firm surface without LOB.       Start:  03/28/24    Expected End:  05/27/24            Pt will report 0/10 R ankle pain to improve prolonged standing and walking, and allow return to exercise classes.       Start:  03/28/24    Expected End:  05/27/24            " Improve LEFS by at least 9 points (MDIC).       Start:  03/28/24    Expected End:  05/27/24                  Plan of care was developed with input and agreement by the patient    Treatment Performed:      Therapeutic Exercise:    50 min    Bike 5 minutes  Wedge stretch for DF/PF 3x30 sec  Side steps/monster walks red band 2x10 around toebox  Single leg fire hydrant 3x10 each leg  SL balance on airex pad 3x30 sec each  Seated Baps board level 3 DF/PF, inv/ev  Long sitting ankle eversion/inversion with RTB 20x  Bent knee heel raises 3x10  CKC ankle DF mobility at wall 20x (improved to approx 3 inch from wall today)  DL press jump  level 5 3x10  SL forward step up on airex pad 30x  SL lateral forward step up airex pad 30x  SL balance with rebounder toss/catch 4# med ball 30x each        Manual Therapy:    0 min  Gentle PF/DF PROM  STM to right peroneals  R ankle PF PROM    Vaso to R ankle med compression, 15 minutes      Assessment: Maribell Rosales is progressing towards their goals as evidenced by compliance with HEP, reporting some soreness but overall it has resolved. Still ambulating primarily in lace up brace but reports ability to not wear it at home, wants to wean out of this. Denies pain coming into today's session.  Today's treatment was progressed by adding DL heel raise with gastroc/soleus bias as well as adding more single leg balance on stable and unstable surfaces. She was challenged by step up on unstable surface but able to maintain balance with this. Discussed incorporating DL heel raises at home with knee bent and knee straight.  Manual/Verbal/Tactile cues provided during all exercises to ensure proper form and mechanics and to avoid compensatory movement patterns. Overall she is progressing well- will continue to challenge strength and stability.  Patient would continue to benefit from skilled PT to address remaining functional, objective and subjective deficits to allow them to return to full  independence with ADLs and iADLs.       Clinical Presentation: Stable and/or uncomplicated characteristics    Plan:     Planned Interventions include: therapeutic exercise, self-care home management, manual therapy, therapeutic activities, gait training, neuromuscular coordination, vasopneumatic, dry needling, aquatic therapy  Frequency: 1-2 x Week  Duration: 12 Weeks  Rehab Potential/Prognosis: Good        Laine Clay, PT

## 2024-10-06 NOTE — PROGRESS NOTES
Physical Therapy  Physical Therapy Treatment Note    Patient Name: Maribell Rosales  MRN: [id number]  Today's Date: 10/7/2024  Time Calculation  Start Time: 1057  Stop Time: 1207  Time Calculation (min): 70 min    Insurance:  Visit number: 15 of 20  Authorization info: hard limit of 20  Insurance Type: [facility]    General:  Reason for visit: s/p right ankle arthroscopy with extensive debridement and syndesmotic stabilization from 08/01/2024   Referred by: Dr. Washington      Current Problem:  1. Acute right ankle pain  Follow Up In Physical Therapy      2. Ankle syndesmosis disruption, right, initial encounter  Follow Up In Physical Therapy        Procedure: s/p right ankle arthroscopy with extensive debridement and syndesmotic stabilization from 08/01/2024          Medical History Form: Reviewed (scanned into chart)    Subjective:     Visit #15/20: Patient is just over 9 weeks post op, states soreness in the calf and side of ankle today and yesterday but reports thinks this is due to increased standing and walking time and spending less time in the brace.      Pain:  2/10 around the ankle    Red Flags: Do you have any of the following? No  Fever/chills, unexplained weight changes, dizziness/fainting, unexplained change in bowel or bladder functions, unexplained malaise or muscle weakness, night pain/sweats, numbness or tingling    Objective:  Objective     Wells DVT Criteria:  Active Cancer (1):  no  Immobility >3 days or major surgery <4 weeks (1): yes  Calf swelling >3 cm compared with other calf (1): no  collateral (non varicose) superficial veins present (1): no  entire leg swollen (1): no  localized tenderness along deep venous system (1): no  pitting edema, greater in symptomatic leg (1): no  paralysis, paresis, or recent plaster immobilization of the lower extremity (1): no  previously documented DVT (1): no  alternative diagnosis to DVT as likely or more likely (-2): yes  -2-0: low risk   1-2 moderate risk  >3 high  "risk      Observation: Incisions healing well, sutures have been removed, no signs of infection      ROM          Ankle AROM (Degrees)      (R)  (L)  Plantarflexion: 70  70    Dorsiflexion: 10  10    Inversion: 30  45     Eversion: 12  15         Ankle PROM (Degrees)      (R)  (L)  Plantarflexion: 55       Dorsiflexion: 5      Inversion:        Eversion:                Strength Testing    Ankle    (R)  (L)  Plantarflexion: 3  5      Dorsiflexion: 3  5    Inversion: nt  5     Eversion: nt  5         Palpation: no pain with palpation, slight reduced sensation over surgical incisions      Ankle/Foot Joint Mobility: hypomobile R talocrural joint    CKC DF  R: 4 inches  L: 6 inches      Effusion: 48.5 cm (figure 8)      Outcome Measures:          EDUCATION: Home exercise program, plan of care, activity modifications, pain management, and injury pathology       Goals: Set and discussed today  Active       PT Problem       Increase R ankle AROM to 15-20 degrees DF, 60-70 degrees PF and 20 degrees EV.        Start:  03/28/24    Expected End:  05/27/24            Increase R ankle MMT to 5/5 throughout and improve R single heel raise to at least 4\".       Start:  03/28/24    Expected End:  05/27/24            Increase length in R gastrocs to 10 degrees.       Start:  03/28/24    Expected End:  05/27/24            Pt will amb with proper HS/TO pattern and equal stance time/step length to restore normal gait pattern.       Start:  03/28/24    Expected End:  05/27/24            Pt will maintain R SLS for 30 seconds on firm surface without LOB.       Start:  03/28/24    Expected End:  05/27/24            Pt will report 0/10 R ankle pain to improve prolonged standing and walking, and allow return to exercise classes.       Start:  03/28/24    Expected End:  05/27/24            Improve LEFS by at least 9 points (MDIC).       Start:  03/28/24    Expected End:  05/27/24                Plan of care was developed with input and " agreement by the patient    Treatment Performed:      Therapeutic Exercise:    55 min    Bike 5 minutes  Wedge stretch for DF/PF 3x30 sec  Side steps/monster walks red band 2x10 around toebox  Single leg fire hydrant 3x10 each leg  Foam rolling for calves 20x  SL balance anti inv/ev with paloff press 2x10 each blue band  DL heel raise on jump  level 1- 3x10  Long sitting ankle PF through full ROM blue 3x12  Long sitting ankle eversion 3x12 RTB  Wall sit heel raise 3x10 (emphasis on full ROM)    CKC ankle DF mobility at wall 20x (improved to approx 3 inch from wall today)  DL press jump  level 5 3x10  SL forward step up on airex pad 20x  SL lateral forward step up airex pad 20x  SL balance with rebounder toss/catch 4# med ball 30x each      Vaso to R ankle med compression, 15 minutes      Assessment  Maribell Rosales is progressing towards their goals as evidenced by compliance with HEP, improving standing and walking tolerance.   Today's treatment was progressed by adding more single leg balance and plantar flexion strengthening exercises. She struggles with end range plantar flexion strength as well as single leg balance when inversion and eversion are challenged. Not able to perform BW heel raise on deficit, able to perform wall sit heel raise though. Continue to stress importance of resistance band end range PF strengthening at home and emphasis on full ROM with this.   Manual/Verbal/Tactile cues provided during all exercises to ensure proper form and mechanics and to avoid compensatory movement patterns.  Patient would continue to benefit from skilled PT to address remaining functional, objective and subjective deficits to allow them to return to full independence with ADLs and iADLs.         Clinical Presentation: Stable and/or uncomplicated characteristics    Plan:     Planned Interventions include: therapeutic exercise, self-care home management, manual therapy, therapeutic activities, gait  training, neuromuscular coordination, vasopneumatic, dry needling, aquatic therapy  Frequency: 1-2 x Week  Duration: 12 Weeks  Rehab Potential/Prognosis: Good        Laine Clay, PT

## 2024-10-07 ENCOUNTER — TREATMENT (OUTPATIENT)
Dept: PHYSICAL THERAPY | Facility: HOSPITAL | Age: 19
End: 2024-10-07
Payer: COMMERCIAL

## 2024-10-07 DIAGNOSIS — M25.571 ACUTE RIGHT ANKLE PAIN: Primary | ICD-10-CM

## 2024-10-07 DIAGNOSIS — S93.431A ANKLE SYNDESMOSIS DISRUPTION, RIGHT, INITIAL ENCOUNTER: ICD-10-CM

## 2024-10-07 PROCEDURE — 97016 VASOPNEUMATIC DEVICE THERAPY: CPT | Mod: GP

## 2024-10-07 PROCEDURE — 97110 THERAPEUTIC EXERCISES: CPT | Mod: GP

## 2024-10-09 ENCOUNTER — APPOINTMENT (OUTPATIENT)
Dept: PHYSICAL THERAPY | Facility: HOSPITAL | Age: 19
End: 2024-10-09
Payer: COMMERCIAL

## 2024-10-15 ENCOUNTER — TREATMENT (OUTPATIENT)
Dept: PHYSICAL THERAPY | Facility: HOSPITAL | Age: 19
End: 2024-10-15
Payer: COMMERCIAL

## 2024-10-15 DIAGNOSIS — M25.571 ACUTE RIGHT ANKLE PAIN: ICD-10-CM

## 2024-10-15 DIAGNOSIS — S93.431A ANKLE SYNDESMOSIS DISRUPTION, RIGHT, INITIAL ENCOUNTER: ICD-10-CM

## 2024-10-15 PROCEDURE — 97110 THERAPEUTIC EXERCISES: CPT | Mod: GP

## 2024-10-15 PROCEDURE — 97140 MANUAL THERAPY 1/> REGIONS: CPT | Mod: GP

## 2024-10-15 PROCEDURE — 97016 VASOPNEUMATIC DEVICE THERAPY: CPT | Mod: GP

## 2024-10-15 NOTE — PROGRESS NOTES
Physical Therapy  Physical Therapy Treatment Note    Patient Name: Maribell Rosales  MRN: [id number]  Today's Date: 10/15/2024  Time Calculation  Start Time: 1101  Stop Time: 1201  Time Calculation (min): 60 min    Insurance:  Visit number: 16 of 20  Authorization info: hard limit of 20  Insurance Type: [facility]    General:  Reason for visit: s/p right ankle arthroscopy with extensive debridement and syndesmotic stabilization from 08/01/2024   Referred by: Dr. Washington      Current Problem:  1. Acute right ankle pain  Follow Up In Physical Therapy      2. Ankle syndesmosis disruption, right, initial encounter  Follow Up In Physical Therapy        Procedure: s/p right ankle arthroscopy with extensive debridement and syndesmotic stabilization from 08/01/2024          Medical History Form: Reviewed (scanned into chart)    Subjective:     Visit #16/20: Patient is just over 10 weeks post op, she states after our session she felt good, thinks she may have overdone it a lot with more standing, walking at work. She also did barre/yoga as well as 30 minute lifting.  She states most of the soreness hit her Thursday through Sunday, states it felt better yesterday but this morning after driving here it is feeling weird/sharp pain along the lateral aspect of the R ankle.      Pain:  4/10 around the ankle    Red Flags: Do you have any of the following? No  Fever/chills, unexplained weight changes, dizziness/fainting, unexplained change in bowel or bladder functions, unexplained malaise or muscle weakness, night pain/sweats, numbness or tingling    Objective:  Objective     Wells DVT Criteria:  Active Cancer (1):  no  Immobility >3 days or major surgery <4 weeks (1): yes  Calf swelling >3 cm compared with other calf (1): no  collateral (non varicose) superficial veins present (1): no  entire leg swollen (1): no  localized tenderness along deep venous system (1): no  pitting edema, greater in symptomatic leg (1): no  paralysis, paresis, or  "recent plaster immobilization of the lower extremity (1): no  previously documented DVT (1): no  alternative diagnosis to DVT as likely or more likely (-2): yes  -2-0: low risk   1-2 moderate risk  >3 high risk      Observation: Incisions healing well, sutures have been removed, no signs of infection      ROM          Ankle AROM (Degrees)      (R)  (L)  Plantarflexion: 70  70    Dorsiflexion: 10  10    Inversion: 30  45     Eversion: 12  15         Ankle PROM (Degrees)      (R)  (L)  Plantarflexion: 55       Dorsiflexion: 5      Inversion:        Eversion:                Strength Testing    Ankle    (R)  (L)  Plantarflexion: 3  5      Dorsiflexion: 3  5    Inversion: nt  5     Eversion: nt  5         Palpation: no pain with palpation, slight reduced sensation over surgical incisions      Ankle/Foot Joint Mobility: hypomobile R talocrural joint    CKC DF  R: 4 inches  L: 6 inches      Effusion: 48.5 cm (figure 8)      Outcome Measures:          EDUCATION: Home exercise program, plan of care, activity modifications, pain management, and injury pathology       Goals: Set and discussed today  Active       PT Problem       Increase R ankle AROM to 15-20 degrees DF, 60-70 degrees PF and 20 degrees EV.        Start:  03/28/24    Expected End:  05/27/24            Increase R ankle MMT to 5/5 throughout and improve R single heel raise to at least 4\".       Start:  03/28/24    Expected End:  05/27/24            Increase length in R gastrocs to 10 degrees.       Start:  03/28/24    Expected End:  05/27/24            Pt will amb with proper HS/TO pattern and equal stance time/step length to restore normal gait pattern.       Start:  03/28/24    Expected End:  05/27/24            Pt will maintain R SLS for 30 seconds on firm surface without LOB.       Start:  03/28/24    Expected End:  05/27/24            Pt will report 0/10 R ankle pain to improve prolonged standing and walking, and allow return to exercise classes.       " Start:  03/28/24    Expected End:  05/27/24            Improve LEFS by at least 9 points (MDIC).       Start:  03/28/24    Expected End:  05/27/24                Plan of care was developed with input and agreement by the patient    Treatment Performed:      Therapeutic Exercise:    30 min    Long sitting calf stretch 3x30 sec  Ankle DF/inv/ev RTB 3x10  Ankle PF green t band 3x10  Sidelying clamshell GTB 3x12 each side  Sidelying hip abduction 3x10 each leg  DL bridge 3x12      Wedge stretch for DF/PF 3x30 sec  Side steps/monster walks red band 2x10 around toebox  Single leg fire hydrant 3x10 each leg  Foam rolling for calves 20x  SL balance anti inv/ev with paloff press 2x10 each blue band  DL heel raise on jump  level 1- 3x10  Long sitting ankle PF through full ROM blue 3x12  Long sitting ankle eversion 3x12 RTB  Wall sit heel raise 3x10 (emphasis on full ROM)    CKC ankle DF mobility at wall 20x (improved to approx 3 inch from wall today)  DL press jump  level 5 3x10  SL forward step up on airex pad 20x  SL lateral forward step up airex pad 20x  SL balance with rebounder toss/catch 4# med ball 30x each    Manual: 15 minutes  Soft tissue mobilization to R calf      Vaso to R ankle med compression, 15 minutes      Assessment  Maribell Rosales is progressing towards their goals as evidenced by compliance with hep and working out, however did do quite a bit last week and feels she has a flare up. Feels more tight today.  Today's treatment was progressed by utilizing soft tissue and passive stretching of the ankle and gentle resisted movements as well as unloaded hip strengthening- adequately challenged by this on her surgical leg compared to non surgical leg.  Manual/Verbal/Tactile cues provided during all exercises to ensure proper form as well as not pushing through pain/soreness outside of PT.  Patient would continue to benefit from skilled PT to address remaining functional, objective and subjective  deficits to allow them to return to full independence with ADLs and iADLs.           Clinical Presentation: Stable and/or uncomplicated characteristics    Plan:     Planned Interventions include: therapeutic exercise, self-care home management, manual therapy, therapeutic activities, gait training, neuromuscular coordination, vasopneumatic, dry needling, aquatic therapy  Frequency: 1-2 x Week  Duration: 12 Weeks  Rehab Potential/Prognosis: Good        Laine Clay, PT

## 2024-10-22 ENCOUNTER — APPOINTMENT (OUTPATIENT)
Dept: INTEGRATIVE MEDICINE | Facility: CLINIC | Age: 19
End: 2024-10-22
Payer: COMMERCIAL

## 2024-10-22 DIAGNOSIS — M99.03 SEGMENTAL AND SOMATIC DYSFUNCTION OF LUMBAR REGION: Primary | ICD-10-CM

## 2024-10-22 DIAGNOSIS — M54.59 POSTURAL LOW BACK PAIN: ICD-10-CM

## 2024-10-22 DIAGNOSIS — M99.04 SEGMENTAL AND SOMATIC DYSFUNCTION OF SACRAL REGION: ICD-10-CM

## 2024-10-22 DIAGNOSIS — M99.01 SEGMENTAL AND SOMATIC DYSFUNCTION OF CERVICAL REGION: ICD-10-CM

## 2024-10-22 DIAGNOSIS — M99.02 SEGMENTAL AND SOMATIC DYSFUNCTION OF THORACIC REGION: ICD-10-CM

## 2024-10-22 DIAGNOSIS — M89.8X1 PERISCAPULAR PAIN: ICD-10-CM

## 2024-10-22 DIAGNOSIS — M79.9 POSTURAL STRAIN: ICD-10-CM

## 2024-10-22 NOTE — PROGRESS NOTES
Subjective   Patient ID: Maribell Rosales is a 19 y.o. female who presents October 22, 2024 for chiropractic care.    NVL - ED:     Today, the patient rates their degree of pain as a 7 out of 10 on the numeric pain rating scale.     Maribell returns for continued chiropractic care. She reports that she has been doing well. She states that her ankle rehabilitation has been going well. However, she feels like she is slightly off since the surgery. The patient denies any changes in health since her last encounter and will follow up as scheduled.   ________________________________________  Initial exam:   Maribell Rosales presents for evaluation and treatment of left sided scapular pain and low back pain. She states that her left sided scapular pain started in mid-January. She denies any traumas, accidents, incidents, or injuries. She states that she has noticed a significant loss of strength in the left arm when compared to the right. She states that she predominately notices this decreased strength and some pain when performing lateral raises with weight. She reports that her secondary complaint of low back pain. She states that these symptoms have been present for years and occur intermittently. She states that standing for extended periods of time will aggravate her symptoms, causing a diffuse bilateral discomfort parallel to her belt line. She denies any radiating symptoms.     Maribell also reports a recent ankle injury. She states that that she was walking and suddenly felt pain along the the medial plantar surface of the foot, along the medial aspect of the achilles tendon and the lateral aspect of the achilles tendon. She states that her range of motion was decreased, she notes pain with palpation and difficulty with weight bearing movements. She denies any swelling or bruising.      Objective   Physical Exam  Neurological:      General: No focal deficit present.      Mental Status: She is alert and oriented to  person, place, and time.      Cranial Nerves: No dysarthria or facial asymmetry.      Sensory: Sensation is intact.      Motor: Motor function is intact.      Coordination: Coordination is intact.      Gait: Gait is intact.       Palpation of the following region(s) revealed:  Cervical: Upper trapezius left, hypertonicity and tenderness.  Levator scapulae left, hypertonicity and tenderness.  Thoracic: Thoracic paraspinals left, hypertonicity and tenderness.  Middle trapezius left, hypertonicity and tenderness.  Rhomboids left, hypertonicity and tenderness.  Latissimus dorsi left, hypertonicity and tenderness.  Lumbar: Lumbar paraspinals bilateral, muscular hypertonicity.    Segmental Joint(s): Segmental joint dysfunction was assessed with motion palpation and is identified in the following areas:  Cervical : C2 C5  Thoracic : T4 and T5  Lumbopelvic / Sacral SIJ : L5/S1 and R SIJ    Assessment/Plan   Today's Treatment Included: Spinal manipulation to the following regions of segmental dysfunction identified on examination, using age-appropriate and injury specific force. Segmental Joint(s) Cervical : C2 C4 C5 Segmental Joint(s) Thoracic : T4, T5, and T8 Segmental Joint(s) Lumbopelvic/Sacral SIJ : L4, L5/S1, R SIJ, and L SIJ  Chiropractic manipulation treatment techniques utilized: Diversified CMT, Pelvic drop table technique, and Pelvic blocking technique  Soft tissue mobilization techniques utilized during treatment: Pin and Stretch, Cupping, and Manual Dynamic Stretching  Neuromuscular Re-Education (05339): 2 Units; Start time: 12:45p  End time: 1:15p      Soft-tissue mobilization was performed in the following areas:   Thoracic Paraspinal mm. bilateral  Lumbar Paraspinal mm. bilateral and Quadratus Lumborum bilateral  Trapezius mm. Upper  and Middle  left, Levator Scap. left, Rhomboids left, Supraspinatus left, Infraspinatus left, and Deltoid mm. left    The patient tolerated today's treatment with little or no  additional discomfort and was instructed to contact the office for questions or concerns.

## 2024-10-29 ENCOUNTER — TREATMENT (OUTPATIENT)
Dept: PHYSICAL THERAPY | Facility: HOSPITAL | Age: 19
End: 2024-10-29
Payer: COMMERCIAL

## 2024-10-29 DIAGNOSIS — S93.431A ANKLE SYNDESMOSIS DISRUPTION, RIGHT, INITIAL ENCOUNTER: ICD-10-CM

## 2024-10-29 DIAGNOSIS — M25.571 ACUTE RIGHT ANKLE PAIN: Primary | ICD-10-CM

## 2024-10-29 PROCEDURE — 97110 THERAPEUTIC EXERCISES: CPT | Mod: GP

## 2024-10-29 PROCEDURE — 97016 VASOPNEUMATIC DEVICE THERAPY: CPT | Mod: GP

## 2024-10-31 ENCOUNTER — APPOINTMENT (OUTPATIENT)
Dept: PHYSICAL THERAPY | Facility: HOSPITAL | Age: 19
End: 2024-10-31
Payer: COMMERCIAL

## 2024-11-07 ENCOUNTER — TREATMENT (OUTPATIENT)
Dept: PHYSICAL THERAPY | Facility: HOSPITAL | Age: 19
End: 2024-11-07
Payer: COMMERCIAL

## 2024-11-07 DIAGNOSIS — M25.571 ACUTE RIGHT ANKLE PAIN: Primary | ICD-10-CM

## 2024-11-07 DIAGNOSIS — S93.431A ANKLE SYNDESMOSIS DISRUPTION, RIGHT, INITIAL ENCOUNTER: ICD-10-CM

## 2024-11-07 PROCEDURE — 97016 VASOPNEUMATIC DEVICE THERAPY: CPT | Mod: GP

## 2024-11-07 PROCEDURE — 97140 MANUAL THERAPY 1/> REGIONS: CPT | Mod: GP

## 2024-11-07 PROCEDURE — 97110 THERAPEUTIC EXERCISES: CPT | Mod: GP

## 2024-11-07 NOTE — PROGRESS NOTES
Physical Therapy  Physical Therapy Treatment Note    Patient Name: Maribell Rosales  MRN: [id number]  Today's Date: 11/7/2024  Time Calculation  Start Time: 1104  Stop Time: 1204  Time Calculation (min): 60 min    Insurance:  Visit number: 18 of 20  Authorization info: hard limit of 20  Insurance Type: [facility]    General:  Reason for visit: s/p right ankle arthroscopy with extensive debridement and syndesmotic stabilization from 08/01/2024   Referred by: Dr. Washington      Current Problem:  1. Acute right ankle pain        2. Ankle syndesmosis disruption, right, initial encounter            Procedure: s/p right ankle arthroscopy with extensive debridement and syndesmotic stabilization from 08/01/2024          Medical History Form: Reviewed (scanned into chart)    Subjective:     Visit #18/20: Maribell states she had a wedding on Tuesday, her brother's wedding, was up on her feet all day and did a lot of standing/jumping at wedding. She states she was in [facility] from Sunday on and was on her feet a lot. She states prior to being in [place] she was doing really well.      Pain:  6/10 around the ankle    Red Flags: Do you have any of the following? No  Fever/chills, unexplained weight changes, dizziness/fainting, unexplained change in bowel or bladder functions, unexplained malaise or muscle weakness, night pain/sweats, numbness or tingling    Objective:  Objective     Wells DVT Criteria:  Active Cancer (1):  no  Immobility >3 days or major surgery <4 weeks (1): yes  Calf swelling >3 cm compared with other calf (1): no  collateral (non varicose) superficial veins present (1): no  entire leg swollen (1): no  localized tenderness along deep venous system (1): no  pitting edema, greater in symptomatic leg (1): no  paralysis, paresis, or recent plaster immobilization of the lower extremity (1): no  previously documented DVT (1): no  alternative diagnosis to DVT as likely or more likely (-2): yes  -2-0: low risk   1-2 moderate risk  >3 high  "risk      Observation: Incisions healing well, sutures have been removed, no signs of infection      ROM          Ankle AROM (Degrees)      (R)  (L)  Plantarflexion: 70  70    Dorsiflexion: 10  10    Inversion: 30  45     Eversion: 12  15         Ankle PROM (Degrees)      (R)  (L)  Plantarflexion: 55       Dorsiflexion: 5      Inversion:        Eversion:                Strength Testing    Ankle    (R)  (L)  Plantarflexion: 3  5      Dorsiflexion: 3  5    Inversion: nt  5     Eversion: nt  5         Palpation: no pain with palpation, slight reduced sensation over surgical incisions      Ankle/Foot Joint Mobility: hypomobile R talocrural joint    CKC DF  R: 4 inches  L: 6 inches      Effusion: 48.5 cm (figure 8)      Outcome Measures:          EDUCATION: Home exercise program, plan of care, activity modifications, pain management, and injury pathology       Goals: Set and discussed today  Active       PT Problem       Increase R ankle AROM to 15-20 degrees DF, 60-70 degrees PF and 20 degrees EV.        Start:  03/28/24    Expected End:  05/27/24            Increase R ankle MMT to 5/5 throughout and improve R single heel raise to at least 4\".       Start:  03/28/24    Expected End:  05/27/24            Increase length in R gastrocs to 10 degrees.       Start:  03/28/24    Expected End:  05/27/24            Pt will amb with proper HS/TO pattern and equal stance time/step length to restore normal gait pattern.       Start:  03/28/24    Expected End:  05/27/24            Pt will maintain R SLS for 30 seconds on firm surface without LOB.       Start:  03/28/24    Expected End:  05/27/24            Pt will report 0/10 R ankle pain to improve prolonged standing and walking, and allow return to exercise classes.       Start:  03/28/24    Expected End:  05/27/24            Improve LEFS by at least 9 points (MDIC).       Start:  03/28/24    Expected End:  05/27/24                Plan of care was developed with input and " agreement by the patient    Treatment Performed:      Therapeutic Exercise:    15 min    Foam rolling quads/ITB/calves  Wedge stretch for DF/PF 3x30 sec  Long sitting calf stretch with strap      Manual Therapy: Soft tissue to calf/peroneals- 30 minutes    Vaso: 15 minutes, high compression, 24 deg        Side steps/monster walks red band 2x10 around toebox  Single leg fire hydrant 3x10 each leg  SL balance on inversion wedge with paloff press 20x each  DL to SL heel raise with wedge 3x12  DL press jump  level 5 1 yc 3x12  SL press jump  level 5 3x10 each  SL balance on airex with ball toss to rebounder 2# 30x  SL balance lateral throw on ground to rebounder 2# 30x  DL heel raise to SL lower 3x10      Vaso to R ankle med compression, 15 minutes      Assessment  Maribell Rosales is progressing towards their goals as evidenced by compliance with HEP, feeling better and more confident in her ankle. Still unable to perform a SL heel raise on R leg but does demo much better eccentric control when going up on 2 and lowering on one. Added this to HEP today- focused a lot on single leg strength and balance. She did well without any complaints of pain- is feeling more confident in her ankle.  No adverse effects to today's session.  Patient would continue to benefit from skilled PT to address remaining functional, objective and subjective deficits to allow them to return to full independence with ADLs and iADLs.           Clinical Presentation: Stable and/or uncomplicated characteristics      Laine Clay, PT

## 2024-11-12 ENCOUNTER — HOSPITAL ENCOUNTER (OUTPATIENT)
Dept: RADIOLOGY | Facility: CLINIC | Age: 19
Discharge: HOME | End: 2024-11-12
Payer: COMMERCIAL

## 2024-11-12 ENCOUNTER — OFFICE VISIT (OUTPATIENT)
Dept: ORTHOPEDIC SURGERY | Facility: CLINIC | Age: 19
End: 2024-11-12
Payer: COMMERCIAL

## 2024-11-12 DIAGNOSIS — S93.431A ANKLE SYNDESMOSIS DISRUPTION, RIGHT, INITIAL ENCOUNTER: ICD-10-CM

## 2024-11-12 PROCEDURE — 73610 X-RAY EXAM OF ANKLE: CPT | Mod: RT

## 2024-11-12 PROCEDURE — 99213 OFFICE O/P EST LOW 20 MIN: CPT | Performed by: STUDENT IN AN ORGANIZED HEALTH CARE EDUCATION/TRAINING PROGRAM

## 2024-11-12 PROCEDURE — 73610 X-RAY EXAM OF ANKLE: CPT | Mod: RIGHT SIDE | Performed by: RADIOLOGY

## 2024-11-12 ASSESSMENT — PAIN - FUNCTIONAL ASSESSMENT: PAIN_FUNCTIONAL_ASSESSMENT: 0-10

## 2024-11-12 ASSESSMENT — PAIN DESCRIPTION - DESCRIPTORS: DESCRIPTORS: DULL

## 2024-11-12 ASSESSMENT — PAIN SCALES - GENERAL: PAINLEVEL_OUTOF10: 0 - NO PAIN

## 2024-11-12 NOTE — PROGRESS NOTES
ORTHOPAEDIC SURGERY PROGRESS NOTE    Chief Complaint:  Right ankle pain    History Of Present Illness  Maribell Rosales is a 19 y.o. female who presents for evaluation of right ankle pain.  Patient reports the atraumatic onset of right ankle pain while walking approximately 6 weeks ago.  Patient denies specific injury or change in her activity or shoewear.  Pain is made worse with walking and standing.  She has never had a pain like this in the past.  She has tried dry needling as well as over-the-counter bracing and limiting her activity with persistent pain.  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  This has been difficult for her as well is working out.  She denies any associated numbness, tingling or weakness.    04/25/2024: Patient returns for follow-up of her right ankle injury.  She has been in physical therapy and noting improvements with respect to her instability.  She reports that overall she is improving but continues with moderate pain in her ankle rated as 5 out of 10.  She has not had any recurrent brendon instability events.    06/11/2024: Patient returns for follow-up of her right ankle injury.  Patient continues with 8 out of 10 pain in her right ankle.  She is present with her mom today.  She reports several days of near complete pain relief following previous right ankle corticosteroid injection.  Unfortunately, she has continued with relatively severe and debilitating pain in her ankle.  She does notice a radiating pain that goes into her foot, this is reportedly about 30% of her overall pain.  Her more typical pain is on the outside of her ankle.  She has been unable to return to running due to pain.  She is obviously quite frustrated by this process.    08/14/2024: Patient returns s/p right ankle arthroscopy with extensive debridement and syndesmotic stabilization from 08/01/2024.  Patient is currently reporting 2 out of 10 pain that is gradually improving.  She  reports that the majority of her preoperative pain has actually resolved.  Patient has been compliant with nonweightbearing restrictions in a short leg fiberglass splint.  She is present with her mother today.    08/27/2024: Patient returns s/p right ankle arthroscopy with extensive debridement and syndesmotic stabilization from 08/01/2024.  Patient is currently reporting 2 out of 10 pain that is gradually improving.  She continues to note significant improvement from her preoperative state.  She has been ambulating with use of crutches and continued on aspirin.  She reports that pain at this point feels surgical.  She is present with her mother today.     09/20/2024: Patient returns s/p right ankle arthroscopy with extensive debridement and syndesmotic stabilization from 08/01/2024.  Patient has been in physical pain that is gradually proving.  She has not had any recurrence of events.  She has continued in physical therapy and is utilizing a lace up style ankle brace.  She has begun training with the [employer].  She denies any new trauma or swelling.    11/12/2024: Patient returns s/p right ankle arthroscopy with extensive debridement and syndesmotic stabilization from 08/01/2024.  Patient is currently reporting no pain.  She has been working with physical therapy.  She had sudden worsening of her pain by both discussion with the patient as well as EMR review following a trip to [facility] including a wedding.  This has resolved.    Past Medical History  Past Medical History:   Diagnosis Date    Allergy to milk products     Anemia     GERD (gastroesophageal reflux disease)     Unspecified asthma, uncomplicated ([facility])     Childhood asthma - stable       Surgical History  Recent Surgeries in Orthopaedic Surgery            No cases to display             Social History  Social History     Socioeconomic History    Marital status: Single   Tobacco Use    Smoking status: Never     Passive exposure: Never     Smokeless tobacco: Never   Vaping Use    Vaping status: Never Used   Substance and Sexual Activity    Alcohol use: Never    Drug use: Never    Sexual activity: Defer       Family History  Family History   Problem Relation Name Age of Onset    No Known Problems Mother      No Known Problems Father      No Known Problems Brother          Allergies  Allergies   Allergen Reactions    Milk Containing Products (Dairy) Diarrhea       Review of Systems  REVIEW OF SYSTEMS  Constitutional: no unplanned weight loss  Psychiatric: no suicidal ideation  ENT: no vision changes, no sinus problems  Pulmonary: no shortness of breath  Lymphatic: no enlarged lymph nodes  Cardiovascular: no chest pain or shortness of breath  Gastrointestinal: no stomach problems  Genitourinary: no dysuria   Skin: no rashes  Endocrine: no thyroid problems  Neurological: no headache, no numbness  Hematological: no easy bruising  Musculoskeletal: Right ankle pain    Physical Exam  PHYSICAL EXAMINATION  Constitutional Exam: well developed and well nourished  Psychiatric Exam: alert and oriented, appropriate mood and behavior  Eye Exam: EOMI  Pulmonary Exam: breathing non-labored, no apparent distress  Lymphatic exam: no appreciable lymphadenopathy in the lower extremities  Cardiovascular exam: RRR to peripheral palpation, DP pulses 2+, PT 2+, toes are pink with good capillary refill, no pitting edema  Skin exam: no open lesions, rashes, abrasions or ulcerations  Neurological exam: sensation to light touch intact in both lower extremities in peripheral and dermatomal distributions (except for any abnormalities noted in musculoskeletal exam)    Musculoskeletal exam: Right lower extremity examination.  Patient ankle incisions well appearing without incisional tenderness to palpation.  There is no residual edema.  No ankle effusion. I am able to passively range her ankle to neutral dorsiflexion. She has sensation intact light touch grossly in a saphenous,  sural, superficial peroneal, deep peroneal and tibial nerve distribution.  She has 5 out of 5 strength in plantarflexion, dorsiflexion and EHL.  She is 2+ DP/PT pulse palpated.  Patient has a negative anterior drawer, negative talar tilt, negative dorsiflexion external rotation stress test.    Last Recorded Vitals  There were no vitals taken for this visit.    Laboratory Results    No results found for this or any previous visit (from the past 24 hours).    Radiology Results   X-ray imaging 3 view weightbearing right ankle reviewed from 11/12/2024 reviewed and independently evaluated by me demonstrates appropriate position alignment following diversion flexible syndesmotic fixation with lateral fibula plate and medial cortical button.  No increased distal tib-fib clear space.  Ankle mortise appears well aligned. There is no sariah-implant fracture, lucency or loosening.    Assessment/Plan:  19-year-old female s/p right ankle arthroscopy with extensive debridement and syndesmotic stabilization from 08/01/2024 who presents with both clinical and radiographic evidence of healing.  I will recommend the patient continue weightbearing to tolerance in the right lower extremity.  I have no formal restrictions for her at this time point.  She should continue with physical therapy as she transitions back to her usual activities including running.  I would plan to see the patient back in 3 months to follow her clinical course.  Upon return patient would require three-view weightbearing right ankle.    Antwon Washington MD, BINTA  Department of Orthopaedic Surgery  [facility]

## 2024-11-14 ENCOUNTER — TREATMENT (OUTPATIENT)
Dept: PHYSICAL THERAPY | Facility: HOSPITAL | Age: 19
End: 2024-11-14
Payer: COMMERCIAL

## 2024-11-14 DIAGNOSIS — S93.431A ANKLE SYNDESMOSIS DISRUPTION, RIGHT, INITIAL ENCOUNTER: ICD-10-CM

## 2024-11-14 DIAGNOSIS — M25.571 ACUTE RIGHT ANKLE PAIN: Primary | ICD-10-CM

## 2024-11-14 PROCEDURE — 97016 VASOPNEUMATIC DEVICE THERAPY: CPT | Mod: GP

## 2024-11-14 PROCEDURE — 97110 THERAPEUTIC EXERCISES: CPT | Mod: GP

## 2024-11-14 NOTE — PROGRESS NOTES
Physical Therapy  Physical Therapy Treatment Note    Patient Name: Maribell Rosales  MRN: [id number]  Today's Date: 11/14/2024  Time Calculation  Start Time: 1105  Stop Time: 1205  Time Calculation (min): 60 min    Insurance:  Visit number: 19 of 20  Authorization info: hard limit of 20  Insurance Type: [facility]    General:  Reason for visit: s/p right ankle arthroscopy with extensive debridement and syndesmotic stabilization from 08/01/2024   Referred by: Dr. Washington      Current Problem:  1. Acute right ankle pain  Follow Up In Physical Therapy      2. Ankle syndesmosis disruption, right, initial encounter  Follow Up In Physical Therapy            Procedure: s/p right ankle arthroscopy with extensive debridement and syndesmotic stabilization from 08/01/2024          Medical History Form: Reviewed (scanned into chart)    Subjective:     Visit #19/20: Maribell states she is feeling really good, had good follow up with surgeon, told she has no restrictions.      Pain:  0/10 around the ankle    Red Flags: Do you have any of the following? No  Fever/chills, unexplained weight changes, dizziness/fainting, unexplained change in bowel or bladder functions, unexplained malaise or muscle weakness, night pain/sweats, numbness or tingling    Objective:  Objective     Wells DVT Criteria:  Active Cancer (1):  no  Immobility >3 days or major surgery <4 weeks (1): yes  Calf swelling >3 cm compared with other calf (1): no  collateral (non varicose) superficial veins present (1): no  entire leg swollen (1): no  localized tenderness along deep venous system (1): no  pitting edema, greater in symptomatic leg (1): no  paralysis, paresis, or recent plaster immobilization of the lower extremity (1): no  previously documented DVT (1): no  alternative diagnosis to DVT as likely or more likely (-2): yes  -2-0: low risk   1-2 moderate risk  >3 high risk      Observation: Incisions healing well, sutures have been removed, no signs of  "infection      ROM          Ankle AROM (Degrees)      (R)  (L)  Plantarflexion: 70  70    Dorsiflexion: 10  10    Inversion: 30  45     Eversion: 12  15         Ankle PROM (Degrees)      (R)  (L)  Plantarflexion: 55       Dorsiflexion: 5      Inversion:        Eversion:                Strength Testing    Ankle    (R)  (L)  Plantarflexion: 3  5      Dorsiflexion: 3  5    Inversion: nt  5     Eversion: nt  5         Palpation: no pain with palpation, slight reduced sensation over surgical incisions      Ankle/Foot Joint Mobility: hypomobile R talocrural joint    CKC DF  R: 4 inches  L: 6 inches      Effusion: 48.5 cm (figure 8)      Outcome Measures:          EDUCATION: Home exercise program, plan of care, activity modifications, pain management, and injury pathology       Goals: Set and discussed today  Active       PT Problem       Increase R ankle AROM to 15-20 degrees DF, 60-70 degrees PF and 20 degrees EV.        Start:  03/28/24    Expected End:  05/27/24            Increase R ankle MMT to 5/5 throughout and improve R single heel raise to at least 4\".       Start:  03/28/24    Expected End:  05/27/24            Increase length in R gastrocs to 10 degrees.       Start:  03/28/24    Expected End:  05/27/24            Pt will amb with proper HS/TO pattern and equal stance time/step length to restore normal gait pattern.       Start:  03/28/24    Expected End:  05/27/24            Pt will maintain R SLS for 30 seconds on firm surface without LOB.       Start:  03/28/24    Expected End:  05/27/24            Pt will report 0/10 R ankle pain to improve prolonged standing and walking, and allow return to exercise classes.       Start:  03/28/24    Expected End:  05/27/24            Improve LEFS by at least 9 points (MDIC).       Start:  03/28/24    Expected End:  05/27/24                  Plan of care was developed with input and agreement by the patient    Treatment Performed:      Therapeutic Exercise:    45 " min    Upright bike  Side steps/monster walks green band 2x10 around toebox  Single leg fire hydrant 3x10 each leg green band    DL press jump  level 5 1 yc 3x12  SL press jump  level 5 3x10 each  SL knee ext 25# 4x8  SL knee flexion 15# 4x8  DL knee ext 35# 4x8  SL balance on airex pad with paloff press orange band 20x each direction  DL heel raise to SL lower 3x12 on deficit      Vaso to R ankle med compression, 15 minutes      Assessment    Maribell Rosales is progressing towards their goals as evidenced by compliance with HEP, progressing with LE strength, decreasing pain.  Today's treatment was progressed by adding LE strength, more SL balance and added SL knee ext/SL knee flexion and DL knee ext. Noticbly weaker on surgical leg compared to non surgical leg but no pain with progressions.  Manual/Verbal/Tactile cues provided during all exercises to ensure proper pacing and going through appropriate ROM. Discussed adding single leg strength into gym routine.  Patient would continue to benefit from skilled PT to address remaining functional, objective and subjective deficits to allow them to return to full independence with ADLs and iADLs.         Clinical Presentation: Stable and/or uncomplicated characteristics      Laien Clay, PT

## 2024-11-21 ENCOUNTER — TREATMENT (OUTPATIENT)
Dept: PHYSICAL THERAPY | Facility: HOSPITAL | Age: 19
End: 2024-11-21
Payer: COMMERCIAL

## 2024-11-21 DIAGNOSIS — M25.571 ACUTE RIGHT ANKLE PAIN: Primary | ICD-10-CM

## 2024-11-21 DIAGNOSIS — S93.431A ANKLE SYNDESMOSIS DISRUPTION, RIGHT, INITIAL ENCOUNTER: ICD-10-CM

## 2024-11-21 PROCEDURE — 97110 THERAPEUTIC EXERCISES: CPT | Mod: GP

## 2024-11-21 PROCEDURE — 97016 VASOPNEUMATIC DEVICE THERAPY: CPT | Mod: GP

## 2024-11-21 NOTE — PROGRESS NOTES
Physical Therapy  Physical Therapy Treatment Note    Patient Name: Maribell Rosales  MRN: [id number]  Today's Date: 11/21/2024  Time Calculation  Start Time: 1100  Stop Time: 1210  Time Calculation (min): 70 min    Insurance:  Visit number: 20 of 20  Authorization info: hard limit of 20  Insurance Type: [facility]    General:  Reason for visit: s/p right ankle arthroscopy with extensive debridement and syndesmotic stabilization from 08/01/2024   Referred by: Dr. Washington      Current Problem:  1. Acute right ankle pain        2. Ankle syndesmosis disruption, right, initial encounter              Procedure: s/p right ankle arthroscopy with extensive debridement and syndesmotic stabilization from 08/01/2024          Medical History Form: Reviewed (scanned into chart)    Subjective:     Visit #20/20: Maribell states she is feeling really good, had good follow up with surgeon, told she has no restrictions. States she finally feels great, able to work out without issue.       Pain:  0/10 around the ankle    Red Flags: Do you have any of the following? No  Fever/chills, unexplained weight changes, dizziness/fainting, unexplained change in bowel or bladder functions, unexplained malaise or muscle weakness, night pain/sweats, numbness or tingling    Objective:  Objective     Wells DVT Criteria:  Active Cancer (1):  no  Immobility >3 days or major surgery <4 weeks (1): yes  Calf swelling >3 cm compared with other calf (1): no  collateral (non varicose) superficial veins present (1): no  entire leg swollen (1): no  localized tenderness along deep venous system (1): no  pitting edema, greater in symptomatic leg (1): no  paralysis, paresis, or recent plaster immobilization of the lower extremity (1): no  previously documented DVT (1): no  alternative diagnosis to DVT as likely or more likely (-2): yes  -2-0: low risk   1-2 moderate risk  >3 high risk      Observation: Incisions healing well, sutures have been removed, no signs of  "infection      ROM          Ankle AROM (Degrees)      (R)  (L)  Plantarflexion: 70  70    Dorsiflexion: 10  10    Inversion: 30  45     Eversion: 12  15         Ankle PROM (Degrees)      (R)  (L)  Plantarflexion: 55       Dorsiflexion: 5      Inversion:        Eversion:                Strength Testing    Ankle    (R)  (L)  Plantarflexion: 3  5      Dorsiflexion: 3  5    Inversion: nt  5     Eversion: nt  5         Palpation: no pain with palpation, slight reduced sensation over surgical incisions      Ankle/Foot Joint Mobility: hypomobile R talocrural joint    CKC DF  R: 4 inches  L: 6 inches      Effusion: 48.5 cm (figure 8)      Outcome Measures:          EDUCATION: Home exercise program, plan of care, activity modifications, pain management, and injury pathology       Goals: Set and discussed today  Active       PT Problem       Increase R ankle AROM to 15-20 degrees DF, 60-70 degrees PF and 20 degrees EV.        Start:  03/28/24    Expected End:  05/27/24            Increase R ankle MMT to 5/5 throughout and improve R single heel raise to at least 4\".       Start:  03/28/24    Expected End:  05/27/24            Increase length in R gastrocs to 10 degrees.       Start:  03/28/24    Expected End:  05/27/24            Pt will amb with proper HS/TO pattern and equal stance time/step length to restore normal gait pattern.       Start:  03/28/24    Expected End:  05/27/24            Pt will maintain R SLS for 30 seconds on firm surface without LOB.       Start:  03/28/24    Expected End:  05/27/24            Pt will report 0/10 R ankle pain to improve prolonged standing and walking, and allow return to exercise classes.       Start:  03/28/24    Expected End:  05/27/24            Improve LEFS by at least 9 points (MDIC).       Start:  03/28/24    Expected End:  05/27/24                    Plan of care was developed with input and agreement by the patient    Treatment Performed:      Therapeutic Exercise:    55 " min    Elliptical 5 minutes  Calf stretch/PF stretch on wedge 3x30 sec hold  Side steps/monster walks green band 2x10 around toebox  Single leg fire hydrant 3x10 each leg green band    DL press rogue machine, 5 holes showing, 4x8  SL press rogue machine, 5 holes showing, 25# added, 4x8 each  SL [place] SS 2 9# kbs 4x8 each leg  DL heel raise to SL lower on jump  level 1- 3 rounds with reps to faitgue  Seated heel raise with blue KB on knee 4x6 reps  SL balance on inverted wedge with ball toss at rebounder 20x each direction  DL jumps on jump  level 1- 10x      Vaso to R ankle med compression, 15 minutes      Assessment    Maribell Rosales is progressing towards their goals as evidenced by compliance with HEP, progressing with LE strength, decreasing pain.  Today's treatment was progressed by adding LE strength and balance- also added DL plyos on jump  with 50% bw as well as more SL strength and stability. She did well with all progressions, no pain. She does report difficulty with end range PF strengh and we continue to challenge this in different ways as wlel as education to change up sets/reps and weight scheme.  Manual/Verbal/Tactile cues provided during all exercises to ensure proper pacing and going through appropriate ROM. Discussed adding single leg strength into gym routine.  Patient would continue to benefit from skilled PT to address remaining functional, objective and subjective deficits to allow them to return to full independence with ADLs and iADLs.         Clinical Presentation: Stable and/or uncomplicated characteristics      Laine Clay, PT

## 2024-11-26 ENCOUNTER — APPOINTMENT (OUTPATIENT)
Dept: PHYSICAL THERAPY | Facility: HOSPITAL | Age: 19
End: 2024-11-26
Payer: COMMERCIAL

## 2024-11-26 DIAGNOSIS — M25.871 ANKLE IMPINGEMENT SYNDROME, RIGHT: ICD-10-CM

## 2024-11-26 RX ORDER — OMEPRAZOLE 20 MG/1
20 CAPSULE, DELAYED RELEASE ORAL
Qty: 90 CAPSULE | Refills: 1 | Status: SHIPPED | OUTPATIENT
Start: 2024-11-26 | End: 2025-04-01

## 2024-12-06 ENCOUNTER — TREATMENT (OUTPATIENT)
Dept: PHYSICAL THERAPY | Facility: HOSPITAL | Age: 19
End: 2024-12-06

## 2024-12-06 DIAGNOSIS — M25.571 ACUTE RIGHT ANKLE PAIN: Primary | ICD-10-CM

## 2024-12-06 DIAGNOSIS — M89.8X1 PAIN IN SCAPULA: ICD-10-CM

## 2024-12-06 DIAGNOSIS — S93.431A ANKLE SYNDESMOSIS DISRUPTION, RIGHT, INITIAL ENCOUNTER: ICD-10-CM

## 2024-12-06 PROCEDURE — 4200000003 HC PT PACKAGE TREATMENT: Mod: GP

## 2024-12-06 NOTE — PROGRESS NOTES
Physical Therapy  Physical Therapy Treatment Note    Patient Name: Maribell Rosales  MRN: [id number]  Today's Date: 12/6/2024  Time Calculation  Start Time: 0834  Stop Time: 0947  Time Calculation (min): 73 min    Insurance:  Visit number: 20 of 20  Authorization info: hard limit of 20--PACKAGE PAY STARTING 12/6  Insurance Type: [facility]    General:  Reason for visit: s/p right ankle arthroscopy with extensive debridement and syndesmotic stabilization from 08/01/2024   Referred by: Dr. Washington      Current Problem:  1. Acute right ankle pain  Follow Up In Physical Therapy      2. Ankle syndesmosis disruption, right, initial encounter  Follow Up In Physical Therapy      3. Pain in scapula                Procedure: s/p right ankle arthroscopy with extensive debridement and syndesmotic stabilization from 08/01/2024          Medical History Form: Reviewed (scanned into chart)    Subjective:     Visit #20/20: Maribell states she is feeling really good, had good follow up with surgeon, told she has no restrictions. States she finally feels great, able to work out without issue.       Pain:  0/10 around the ankle    Red Flags: Do you have any of the following? No  Fever/chills, unexplained weight changes, dizziness/fainting, unexplained change in bowel or bladder functions, unexplained malaise or muscle weakness, night pain/sweats, numbness or tingling    Objective:  Objective     Wells DVT Criteria:  Active Cancer (1):  no  Immobility >3 days or major surgery <4 weeks (1): yes  Calf swelling >3 cm compared with other calf (1): no  collateral (non varicose) superficial veins present (1): no  entire leg swollen (1): no  localized tenderness along deep venous system (1): no  pitting edema, greater in symptomatic leg (1): no  paralysis, paresis, or recent plaster immobilization of the lower extremity (1): no  previously documented DVT (1): no  alternative diagnosis to DVT as likely or more likely (-2): yes  -2-0: low risk   1-2  "moderate risk  >3 high risk      Observation: Incisions healing well, sutures have been removed, no signs of infection      ROM          Ankle AROM (Degrees)      (R)  (L)  Plantarflexion: 70  70    Dorsiflexion: 10  10    Inversion: 30  45     Eversion: 12  15         Ankle PROM (Degrees)      (R)  (L)  Plantarflexion: 55       Dorsiflexion: 5      Inversion:        Eversion:                Strength Testing    Ankle    (R)  (L)  Plantarflexion: 3  5      Dorsiflexion: 3  5    Inversion: nt  5     Eversion: nt  5    Force Frame Testing 24    Supine Ankle PF at 0 degrees DF:    R: 233, 202, 229 av N (49 pounds)  L: 229, 237, 264 av (54 pounds)  LSI: 90%    Supine ankle PF at 20 deg ankle PF:  R: 228, 220, 227 Av (50 pounds)  L: 213, 212, 223 Av (48 pounds)  LSI: 96%    Metronome heel raise (100 bpm):  R: 6  L: 12  LSI: 50%        Palpation: no pain with palpation, slight reduced sensation over surgical incisions      Ankle/Foot Joint Mobility: hypomobile R talocrural joint    CKC DF  R: 4 inches  L: 6 inches      Effusion: 48.5 cm (figure 8)      Outcome Measures:          EDUCATION: Home exercise program, plan of care, activity modifications, pain management, and injury pathology       Goals: Set and discussed today  Active       PT Problem       Increase R ankle AROM to 15-20 degrees DF, 60-70 degrees PF and 20 degrees EV.        Start:  24    Expected End:  24            Increase R ankle MMT to 5/5 throughout and improve R single heel raise to at least 4\".       Start:  24    Expected End:  24            Increase length in R gastrocs to 10 degrees.       Start:  24    Expected End:  24            Pt will amb with proper HS/TO pattern and equal stance time/step length to restore normal gait pattern.       Start:  24    Expected End:  24            Pt will maintain R SLS for 30 seconds on firm surface without LOB.       Start:  24    " Expected End:  05/27/24            Pt will report 0/10 R ankle pain to improve prolonged standing and walking, and allow return to exercise classes.       Start:  03/28/24    Expected End:  05/27/24            Improve LEFS by at least 9 points (MDIC).       Start:  03/28/24    Expected End:  05/27/24                      Plan of care was developed with input and agreement by the patient    Treatment Performed:      Therapeutic Exercise:    55 min    Elliptical 5 minutes  Calf stretch/PF stretch on wedge 3x30 sec hold  Foam rolling calves 15x  Side steps/monster walks green band 2x10 around toebox  Single leg fire hydrant 3x10 each leg green band    SL balance on inverted wedge for PF bias, then upright wedge for DF bias and stability ball alphabet 1x each leg each position  DL jumps on jump  level 2- 5x10  SL alternating jumps on jump  level 2- 3x10 each leg  SL to SL on jump  level 2- attempted but produced pain 5/10 when landing, did not continue with      Vaso to R ankle med compression, 15 minutes    Not today:    DL press rogue machine, 5 holes showing, 4x8  SL press rogue machine, 5 holes showing, 25# added, 4x8 each  SL [place] SS 2 9# kbs 4x8 each leg  DL heel raise to SL lower on jump  level 1- 3 rounds with reps to faitgue  Seated heel raise with blue KB on knee 4x6 reps      Assessment    Maribell Rosales is progressing towards their goals as evidenced by compliance with HEP, decreased pain and improved function.  Today's treatment was progressed by objectively assessing plantarflexion strength with force frame analysis - she is >90% LSI for ankle PF strength at 0 deg DF and 20 deg PF. However when assessing endurance via metronome heel raise test, she is only 50% symmetrical. She was able to progress with low level plyos today with DL jumps and SL alternating jumps on jump  level 2 which she was not able to perform a few weeks ago. She also demos improvement in ability  to perform SL heel raise through full height without pain. Overall much improved- not able to perform SL jump to SL land on jump  today without pain therefore did not perform. Will continue to work on SL endurance and strength and SL dynamic stability.  Patient would continue to benefit from skilled PT to address remaining functional, objective and subjective deficits to allow them to return to full independence with ADLs and iADLs.         Plan: Continue strength, endurance, plyo progression (try SL)      Laine Clay, PT

## 2024-12-20 ENCOUNTER — TREATMENT (OUTPATIENT)
Dept: PHYSICAL THERAPY | Facility: HOSPITAL | Age: 19
End: 2024-12-20

## 2024-12-20 DIAGNOSIS — M25.571 ACUTE RIGHT ANKLE PAIN: Primary | ICD-10-CM

## 2024-12-20 DIAGNOSIS — S93.431A ANKLE SYNDESMOSIS DISRUPTION, RIGHT, INITIAL ENCOUNTER: ICD-10-CM

## 2024-12-20 PROCEDURE — 4200000003 HC PT PACKAGE TREATMENT: Mod: GP

## 2024-12-20 NOTE — PROGRESS NOTES
Physical Therapy  Physical Therapy Treatment Note    Patient Name: Maribell Rosales  MRN: [id number]  Today's Date: 12/20/2024  Time Calculation  Start Time: 0857  Stop Time: 0957  Time Calculation (min): 60 min    Insurance:  Visit number: 20 of 20  Authorization info: hard limit of 20--PACKAGE PAY STARTING 12/6  Insurance Type: [facility]    General:  Reason for visit: s/p right ankle arthroscopy with extensive debridement and syndesmotic stabilization from 08/01/2024   Referred by: Dr. Washington      Current Problem:  1. Acute right ankle pain        2. Ankle syndesmosis disruption, right, initial encounter                Procedure: s/p right ankle arthroscopy with extensive debridement and syndesmotic stabilization from 08/01/2024          Medical History Form: Reviewed (scanned into chart)    Subjective:     Visit #22 (PACKAGE PAY): Maribell states she felt really good after last session, has been in [place] the last week, did a lot of walking and notices after 20 minutes of walking she had increased pain to about 5/10 and would immediately stop when she stopped walking.  She states pain was along the outside.       Pain:  0/10 around the ankle    Red Flags: Do you have any of the following? No  Fever/chills, unexplained weight changes, dizziness/fainting, unexplained change in bowel or bladder functions, unexplained malaise or muscle weakness, night pain/sweats, numbness or tingling    Objective:  Objective     Wells DVT Criteria:  Active Cancer (1):  no  Immobility >3 days or major surgery <4 weeks (1): yes  Calf swelling >3 cm compared with other calf (1): no  collateral (non varicose) superficial veins present (1): no  entire leg swollen (1): no  localized tenderness along deep venous system (1): no  pitting edema, greater in symptomatic leg (1): no  paralysis, paresis, or recent plaster immobilization of the lower extremity (1): no  previously documented DVT (1): no  alternative diagnosis to DVT as likely or more  "likely (-2): yes  -2-0: low risk   1-2 moderate risk  >3 high risk      Observation: Incisions healing well, sutures have been removed, no signs of infection      ROM          Ankle AROM (Degrees)      (R)  (L)  Plantarflexion: 70  70    Dorsiflexion: 10  10    Inversion: 30  45     Eversion: 12  15         Ankle PROM (Degrees)      (R)  (L)  Plantarflexion: 55       Dorsiflexion: 5      Inversion:        Eversion:                Strength Testing    Ankle    (R)  (L)  Plantarflexion: 3  5      Dorsiflexion: 3  5    Inversion: nt  5     Eversion: nt  5    Force Frame Testing 24    Supine Ankle PF at 0 degrees DF:    R: 233, 202, 229 av N (49 pounds)  L: 229, 237, 264 av (54 pounds)  LSI: 90%    Supine ankle PF at 20 deg ankle PF:  R: 228, 220, 227 Av (50 pounds)  L: 213, 212, 223 Av (48 pounds)  LSI: 96%    Metronome heel raise (100 bpm):  R: 6  L: 12  LSI: 50%        Palpation: no pain with palpation, slight reduced sensation over surgical incisions      Ankle/Foot Joint Mobility: hypomobile R talocrural joint    CKC DF  R: 4 inches  L: 6 inches      Effusion: 48.5 cm (figure 8)      Outcome Measures:          EDUCATION: Home exercise program, plan of care, activity modifications, pain management, and injury pathology       Goals: Set and discussed today  Active       PT Problem       Increase R ankle AROM to 15-20 degrees DF, 60-70 degrees PF and 20 degrees EV.        Start:  24    Expected End:  24            Increase R ankle MMT to 5/5 throughout and improve R single heel raise to at least 4\".       Start:  24    Expected End:  24            Increase length in R gastrocs to 10 degrees.       Start:  24    Expected End:  24            Pt will amb with proper HS/TO pattern and equal stance time/step length to restore normal gait pattern.       Start:  24    Expected End:  24            Pt will maintain R SLS for 30 seconds on firm surface " without LOB.       Start:  03/28/24    Expected End:  05/27/24            Pt will report 0/10 R ankle pain to improve prolonged standing and walking, and allow return to exercise classes.       Start:  03/28/24    Expected End:  05/27/24            Improve LEFS by at least 9 points (MDIC).       Start:  03/28/24    Expected End:  05/27/24                  Plan of care was developed with input and agreement by the patient    Treatment Performed:      Therapeutic Exercise:    50 min    Elliptical 5 minutes  Calf stretch/PF stretch on wedge 3x30 sec hold  Foam rolling calves 15x  Side steps/monster walks green band 2x10 around toebox  Single leg fire hydrant 3x10 each leg green band    DL heel raise, ball between feet, 3x12  DL jumps on jump  level 3- 3x10  RFESS 2 15# weights, 4x8  SL knee ext 25# 4x8 each leg  SL knee flexion 15# 4x8 each leg  SL press [employer] machine 45# added, 4x8 each  Medial cross over step on airex pad 3x10      Vaso to R ankle med compression, 10 minutes        Assessment    Maribell Rosales is progressing towards their goals as evidenced by compliance with HEP, does have some soreness from walking a lot in [place]. Tried to progress plyos today but having some soreness with double leg plyos so did not continue. Focused on single leg strength today since she has not been able to perform this over the last week. Still has noticeable strength deficit in R leg as compared to Left- globally.   Patient would continue to benefit from skilled PT to address remaining functional, objective and subjective deficits to allow them to return to full independence with ADLs and iADLs.         Plan: Continue strength, endurance, plyo progression (try SL)      Laine Clay, PT

## 2024-12-23 ENCOUNTER — PATIENT MESSAGE (OUTPATIENT)
Dept: INTEGRATIVE MEDICINE | Facility: CLINIC | Age: 19
End: 2024-12-23
Payer: COMMERCIAL

## 2025-01-03 ENCOUNTER — TREATMENT (OUTPATIENT)
Dept: PHYSICAL THERAPY | Facility: HOSPITAL | Age: 20
End: 2025-01-03
Payer: COMMERCIAL

## 2025-01-03 DIAGNOSIS — M25.571 ACUTE RIGHT ANKLE PAIN: Primary | ICD-10-CM

## 2025-01-03 DIAGNOSIS — S93.431A ANKLE SYNDESMOSIS DISRUPTION, RIGHT, INITIAL ENCOUNTER: ICD-10-CM

## 2025-01-03 PROCEDURE — 97016 VASOPNEUMATIC DEVICE THERAPY: CPT | Mod: GP

## 2025-01-03 PROCEDURE — 97110 THERAPEUTIC EXERCISES: CPT | Mod: GP

## 2025-01-03 NOTE — PROGRESS NOTES
Physical Therapy  Physical Therapy Treatment Note    Patient Name: Maribell Rosales  MRN: [id number]  Today's Date: 1/3/2025  Time Calculation  Start Time: 0905  Stop Time: 1005  Time Calculation (min): 60 min    Insurance:  Visit number: 1 of 20  Authorization info: hard limit of 20  Insurance Type: [facility]    General:  Reason for visit: s/p right ankle arthroscopy with extensive debridement and syndesmotic stabilization from 08/01/2024   Referred by: Dr. Washington      Current Problem:  1. Acute right ankle pain        2. Ankle syndesmosis disruption, right, initial encounter            Procedure: s/p right ankle arthroscopy with extensive debridement and syndesmotic stabilization from 08/01/2024          Medical History Form: Reviewed (scanned into chart)    Subjective:     Visit #23 (1/20 new year): Maribell states she felt really good after last session, denies pain coming into today's session. Feels she has been in a good routine with getting back into working out.      Pain:  0/10 around the ankle    Red Flags: Do you have any of the following? No  Fever/chills, unexplained weight changes, dizziness/fainting, unexplained change in bowel or bladder functions, unexplained malaise or muscle weakness, night pain/sweats, numbness or tingling    Objective:  Objective     [facility]s DVT Criteria:  Active Cancer (1):  no  Immobility >3 days or major surgery <4 weeks (1): yes  Calf swelling >3 cm compared with other calf (1): no  collateral (non varicose) superficial veins present (1): no  entire leg swollen (1): no  localized tenderness along deep venous system (1): no  pitting edema, greater in symptomatic leg (1): no  paralysis, paresis, or recent plaster immobilization of the lower extremity (1): no  previously documented DVT (1): no  alternative diagnosis to DVT as likely or more likely (-2): yes  -2-0: low risk   1-2 moderate risk  >3 high risk      Observation: Incisions healing well, sutures have been removed, no signs of  "infection      ROM          Ankle AROM (Degrees)      (R)  (L)  Plantarflexion: 70  70    Dorsiflexion: 10  10    Inversion: 30  45     Eversion: 12  15         Ankle PROM (Degrees)      (R)  (L)  Plantarflexion: 55       Dorsiflexion: 5      Inversion:        Eversion:                Strength Testing    Ankle    (R)  (L)  Plantarflexion: 3  5      Dorsiflexion: 3  5    Inversion: nt  5     Eversion: nt  5    Force Frame Testing 24    Supine Ankle PF at 0 degrees DF:    R: 233, 202, 229 av N (49 pounds)  L: 229, 237, 264 av (54 pounds)  LSI: 90%    Supine ankle PF at 20 deg ankle PF:  R: 228, 220, 227 Av (50 pounds)  L: 213, 212, 223 Av (48 pounds)  LSI: 96%    Metronome heel raise (100 bpm):  R: 6  L: 12  LSI: 50%        Palpation: no pain with palpation, slight reduced sensation over surgical incisions      Ankle/Foot Joint Mobility: hypomobile R talocrural joint    CKC DF  R: 4 inches  L: 6 inches      Effusion: 48.5 cm (figure 8)      Outcome Measures:          EDUCATION: Home exercise program, plan of care, activity modifications, pain management, and injury pathology       Goals: Set and discussed today  Active       PT Problem       Increase R ankle AROM to 15-20 degrees DF, 60-70 degrees PF and 20 degrees EV.        Start:  24    Expected End:  24            Increase R ankle MMT to 5/5 throughout and improve R single heel raise to at least 4\".       Start:  24    Expected End:  24            Increase length in R gastrocs to 10 degrees.       Start:  24    Expected End:  24            Pt will amb with proper HS/TO pattern and equal stance time/step length to restore normal gait pattern.       Start:  24    Expected End:  24            Pt will maintain R SLS for 30 seconds on firm surface without LOB.       Start:  24    Expected End:  24            Pt will report 0/10 R ankle pain to improve prolonged standing and " walking, and allow return to exercise classes.       Start:  03/28/24    Expected End:  05/27/24            Improve LEFS by at least 9 points (MDIC).       Start:  03/28/24    Expected End:  05/27/24                  Plan of care was developed with input and agreement by the patient    Treatment Performed:      Therapeutic Exercise:    50 min    Elliptical 5 minutes  Calf stretch/PF stretch on wedge 3x30 sec hold  Foam rolling calves 15x  Side steps/monster walks green band 2x10 around toebox  Single leg fire hydrant 3x10 each leg green band  DL squat jump 2x10  SL step hold 2x10 per leg  Skater hops 2x10  SL pogos with band assist 20x each leg, 3x  SL jump to SL land jump  level 2- still produces pain   SL heel raise 4x8, 15#    Not today  DL heel raise, ball between feet, 3x12  DL jumps on jump  level 3- 3x10  RFESS 2 15# weights, 4x8  SL knee ext 25# 4x8 each leg  SL knee flexion 15# 4x8 each leg  SL press [employer] machine 45# added, 4x8 each  Medial cross over step on airex pad 3x10      Vaso to R ankle med compression, 10 minutes        Assessment    Maribell Rosales is progressing towards their goals as evidenced by compliance with HEP, less pain, improved tolerance to load.  Today's treatment was progressed by adding pogos, double leg body weight squats, single leg step holds, skater hops. She does still get some pain with single leg jump to single leg land on jump  so still not adding single leg plyos or running yet. Want to build up loading tolerance to sL plyos and pogos before running. We did trial light jogging though today and she was able to perform without pain or reactive effusion.  Manual/Verbal/Tactile cues provided during all exercises especially single leg landing to ensure load is being absorbed by all joints and not specifically going straight to the ankle. Discussed continuing with endurance and strength based single leg loading as well as pogos. Will continue to progress  loading tolerance.  Patient would continue to benefit from skilled PT to address remaining functional, objective and subjective deficits to allow them to return to full independence with ADLs and iADLs.           Plan: Continue strength, endurance, plyo progression (try SL)      Laine Clay, PT

## 2025-01-06 ENCOUNTER — APPOINTMENT (OUTPATIENT)
Dept: INTEGRATIVE MEDICINE | Facility: CLINIC | Age: 20
End: 2025-01-06
Payer: COMMERCIAL

## 2025-01-06 VITALS
HEART RATE: 68 BPM | BODY MASS INDEX: 24.63 KG/M2 | WEIGHT: 139 LBS | OXYGEN SATURATION: 98 % | SYSTOLIC BLOOD PRESSURE: 107 MMHG | DIASTOLIC BLOOD PRESSURE: 70 MMHG | HEIGHT: 63 IN

## 2025-01-06 DIAGNOSIS — K29.50 CHRONIC GASTRITIS, PRESENCE OF BLEEDING UNSPECIFIED, UNSPECIFIED GASTRITIS TYPE: ICD-10-CM

## 2025-01-06 DIAGNOSIS — D50.9 IRON DEFICIENCY ANEMIA, UNSPECIFIED IRON DEFICIENCY ANEMIA TYPE: Primary | ICD-10-CM

## 2025-01-06 DIAGNOSIS — E55.9 VITAMIN D DEFICIENCY: ICD-10-CM

## 2025-01-06 DIAGNOSIS — E53.8 VITAMIN B12 DEFICIENCY: ICD-10-CM

## 2025-01-06 PROCEDURE — 99215 OFFICE O/P EST HI 40 MIN: CPT | Performed by: INTERNAL MEDICINE

## 2025-01-06 RX ORDER — OMEPRAZOLE 20 MG/1
20 TABLET, DELAYED RELEASE ORAL DAILY
Qty: 60 TABLET | Refills: 1 | Status: SHIPPED | OUTPATIENT
Start: 2025-01-06 | End: 2025-05-06

## 2025-01-06 ASSESSMENT — ENCOUNTER SYMPTOMS
NAUSEA: 1
SLEEP DISTURBANCE: 0
DIFFICULTY URINATING: 0
ABDOMINAL PAIN: 1
WEAKNESS: 0
BACK PAIN: 0
APPETITE CHANGE: 0
FEVER: 0
DIARRHEA: 0
CONSTIPATION: 0
ABDOMINAL DISTENTION: 1
NERVOUS/ANXIOUS: 1
FATIGUE: 1
COUGH: 0
HEADACHES: 0
DYSURIA: 0
MYALGIAS: 0
SHORTNESS OF BREATH: 0
ARTHRALGIAS: 0

## 2025-01-06 NOTE — PATIENT INSTRUCTIONS
Try picky bars.   Stop nugo. bars  Decrease to one cup of coffee if possible.   Start omeprazole 20 mg by mouth twice daily for one week then once daily 30 minutes before eating.   5. Get the labs  See GI in March 2025 (double check if you are on the waitlist).   See me in February.   Uma Shepard MD PhD

## 2025-01-06 NOTE — PROGRESS NOTES
"Integrative Medicine Follow-up Visit :     Subjective   Patient ID: Maribell Rosales is a 19 y.o. female who presents for follow up        HPI  Still a masters student.   In the last three months she has developed nausea with a pain that goes across her stomach. Bloating. No chance she is pregnant pt tells me. Has at least one bowel movement daily. Formed. No straining.     Breakfast: two home made oat banana cookies. Coffee- oat milk  Dinner: split pea soup with vegetables, water  Snack\": popcorn, fruit, chocolate nugo bar  Lunch: bagel wth avocado an pupmpkin seeds.    Two cups of coffee.   Water mostly to drink.     Pain:pain is manageable.   Still dairy, egg, meat free.   Not taking omeprazole.     Eating sometimes makes the pain better.  Pain does not wake her. 0 pain now.     Review of Systems   Constitutional:  Positive for fatigue. Negative for appetite change and fever.   HENT:  Negative for congestion and hearing loss.    Eyes:  Negative for visual disturbance.   Respiratory:  Negative for cough and shortness of breath.    Cardiovascular:  Negative for chest pain and leg swelling.   Gastrointestinal:  Positive for abdominal distention, abdominal pain (hx of severe painful reflux but none now) and nausea. Negative for constipation and diarrhea.   Genitourinary:  Negative for difficulty urinating, dysuria and urgency.   Musculoskeletal:  Negative for arthralgias, back pain and myalgias.   Skin:  Negative for rash.   Neurological:  Negative for weakness and headaches.   Psychiatric/Behavioral:  Negative for behavioral problems and sleep disturbance. The patient is nervous/anxious (well controlled but hx of anxiety).                   Objective   /70 (BP Location: Left arm, Patient Position: Sitting, BP Cuff Size: Adult)   Pulse 68   Ht 1.6 m (5' 3\")   Wt 63 kg (139 lb)   SpO2 98%   BMI 24.62 kg/m²     Physical Exam  HENT:      Head: Normocephalic and atraumatic.      Mouth/Throat:      Mouth: Mucous " membranes are moist.   Cardiovascular:      Rate and Rhythm: Normal rate and regular rhythm.      Heart sounds: Normal heart sounds.   Pulmonary:      Effort: Pulmonary effort is normal. No respiratory distress.      Breath sounds: No wheezing.   Chest:      Chest wall: No tenderness.   Abdominal:      General: Abdomen is flat. Bowel sounds are normal. There is no distension.      Palpations: Abdomen is soft. There is no mass.      Tenderness: There is no abdominal tenderness. There is no guarding.   Musculoskeletal:         General: No swelling or deformity.      Cervical back: Normal range of motion.   Skin:     General: Skin is dry.      Capillary Refill: Capillary refill takes less than 2 seconds.      Coloration: Skin is not pale.      Findings: No rash.   Neurological:      General: No focal deficit present.      Mental Status: She is alert and oriented to person, place, and time.   Psychiatric:         Mood and Affect: Mood normal.         Thought Content: Thought content normal.      Comments: Normal affect                      Assessment/Plan     Problem List Items Addressed This Visit             ICD-10-CM    Iron deficiency anemia - Primary D50.9    Relevant Orders    H. pylori antigen, stool    CBC and Auto Differential    Iron and TIBC    Ferritin    Vitamin B12 deficiency E53.8    Relevant Orders    Vitamin B12    Vitamin D deficiency E55.9    Relevant Orders    Vitamin D 25-Hydroxy,Total (for eval of Vitamin D levels)     Other Visit Diagnoses         Codes    Chronic gastritis, presence of bleeding unspecified, unspecified gastritis type     K29.50    Relevant Medications    omeprazole OTC (PriLOSEC OTC) 20 mg EC tablet        Suspect gastritis. Start omeprazole 20 mg Po bid for one week then once daily. Screen for h pylori. Decrease coffee to one cup. Work on stress management.   Look for h pylori.       Recommend Follow up in : six weeks.     Uma Shepard MD PhD    Time Spent  Prep time on  day of patient encounter: 2 minutes  Time spent directly with patient, family or caregiver: 34 minutes  Additional Time Spent on Patient Care Activities: 0 minutes  Documentation Time: 5 minutes  Other Time Spent: 0 minutes  Total: 41 minutes

## 2025-01-08 ENCOUNTER — APPOINTMENT (OUTPATIENT)
Dept: PHYSICAL THERAPY | Facility: HOSPITAL | Age: 20
End: 2025-01-08
Payer: COMMERCIAL

## 2025-01-09 ENCOUNTER — TREATMENT (OUTPATIENT)
Dept: PHYSICAL THERAPY | Facility: HOSPITAL | Age: 20
End: 2025-01-09
Payer: COMMERCIAL

## 2025-01-09 DIAGNOSIS — S93.431A ANKLE SYNDESMOSIS DISRUPTION, RIGHT, INITIAL ENCOUNTER: ICD-10-CM

## 2025-01-09 DIAGNOSIS — M25.571 ACUTE RIGHT ANKLE PAIN: Primary | ICD-10-CM

## 2025-01-09 PROCEDURE — 97110 THERAPEUTIC EXERCISES: CPT | Mod: GP

## 2025-01-09 PROCEDURE — 97016 VASOPNEUMATIC DEVICE THERAPY: CPT | Mod: GP

## 2025-01-09 NOTE — PROGRESS NOTES
Physical Therapy  Physical Therapy Treatment Note    Patient Name: Maribell Rosales  MRN: [id number]  Today's Date: 1/9/2025  Time Calculation  Start Time: 0800  Stop Time: 0900  Time Calculation (min): 60 min    Insurance:  Visit number: 2 of 20  Authorization info: hard limit of 20  Insurance Type: [facility]    General:  Reason for visit: s/p right ankle arthroscopy with extensive debridement and syndesmotic stabilization from 08/01/2024   Referred by: Dr. Washington      Current Problem:  1. Acute right ankle pain        2. Ankle syndesmosis disruption, right, initial encounter              Procedure: s/p right ankle arthroscopy with extensive debridement and syndesmotic stabilization from 08/01/2024          Medical History Form: Reviewed (scanned into chart)    Subjective:     Visit #23 (2/20 new year): Maribell states she felt really good after last session, denies pain coming into today's session. Feels she has been in a good routine with getting back into working out. Feels no limits with working out, feels tight in morning but otherwise can do all work outs. Still not running/jumping.      Pain:  0/10 around the ankle    Red Flags: Do you have any of the following? No  Fever/chills, unexplained weight changes, dizziness/fainting, unexplained change in bowel or bladder functions, unexplained malaise or muscle weakness, night pain/sweats, numbness or tingling    Objective:  Objective     Assessment for running:  SL Calf raise (front leg on bench, back leg slightly extended) 30 bpm  3x8    SL calf raise (front leg on bench, back leg slightly extended) 120 bpm  3x8    DL pogos at 120 and 150 bpm  30 reps at each    SL pogos at 120 and 150 bpm  30 reps each leg          ROM          Ankle AROM (Degrees)      (R)  (L)  Plantarflexion: 70  70    Dorsiflexion: 10  10    Inversion: 30  45     Eversion: 12  15         Ankle PROM  "(Degrees)      (R)  (L)  Plantarflexion: 55       Dorsiflexion: 5      Inversion:        Eversion:                Strength Testing    Ankle    (R)  (L)  Plantarflexion: 3  5      Dorsiflexion: 3  5    Inversion: nt  5     Eversion: nt  5    Force Frame Testing 24    Supine Ankle PF at 0 degrees DF:    R: 233, 202, 229 av N (49 pounds)  L: 229, 237, 264 av (54 pounds)  LSI: 90%    Supine ankle PF at 20 deg ankle PF:  R: 228, 220, 227 Av (50 pounds)  L: 213, 212, 223 Av (48 pounds)  LSI: 96%    Metronome heel raise (100 bpm):  R: 6  L: 12  LSI: 50%        Palpation: no pain with palpation, slight reduced sensation over surgical incisions      Ankle/Foot Joint Mobility: hypomobile R talocrural joint    CKC DF  R: 4 inches  L: 6 inches      Effusion: 48.5 cm (figure 8)      Outcome Measures:          EDUCATION: Home exercise program, plan of care, activity modifications, pain management, and injury pathology       Goals: Set and discussed today  Active       PT Problem       Increase R ankle AROM to 15-20 degrees DF, 60-70 degrees PF and 20 degrees EV.        Start:  24    Expected End:  24            Increase R ankle MMT to 5/5 throughout and improve R single heel raise to at least 4\".       Start:  24    Expected End:  24            Increase length in R gastrocs to 10 degrees.       Start:  24    Expected End:  24            Pt will amb with proper HS/TO pattern and equal stance time/step length to restore normal gait pattern.       Start:  24    Expected End:  24            Pt will maintain R SLS for 30 seconds on firm surface without LOB.       Start:  24    Expected End:  24            Pt will report 0/10 R ankle pain to improve prolonged standing and walking, and allow return to exercise classes.       Start:  24    Expected End:  24            Improve LEFS by at least 9 points (MDIC).       Start:  24    " Expected End:  05/27/24                    Plan of care was developed with input and agreement by the patient    Treatment Performed:      Therapeutic Exercise:    50 min    Elliptical 5 minutes  Calf stretch/PF stretch on wedge 3x30 sec hold  Foam rolling calves 15x  Side steps/monster walks green band 2x10 around toebox  Single leg fire hydrant 3x10 each leg green band    Assessment for running:  SL Calf raise (front leg on bench, back leg slightly extended) 30 bpm  3x8    SL calf raise (front leg on bench, back leg slightly extended) 120 bpm  3x8    DL pogos at 120 and 150 bpm  30 reps at each    SL pogos at 120 and 150 bpm  30 reps each leg    Walk to jog program- phase 1, 3 rounds through      Vaso to R ankle med compression, 10 minutes      Assessment    Maribell Rosales is progressing towards their goals as evidenced by compliance with HEP, meeting criteria to initiate jogging today. Demonstrates ability to tolerate DL and SL pogos as well as adequate calf complex strength with SL heel raise test.   Today's treatment was progressed by adding walk to jog program, as she does demonstrate adequate performance on DL and SL calf complex strength testing to allow an initiation of walk to jog program.  Manual/Verbal/Tactile cues provided to ensure proper understanding and implementation of walk to jog program, understanding soreness rules and assessing for any increasing effusion.  Able to successfully get through 3 rounds of first phase today without any reactive pain or effusion.   Patient would continue to benefit from skilled PT to address remaining functional, objective and subjective deficits to allow them to return to full independence with ADLs and iADLs.             Plan: Assess response to walk to jog program      Laine Clay, PT

## 2025-01-14 ENCOUNTER — LAB (OUTPATIENT)
Dept: LAB | Facility: LAB | Age: 20
End: 2025-01-14
Payer: COMMERCIAL

## 2025-01-14 DIAGNOSIS — E55.9 VITAMIN D DEFICIENCY: ICD-10-CM

## 2025-01-14 DIAGNOSIS — E53.8 VITAMIN B12 DEFICIENCY: ICD-10-CM

## 2025-01-14 DIAGNOSIS — D50.9 IRON DEFICIENCY ANEMIA, UNSPECIFIED IRON DEFICIENCY ANEMIA TYPE: ICD-10-CM

## 2025-01-14 LAB
25(OH)D3 SERPL-MCNC: 20 NG/ML (ref 30–100)
BASOPHILS # BLD AUTO: 0.07 X10*3/UL (ref 0–0.1)
BASOPHILS NFR BLD AUTO: 1.1 %
EOSINOPHIL # BLD AUTO: 0.17 X10*3/UL (ref 0–0.7)
EOSINOPHIL NFR BLD AUTO: 2.7 %
ERYTHROCYTE [DISTWIDTH] IN BLOOD BY AUTOMATED COUNT: 12.1 % (ref 11.5–14.5)
FERRITIN SERPL-MCNC: 39 NG/ML (ref 8–150)
HCT VFR BLD AUTO: 39.1 % (ref 36–46)
HGB BLD-MCNC: 12.8 G/DL (ref 12–16)
IMM GRANULOCYTES # BLD AUTO: 0.02 X10*3/UL (ref 0–0.7)
IMM GRANULOCYTES NFR BLD AUTO: 0.3 % (ref 0–0.9)
IRON SATN MFR SERPL: 23 % (ref 25–45)
IRON SERPL-MCNC: 78 UG/DL (ref 35–150)
LYMPHOCYTES # BLD AUTO: 1.74 X10*3/UL (ref 1.2–4.8)
LYMPHOCYTES NFR BLD AUTO: 27.8 %
MCH RBC QN AUTO: 30.7 PG (ref 26–34)
MCHC RBC AUTO-ENTMCNC: 32.7 G/DL (ref 32–36)
MCV RBC AUTO: 94 FL (ref 80–100)
MONOCYTES # BLD AUTO: 0.66 X10*3/UL (ref 0.1–1)
MONOCYTES NFR BLD AUTO: 10.5 %
NEUTROPHILS # BLD AUTO: 3.6 X10*3/UL (ref 1.2–7.7)
NEUTROPHILS NFR BLD AUTO: 57.6 %
NRBC BLD-RTO: 0 /100 WBCS (ref 0–0)
PLATELET # BLD AUTO: 216 X10*3/UL (ref 150–450)
RBC # BLD AUTO: 4.17 X10*6/UL (ref 4–5.2)
TIBC SERPL-MCNC: 346 UG/DL (ref 240–445)
UIBC SERPL-MCNC: 268 UG/DL (ref 110–370)
VIT B12 SERPL-MCNC: 917 PG/ML (ref 211–911)
WBC # BLD AUTO: 6.3 X10*3/UL (ref 4.4–11.3)

## 2025-01-14 PROCEDURE — 83540 ASSAY OF IRON: CPT

## 2025-01-14 PROCEDURE — 83550 IRON BINDING TEST: CPT

## 2025-01-14 PROCEDURE — 82728 ASSAY OF FERRITIN: CPT

## 2025-01-14 PROCEDURE — 82306 VITAMIN D 25 HYDROXY: CPT

## 2025-01-14 PROCEDURE — 82607 VITAMIN B-12: CPT

## 2025-01-14 PROCEDURE — 85025 COMPLETE CBC W/AUTO DIFF WBC: CPT

## 2025-01-16 ENCOUNTER — TREATMENT (OUTPATIENT)
Dept: PHYSICAL THERAPY | Facility: HOSPITAL | Age: 20
End: 2025-01-16
Payer: COMMERCIAL

## 2025-01-16 ENCOUNTER — PATIENT MESSAGE (OUTPATIENT)
Dept: INTEGRATIVE MEDICINE | Facility: CLINIC | Age: 20
End: 2025-01-16

## 2025-01-16 DIAGNOSIS — S93.431A ANKLE SYNDESMOSIS DISRUPTION, RIGHT, INITIAL ENCOUNTER: ICD-10-CM

## 2025-01-16 DIAGNOSIS — D50.9 IRON DEFICIENCY ANEMIA, UNSPECIFIED IRON DEFICIENCY ANEMIA TYPE: ICD-10-CM

## 2025-01-16 DIAGNOSIS — K21.9 GASTROESOPHAGEAL REFLUX DISEASE, UNSPECIFIED WHETHER ESOPHAGITIS PRESENT: Primary | ICD-10-CM

## 2025-01-16 DIAGNOSIS — M25.571 ACUTE RIGHT ANKLE PAIN: Primary | ICD-10-CM

## 2025-01-16 LAB — H PYLORI AG STL QL IA: NEGATIVE

## 2025-01-16 PROCEDURE — 97110 THERAPEUTIC EXERCISES: CPT | Mod: GP

## 2025-01-16 NOTE — PROGRESS NOTES
Physical Therapy  Physical Therapy Treatment Note    Patient Name: Maribell Rosales  MRN: [id number]  Today's Date: 1/16/2025  Time Calculation  Start Time: 1100  Stop Time: 1145  Time Calculation (min): 45 min    Insurance:  Visit number: 3 of 20  Authorization info: hard limit of 20  Insurance Type: [facility]    General:  Reason for visit: s/p right ankle arthroscopy with extensive debridement and syndesmotic stabilization from 08/01/2024   Referred by: Dr. Washington      Current Problem:  1. Acute right ankle pain        2. Ankle syndesmosis disruption, right, initial encounter            Procedure: s/p right ankle arthroscopy with extensive debridement and syndesmotic stabilization from 08/01/2024          Medical History Form: Reviewed (scanned into chart)    Subjective:     Visit #24 (3/20 new year): Maribell states she felt really good after last session, working through phase 1 of walk to jog program, denies pain in ankle just fatigue with this. But overall reports it feels great.      Pain:  0/10 around the ankle    Red Flags: Do you have any of the following? No  Fever/chills, unexplained weight changes, dizziness/fainting, unexplained change in bowel or bladder functions, unexplained malaise or muscle weakness, night pain/sweats, numbness or tingling    Objective:  Objective     Assessment for running:  SL Calf raise (front leg on bench, back leg slightly extended) 30 bpm  3x8    SL calf raise (front leg on bench, back leg slightly extended) 120 bpm  3x8    DL pogos at 120 and 150 bpm  30 reps at each    SL pogos at 120 and 150 bpm  30 reps each leg          ROM          Ankle AROM (Degrees)      (R)  (L)  Plantarflexion: 70  70    Dorsiflexion: 10  10    Inversion: 30  45     Eversion: 12  15         Ankle PROM (Degrees)      (R)  (L)  Plantarflexion: 55       Dorsiflexion: 5      Inversion:        Eversion:                Strength  "Testing    Ankle    (R)  (L)  Plantarflexion: 3  5      Dorsiflexion: 3  5    Inversion: nt  5     Eversion: nt  5    Force Frame Testing 24    Supine Ankle PF at 0 degrees DF:    R: 233, 202, 229 av N (49 pounds)  L: 229, 237, 264 av (54 pounds)  LSI: 90%    Supine ankle PF at 20 deg ankle PF:  R: 228, 220, 227 Av (50 pounds)  L: 213, 212, 223 Av (48 pounds)  LSI: 96%    Metronome heel raise (100 bpm):  R: 6  L: 12  LSI: 50%        Palpation: no pain with palpation, slight reduced sensation over surgical incisions      Ankle/Foot Joint Mobility: hypomobile R talocrural joint    CKC DF  R: 4 inches  L: 6 inches      Effusion: 48.5 cm (figure 8)      Outcome Measures:          EDUCATION: Home exercise program, plan of care, activity modifications, pain management, and injury pathology       Goals: Set and discussed today  Active       PT Problem       Increase R ankle AROM to 15-20 degrees DF, 60-70 degrees PF and 20 degrees EV.        Start:  24    Expected End:  24            Increase R ankle MMT to 5/5 throughout and improve R single heel raise to at least 4\".       Start:  24    Expected End:  24            Increase length in R gastrocs to 10 degrees.       Start:  24    Expected End:  24            Pt will amb with proper HS/TO pattern and equal stance time/step length to restore normal gait pattern.       Start:  24    Expected End:  24            Pt will maintain R SLS for 30 seconds on firm surface without LOB.       Start:  24    Expected End:  24            Pt will report 0/10 R ankle pain to improve prolonged standing and walking, and allow return to exercise classes.       Start:  24    Expected End:  24            Improve LEFS by at least 9 points (MDIC).       Start:  24    Expected End:  24                  Plan of care was developed with input and agreement by the patient    Treatment " Performed:      Therapeutic Exercise:    45 min    Elliptical 5 minutes  Calf stretch/PF stretch on wedge 3x30 sec hold  Foam rolling calves 15x  Side steps/monster walks green band 2x10 around toebox  Single leg fire hydrant 3x10 each leg green band  SL balance with 4# MB toss  SL balance on airex pad with UE alpahbet  SL balance on airex pad with paloff (orange band press) 20 reps each  SL balance with upper body chop on inverted wedge 20x each direction  SL skater hops 2x10  DL broad jumps 10x  SL jump to DL land 5x each  Plyos on jump  level 4  DL 3x10  SL alternating 3x10  SL to SL 3x10    Not today:  Assessment for running:  SL Calf raise (front leg on bench, back leg slightly extended) 30 bpm  3x8    SL calf raise (front leg on bench, back leg slightly extended) 120 bpm  3x8    DL pogos at 120 and 150 bpm  30 reps at each    SL pogos at 120 and 150 bpm  30 reps each leg    Walk to jog program- phase 1, 3 rounds through      Vaso to R ankle med compression, 10 minutes      Assessment    Maribell Rosales is progressing towards their goals as evidenced by compliance with HEP, continuing through walk to jog program without any issues or increased pain.  Today's treatment was progressed by assessing walk to jog program, adding more advanced SL balance dynamic tasks as well as re assessing jumping on jump - able to perform SL jumps today without pain in ankle.  Manual/Verbal/Tactile cues provided to ensure proper understanding and implementation of walk to jog program, understanding soreness rules and assessing for any increasing effusion.  Able to successfully get through 3 rounds of first phase today without any reactive pain or effusion.   Patient would continue to benefit from skilled PT to address remaining functional, objective and subjective deficits to allow them to return to full independence with ADLs and iADLs.       Plan: Assess response to walk to jog program, plyos, ready to DC next few  sessions.      Laine Clay, PT

## 2025-01-23 ENCOUNTER — TREATMENT (OUTPATIENT)
Dept: PHYSICAL THERAPY | Facility: HOSPITAL | Age: 20
End: 2025-01-23
Payer: COMMERCIAL

## 2025-01-23 DIAGNOSIS — S93.431A ANKLE SYNDESMOSIS DISRUPTION, RIGHT, INITIAL ENCOUNTER: ICD-10-CM

## 2025-01-23 DIAGNOSIS — M25.571 ACUTE RIGHT ANKLE PAIN: Primary | ICD-10-CM

## 2025-01-23 PROCEDURE — 97110 THERAPEUTIC EXERCISES: CPT | Mod: GP

## 2025-01-23 NOTE — PROGRESS NOTES
Physical Therapy  Physical Therapy Treatment Note    Patient Name: Maribell Rosales  MRN: [id number]  Today's Date: 1/23/2025  Time Calculation  Start Time: 0830  Stop Time: 0920  Time Calculation (min): 50 min    Insurance:  Visit number: 5 of 20  Authorization info: hard limit of 20  Insurance Type: [facility]    General:  Reason for visit: s/p right ankle arthroscopy with extensive debridement and syndesmotic stabilization from 08/01/2024   Referred by: Dr. Washington      Current Problem:  No diagnosis found.      Procedure: s/p right ankle arthroscopy with extensive debridement and syndesmotic stabilization from 08/01/2024          Medical History Form: Reviewed (scanned into chart)    Subjective:     Visit #25 (5/20 new year): Maribell states she felt really good after last session, working through phase 2 of walk to jog program, denies pain in ankle just fatigue with this. But overall reports it feels great.       Pain:  0/10 around the ankle    Red Flags: Do you have any of the following? No  Fever/chills, unexplained weight changes, dizziness/fainting, unexplained change in bowel or bladder functions, unexplained malaise or muscle weakness, night pain/sweats, numbness or tingling    Objective:  Objective     Assessment for running:  SL Calf raise (front leg on bench, back leg slightly extended) 30 bpm  3x8    SL calf raise (front leg on bench, back leg slightly extended) 120 bpm  3x8    DL pogos at 120 and 150 bpm  30 reps at each    SL pogos at 120 and 150 bpm  30 reps each leg          ROM          Ankle AROM (Degrees)      (R)  (L)  Plantarflexion: 70  70    Dorsiflexion: 10  10    Inversion: 30  45     Eversion: 12  15         Ankle PROM (Degrees)      (R)  (L)  Plantarflexion: 55       Dorsiflexion: 5      Inversion:        Eversion:                Strength Testing    Ankle    (R)  (L)  Plantarflexion: 3  5      Dorsiflexion: 3  5    Inversion: nt  5     Eversion: nt  5    Force Frame Testing 12/6/24    Supine Ankle  "PF at 0 degrees DF:    R: 233, 202, 229 av N (49 pounds)  L: 229, 237, 264 av (54 pounds)  LSI: 90%    Supine ankle PF at 20 deg ankle PF:  R: 228, 220, 227 Av (50 pounds)  L: 213, 212, 223 Av (48 pounds)  LSI: 96%    Metronome heel raise (100 bpm):  R: 6  L: 12  LSI: 50%        Palpation: no pain with palpation, slight reduced sensation over surgical incisions      Ankle/Foot Joint Mobility: hypomobile R talocrural joint    CKC DF  R: 4 inches  L: 6 inches      Effusion: 48.5 cm (figure 8)      Outcome Measures:        EDUCATION: Home exercise program, plan of care, activity modifications, pain management, and injury pathology       Goals: Set and discussed today  Active       PT Problem       Increase R ankle AROM to 15-20 degrees DF, 60-70 degrees PF and 20 degrees EV.        Start:  24    Expected End:  24            Increase R ankle MMT to 5/5 throughout and improve R single heel raise to at least 4\".       Start:  24    Expected End:  24            Increase length in R gastrocs to 10 degrees.       Start:  24    Expected End:  24            Pt will amb with proper HS/TO pattern and equal stance time/step length to restore normal gait pattern.       Start:  24    Expected End:  24            Pt will maintain R SLS for 30 seconds on firm surface without LOB.       Start:  24    Expected End:  24            Pt will report 0/10 R ankle pain to improve prolonged standing and walking, and allow return to exercise classes.       Start:  24    Expected End:  24            Improve LEFS by at least 9 points (MDIC).       Start:  24    Expected End:  24                Plan of care was developed with input and agreement by the patient    Treatment Performed:      Therapeutic Exercise:    45 min    Elliptical 5 minutes  Calf stretch/PF stretch on wedge 3x30 sec hold  Foam rolling calves 15x  Side steps/monster walks " green band 2x10 around toebox  Single leg fire hydrant 3x10 each leg green band  SL heel raise on wedge 4x8  SL balance with rotation and 4# MB toss  SL balance on airex pad with UE alpahbet  SL balance on airex pad with paloff (orange band press) 20 reps each  SL balance with upper body chop on inverted wedge 20x each direction  SL skater hops 2x10  DL broad jumps 10x  SL jump to DL land 5x each  Plyos on jump  level 4  DL 3x10  SL alternating 3x10  SL to SL 3x10    Not today:  Assessment for running:  SL Calf raise (front leg on bench, back leg slightly extended) 30 bpm  3x8    SL calf raise (front leg on bench, back leg slightly extended) 120 bpm  3x8    DL pogos at 120 and 150 bpm  30 reps at each    SL pogos at 120 and 150 bpm  30 reps each leg    Walk to jog program- phase 1, 3 rounds through      Vaso to R ankle med compression, 10 minutes      Assessment    Maribell Rosales is progressing towards their goals as evidenced by compliance with HEP, continuing through walk to jog program without any issues or increased pain.  Today's treatment was progressed by continuing to assess walk to jog program, adding more advanced SL balance dynamic tasks as well as re assessing jumping on jump - able to perform SL jumps today without pain in ankle and overall improved confidence  Manual/Verbal/Tactile cues provided to ensure proper understanding and implementation of walk to jog program, understanding soreness rules and assessing for any increasing effusion.  Able to successfully get through 3 rounds of first phase today without any reactive pain or effusion.   Patient would continue to benefit from skilled PT to address remaining functional, objective and subjective deficits to allow them to return to full independence with ADLs and iADLs.       Plan: Assess response to walk to jog program progression- likely DC next session.      Laine Clay, PT

## 2025-01-30 ENCOUNTER — APPOINTMENT (OUTPATIENT)
Dept: PHYSICAL THERAPY | Facility: HOSPITAL | Age: 20
End: 2025-01-30
Payer: COMMERCIAL

## 2025-02-12 ENCOUNTER — HOSPITAL ENCOUNTER (OUTPATIENT)
Dept: RADIOLOGY | Facility: CLINIC | Age: 20
Discharge: HOME | End: 2025-02-12
Payer: COMMERCIAL

## 2025-02-12 ENCOUNTER — OFFICE VISIT (OUTPATIENT)
Dept: ORTHOPEDIC SURGERY | Facility: CLINIC | Age: 20
End: 2025-02-12
Payer: COMMERCIAL

## 2025-02-12 DIAGNOSIS — S93.431A ANKLE SYNDESMOSIS DISRUPTION, RIGHT, INITIAL ENCOUNTER: ICD-10-CM

## 2025-02-12 PROCEDURE — 99213 OFFICE O/P EST LOW 20 MIN: CPT | Performed by: STUDENT IN AN ORGANIZED HEALTH CARE EDUCATION/TRAINING PROGRAM

## 2025-02-12 PROCEDURE — 1036F TOBACCO NON-USER: CPT | Performed by: STUDENT IN AN ORGANIZED HEALTH CARE EDUCATION/TRAINING PROGRAM

## 2025-02-12 PROCEDURE — 73610 X-RAY EXAM OF ANKLE: CPT | Mod: RT

## 2025-02-12 ASSESSMENT — PAIN - FUNCTIONAL ASSESSMENT: PAIN_FUNCTIONAL_ASSESSMENT: 0-10

## 2025-02-12 ASSESSMENT — PAIN SCALES - GENERAL: PAINLEVEL_OUTOF10: 4

## 2025-02-12 NOTE — PROGRESS NOTES
ORTHOPAEDIC SURGERY PROGRESS NOTE    Chief Complaint:  Right ankle pain    History Of Present Illness  Maribell Rosales is a 19 y.o. female who presents for evaluation of right ankle pain.  Patient reports the atraumatic onset of right ankle pain while walking approximately 6 weeks ago.  Patient denies specific injury or change in her activity or shoewear.  Pain is made worse with walking and standing.  She has never had a pain like this in the past.  She has tried dry needling as well as over-the-counter bracing and limiting her activity with persistent pain.  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  This has been difficult for her as well is working out.  She denies any associated numbness, tingling or weakness.    04/25/2024: Patient returns for follow-up of her right ankle injury.  She has been in physical therapy and noting improvements with respect to her instability.  She reports that overall she is improving but continues with moderate pain in her ankle rated as 5 out of 10.  She has not had any recurrent brendon instability events.    06/11/2024: Patient returns for follow-up of her right ankle injury.  Patient continues with 8 out of 10 pain in her right ankle.  She is present with her mom today.  She reports several days of near complete pain relief following previous right ankle corticosteroid injection.  Unfortunately, she has continued with relatively severe and debilitating pain in her ankle.  She does notice a radiating pain that goes into her foot, this is reportedly about 30% of her overall pain.  Her more typical pain is on the outside of her ankle.  She has been unable to return to running due to pain.  She is obviously quite frustrated by this process.    08/14/2024: Patient returns s/p right ankle arthroscopy with extensive debridement and syndesmotic stabilization from 08/01/2024.  Patient is currently reporting 2 out of 10 pain that is gradually improving.  She  reports that the majority of her preoperative pain has actually resolved.  Patient has been compliant with nonweightbearing restrictions in a short leg fiberglass splint.  She is present with her mother today.    08/27/2024: Patient returns s/p right ankle arthroscopy with extensive debridement and syndesmotic stabilization from 08/01/2024.  Patient is currently reporting 2 out of 10 pain that is gradually improving.  She continues to note significant improvement from her preoperative state.  She has been ambulating with use of crutches and continued on aspirin.  She reports that pain at this point feels surgical.  She is present with her mother today.     09/20/2024: Patient returns s/p right ankle arthroscopy with extensive debridement and syndesmotic stabilization from 08/01/2024.  Patient has been in physical pain that is gradually proving.  She has not had any recurrence of events.  She has continued in physical therapy and is utilizing a lace up style ankle brace.  She has begun training with the [employer].  She denies any new trauma or swelling.    11/12/2024: Patient returns s/p right ankle arthroscopy with extensive debridement and syndesmotic stabilization from 08/01/2024.  Patient is currently reporting no pain.  She has been working with physical therapy.  She had sudden worsening of her pain by both discussion with the patient as well as EMR review following a trip to [facility] including a wedding.  This has resolved.    02/12/25: Patient returns s/p right ankle arthroscopy with extensive debridement and syndesmotic stabilization from 08/01/2024.  Patient has been recovering well.  She has noticed some increased pain due to the cold weather.  She has returned to running.  She believes she overdid it while running recently.  She is continuing physical therapy.  She is quite pleased with her progress to date.    Past Medical History  Past Medical History:   Diagnosis Date    Allergy to milk products      Anemia     GERD (gastroesophageal reflux disease)     Unspecified asthma, uncomplicated ([facility])     Childhood asthma - stable       Surgical History  Recent Surgeries in Orthopaedic Surgery            No cases to display             Social History  Social History     Socioeconomic History    Marital status: Single   Tobacco Use    Smoking status: Never     Passive exposure: Never    Smokeless tobacco: Never   Vaping Use    Vaping status: Never Used   Substance and Sexual Activity    Alcohol use: Never    Drug use: Never    Sexual activity: Defer       Family History  Family History   Problem Relation Name Age of Onset    No Known Problems Mother      No Known Problems Father      No Known Problems Brother          Allergies  Allergies   Allergen Reactions    Milk Containing Products (Dairy) Diarrhea       Review of Systems  REVIEW OF SYSTEMS  Constitutional: no unplanned weight loss  Psychiatric: no suicidal ideation  ENT: no vision changes, no sinus problems  Pulmonary: no shortness of breath  Lymphatic: no enlarged lymph nodes  Cardiovascular: no chest pain or shortness of breath  Gastrointestinal: no stomach problems  Genitourinary: no dysuria   Skin: no rashes  Endocrine: no thyroid problems  Neurological: no headache, no numbness  Hematological: no easy bruising  Musculoskeletal: Right ankle pain    Physical Exam  PHYSICAL EXAMINATION  Constitutional Exam: well developed and well nourished  Psychiatric Exam: alert and oriented, appropriate mood and behavior  Eye Exam: EOMI  Pulmonary Exam: breathing non-labored, no apparent distress  Lymphatic exam: no appreciable lymphadenopathy in the lower extremities  Cardiovascular exam: RRR to peripheral palpation, DP pulses 2+, PT 2+, toes are pink with good capillary refill, no pitting edema  Skin exam: no open lesions, rashes, abrasions or ulcerations  Neurological exam: sensation to light touch intact in both lower extremities in peripheral and dermatomal  distributions (except for any abnormalities noted in musculoskeletal exam)    Musculoskeletal exam: Right lower extremity examination.  Patient ankle incisions well-appearing, nontender to palpation overlying the plate and cortical buttons medially. She has sensation intact light touch grossly in a saphenous, sural, superficial peroneal, deep peroneal and tibial nerve distribution.  She has 5 out of 5 strength in plantarflexion, dorsiflexion and EHL.  She is 2+ DP/PT pulse palpated.  Patient has a negative anterior drawer, negative talar tilt, negative dorsiflexion external rotation stress test.    Last Recorded Vitals  There were no vitals taken for this visit.    Laboratory Results    No results found for this or any previous visit (from the past 24 hours).    Radiology Results   X-ray imaging 3 view weightbearing right ankle reviewed from 02/12/2025 demonstrates maintained position alignment following divergent flexible syndesmotic fixation with lateral plate and medial cortical buttons.  No increased distal tib-fib clear space, ankle mortise appears congruent.    Assessment/Plan:  19-year-old female s/p right ankle arthroscopy with extensive debridement and syndesmotic stabilization from 08/01/2024 who presents with both clinical and radiographic evidence of healing.  I would recommend the patient continue weightbearing to her tolerance in her right lower extremity.  I will continue to have no formal restrictions for her.  She may continue in physical therapy.  I reviewed that if she were to develop increased irritation from the hardware that certainly consideration could be made for hardware removal.  I would plan to see the patient back for 1 year follow-up.  Upon return, patient require three-view weightbearing right ankle.    Antwon Washington MD, BINTA  Department of Orthopaedic Surgery  [facility]

## 2025-02-19 ENCOUNTER — APPOINTMENT (OUTPATIENT)
Dept: INTEGRATIVE MEDICINE | Facility: CLINIC | Age: 20
End: 2025-02-19
Payer: COMMERCIAL

## 2025-02-19 VITALS
DIASTOLIC BLOOD PRESSURE: 65 MMHG | BODY MASS INDEX: 23.56 KG/M2 | HEIGHT: 64 IN | SYSTOLIC BLOOD PRESSURE: 93 MMHG | WEIGHT: 138 LBS | OXYGEN SATURATION: 99 % | HEART RATE: 76 BPM

## 2025-02-19 DIAGNOSIS — F41.9 ANXIETY: ICD-10-CM

## 2025-02-19 DIAGNOSIS — K21.9 GASTROESOPHAGEAL REFLUX DISEASE WITHOUT ESOPHAGITIS: Primary | ICD-10-CM

## 2025-02-19 PROCEDURE — 99214 OFFICE O/P EST MOD 30 MIN: CPT | Performed by: INTERNAL MEDICINE

## 2025-02-19 ASSESSMENT — ENCOUNTER SYMPTOMS
ABDOMINAL PAIN: 1
DIFFICULTY URINATING: 0
APPETITE CHANGE: 0
DYSURIA: 0
ARTHRALGIAS: 0
FATIGUE: 1
ABDOMINAL DISTENTION: 1
MYALGIAS: 0
SLEEP DISTURBANCE: 0
COUGH: 0
DIARRHEA: 0
WEAKNESS: 0
SHORTNESS OF BREATH: 0
HEADACHES: 0
NAUSEA: 1
BACK PAIN: 0
NERVOUS/ANXIOUS: 1
CONSTIPATION: 0
FEVER: 0

## 2025-02-19 NOTE — PROGRESS NOTES
"Integrative Medicine Follow-up Visit :     Subjective   Patient ID: Maribell Rosales is a 19 y.o. female who presents for Follow-up       HPI  Stomach is better but not where it used to be.   Has nausea every night. Having regular bowel movements 2-3 per day. Drinking two cups.   Getting a lot of headaches recently.   She is exercising regularly. Getting five work outs per week.  She is going to bed at 11:30 and waking around 7:30.   Spending a lot of time on social media.  School is stressful.   She increased her vitamin d.      Lab Results   Component Value Date    TSH 1.74 04/28/2023      Pain:minimal ankle pain. In PT.     Review of Systems   Constitutional:  Positive for fatigue. Negative for appetite change and fever.   HENT:  Negative for congestion and hearing loss.    Eyes:  Negative for visual disturbance.   Respiratory:  Negative for cough and shortness of breath.    Cardiovascular:  Negative for chest pain and leg swelling.   Gastrointestinal:  Positive for abdominal distention, abdominal pain (hx of severe painful reflux but none now) and nausea. Negative for constipation and diarrhea.   Genitourinary:  Negative for difficulty urinating, dysuria and urgency.   Musculoskeletal:  Negative for arthralgias, back pain and myalgias.   Skin:  Negative for rash.   Neurological:  Negative for weakness and headaches.   Psychiatric/Behavioral:  Negative for behavioral problems and sleep disturbance. The patient is nervous/anxious (well controlled but hx of anxiety).                   Objective   BP 93/65 (BP Location: Left arm, Patient Position: Sitting, BP Cuff Size: Adult)   Pulse 76   Ht 1.626 m (5' 4\")   Wt 62.6 kg (138 lb)   SpO2 99%   BMI 23.69 kg/m²     Physical Exam  HENT:      Head: Normocephalic and atraumatic.      Mouth/Throat:      Mouth: Mucous membranes are moist.   Cardiovascular:      Rate and Rhythm: Normal rate and regular rhythm.      Heart sounds: Normal heart sounds.   Pulmonary:      " Effort: Pulmonary effort is normal. No respiratory distress.      Breath sounds: No wheezing.   Chest:      Chest wall: No tenderness.   Abdominal:      General: Abdomen is flat. Bowel sounds are normal. There is no distension.      Palpations: Abdomen is soft. There is no mass.      Tenderness: There is no abdominal tenderness. There is no guarding.   Musculoskeletal:         General: No swelling or deformity.      Cervical back: Normal range of motion.   Skin:     General: Skin is dry.      Capillary Refill: Capillary refill takes less than 2 seconds.      Coloration: Skin is not pale.      Findings: No rash.   Neurological:      General: No focal deficit present.      Mental Status: She is alert and oriented to person, place, and time.   Psychiatric:         Mood and Affect: Mood normal.         Thought Content: Thought content normal.      Comments: Normal affect                      Assessment/Plan     Problem List Items Addressed This Visit             ICD-10-CM    Anxiety F41.9    Relevant Orders    PTH, intact    Calcium    GE reflux - Primary K21.9    Relevant Orders    PTH, intact    Calcium     Follow up with GI. Sounds like residual stomach issues may be related to eating dinner too close to bedtime and continues stress as a student.   She spends a lot of time on social media. Discussed relationship between anxiety and social media use. [place] with less.   Check PTH to screen for hyperparathyroidism as a rare cuase of both GE Reflux and anxiety.   Try gteting more sleep.   Recommend Follow up in : 2 months.     Try to go to bed before 11 pm.   Limit social media to no more than once per day (ideally once per week.)  See the GI  Switch to soy milk for your coffee.  Add hemp seeds to your breakfast cookies.   Send me a message about your social media use in a week.   Buildt to move by Hung  Follow up in April.   Uma Shepard MD PhD    Time Spent  Prep time on day of patient encounter: 3  minutes  Time spent directly with patient, family or caregiver: 30 minutes  Additional Time Spent on Patient Care Activities: 0 minutes  Documentation Time: 5 minutes  Other Time Spent: 0 minutes  Total: 38 minutes

## 2025-02-19 NOTE — PATIENT INSTRUCTIONS
Try to go to bed before 11 pm.   Limit social media to no more than once per day (ideally once per week.)  See the GI  Switch to soy milk for your coffee.  Add hemp seeds to your breakfast cookies.   Send me a message about your social media use in a week.   Buildt to move by Hung  Follow up in April.   Uma Shepard MD PhD

## 2025-02-20 ENCOUNTER — TREATMENT (OUTPATIENT)
Dept: PHYSICAL THERAPY | Facility: HOSPITAL | Age: 20
End: 2025-02-20
Payer: COMMERCIAL

## 2025-02-20 DIAGNOSIS — S93.431A ANKLE SYNDESMOSIS DISRUPTION, RIGHT, INITIAL ENCOUNTER: ICD-10-CM

## 2025-02-20 DIAGNOSIS — S93.431A SPRAIN OF TIBIOFIBULAR LIGAMENT OF RIGHT ANKLE, INITIAL ENCOUNTER: ICD-10-CM

## 2025-02-20 DIAGNOSIS — M25.571 PAIN IN RIGHT ANKLE AND JOINTS OF RIGHT FOOT: ICD-10-CM

## 2025-02-20 DIAGNOSIS — M25.571 ACUTE RIGHT ANKLE PAIN: Primary | ICD-10-CM

## 2025-02-20 PROCEDURE — 97110 THERAPEUTIC EXERCISES: CPT | Mod: GP

## 2025-02-20 NOTE — PROGRESS NOTES
Physical Therapy  Physical Therapy Discharge Summary    Patient Name: Maribell Rosales  MRN: [id number]  Today's Date: 2/20/2025  Time Calculation  Start Time: 1105  Stop Time: 1140  Time Calculation (min): 35 min    Insurance:  Visit number: 6 of 20  Authorization info: hard limit of 20  Insurance Type: [facility]    General:  Reason for visit: s/p right ankle arthroscopy with extensive debridement and syndesmotic stabilization from 08/01/2024   Referred by: Dr. Washington      Current Problem:  1. Acute right ankle pain        2. Ankle syndesmosis disruption, right, initial encounter        3. Sprain of tibiofibular ligament of right ankle, initial encounter  Follow Up In Physical Therapy      4. Pain in right ankle and joints of right foot  Follow Up In Physical Therapy            Procedure: s/p right ankle arthroscopy with extensive debridement and syndesmotic stabilization from 08/01/2024          Medical History Form: Reviewed (scanned into chart)    Subjective:     Visit #26 (6/20 new year): Maribell states she has been running more, having a little more discomfort but otherwise doing well. Had follow up with surgeon which went well- repeat xrays all good. She is on phase 4 of walk to jog, walking 1, jogging 4, 25 min.      Pain:  0/10 around the ankle    Red Flags: Do you have any of the following? No  Fever/chills, unexplained weight changes, dizziness/fainting, unexplained change in bowel or bladder functions, unexplained malaise or muscle weakness, night pain/sweats, numbness or tingling    Objective:  Objective     Assessment for running:  SL Calf raise (front leg on bench, back leg slightly extended) 30 bpm  3x8    SL calf raise (front leg on bench, back leg slightly extended) 120 bpm  3x8    DL pogos at 120 and 150 bpm  30 reps at each    SL pogos at 120 and 150 bpm  30 reps each leg          ROM          Ankle AROM  "(Degrees)      (R)  (L)  Plantarflexion: 70  70    Dorsiflexion: 10  10    Inversion: 30  45     Eversion: 12  15         Ankle PROM (Degrees)      (R)  (L)  Plantarflexion: 55       Dorsiflexion: 5      Inversion:        Eversion:                Strength Testing    Ankle    (R)  (L)  Plantarflexion: 3  5      Dorsiflexion: 3  5    Inversion: nt  5     Eversion: nt  5    Force Frame Testing 24    Supine Ankle PF at 0 degrees DF:    R: 233, 202, 229 av N (49 pounds)  L: 229, 237, 264 av (54 pounds)  LSI: 90%    Supine ankle PF at 20 deg ankle PF:  R: 228, 220, 227 Av (50 pounds)  L: 213, 212, 223 Av (48 pounds)  LSI: 96%    Metronome heel raise (100 bpm):  R: 6  L: 12  LSI: 50%        Palpation: no pain with palpation, slight reduced sensation over surgical incisions      Ankle/Foot Joint Mobility: hypomobile R talocrural joint    CKC DF  R: 4 inches  L: 6 inches      Effusion: 48.5 cm (figure 8)      Outcome Measures:  Other Measures  Lower Extremity Funtional Score (LEFS): 80/80     EDUCATION: Home exercise program, plan of care, activity modifications, pain management, and injury pathology       Goals: Set and discussed today  Active       PT Problem       Increase R ankle AROM to 15-20 degrees DF, 60-70 degrees PF and 20 degrees EV.        Start:  24    Expected End:  24            Increase R ankle MMT to 5/5 throughout and improve R single heel raise to at least 4\".       Start:  24    Expected End:  24            Increase length in R gastrocs to 10 degrees.       Start:  24    Expected End:  24            Pt will amb with proper HS/TO pattern and equal stance time/step length to restore normal gait pattern.       Start:  24    Expected End:  24            Pt will maintain R SLS for 30 seconds on firm surface without LOB.       Start:  24    Expected End:  24            Pt will report 0/10 R ankle pain to improve prolonged " standing and walking, and allow return to exercise classes.       Start:  03/28/24    Expected End:  05/27/24            Improve LEFS by at least 9 points (MDIC).       Start:  03/28/24    Expected End:  05/27/24 2/20/2025-met      Plan of care was developed with input and agreement by the patient    Treatment Performed:      Therapeutic Exercise:    45 min    Elliptical 5 minutes  Calf stretch/PF stretch on wedge 3x30 sec hold  Foam rolling calves 15x  Side steps/monster walks green band 2x10 around toebox  Single leg fire hydrant 3x10 each leg green band  SL heel raise on wedge 4x8  Assessment of pogos, SL heel raise at 100 bpm        SL balance with rotation and 4# MB toss  SL balance on airex pad with UE alpahbet  SL balance on airex pad with paloff (orange band press) 20 reps each  SL balance with upper body chop on inverted wedge 20x each direction  SL skater hops 2x10  DL broad jumps 10x  SL jump to DL land 5x each  Plyos on jump  level 4  DL 3x10  SL alternating 3x10  SL to SL 3x10    Not today:  Assessment for running:  SL Calf raise (front leg on bench, back leg slightly extended) 30 bpm  3x8    SL calf raise (front leg on bench, back leg slightly extended) 120 bpm  3x8    DL pogos at 120 and 150 bpm  30 reps at each    SL pogos at 120 and 150 bpm  30 reps each leg    Walk to jog program- phase 1, 3 rounds through      Vaso to R ankle med compression, 10 minutes      Assessment    Maribell Rosales is almost through entire walk to jog program, not having any pain or discomfort or set backs. Feels 100% in terms of function. Demonstrates WNL strength, ROM and function. Demonstrates ability to perform SL heel raise with symmetrical heel height to non involved ankle. She has met all goals set forth in PT and feels she can be done with PT, which I agree with. Discussed importance of maintaining what we have worked on In PT to prevent regression. All questions answered prior to DC.     Plan:  LORRAINE Clay, PT

## 2025-03-04 ENCOUNTER — PATIENT MESSAGE (OUTPATIENT)
Dept: INTEGRATIVE MEDICINE | Facility: CLINIC | Age: 20
End: 2025-03-04
Payer: COMMERCIAL

## 2025-03-04 DIAGNOSIS — M25.871 ANKLE IMPINGEMENT SYNDROME, RIGHT: ICD-10-CM

## 2025-03-04 LAB
CALCIUM SERPL-MCNC: 9.1 MG/DL (ref 8.6–10.2)
PTH-INTACT SERPL-MCNC: 28 PG/ML (ref 16–77)

## 2025-03-05 RX ORDER — OMEPRAZOLE 20 MG/1
20 CAPSULE, DELAYED RELEASE ORAL
Qty: 90 CAPSULE | Refills: 1 | Status: SHIPPED | OUTPATIENT
Start: 2025-03-05 | End: 2025-09-01

## 2025-03-17 ENCOUNTER — OFFICE VISIT (OUTPATIENT)
Dept: GASTROENTEROLOGY | Facility: CLINIC | Age: 20
End: 2025-03-17
Payer: COMMERCIAL

## 2025-03-17 VITALS
WEIGHT: 145 LBS | DIASTOLIC BLOOD PRESSURE: 73 MMHG | BODY MASS INDEX: 25.69 KG/M2 | SYSTOLIC BLOOD PRESSURE: 128 MMHG | HEART RATE: 90 BPM | TEMPERATURE: 98.9 F | HEIGHT: 63 IN

## 2025-03-17 DIAGNOSIS — K21.9 GASTROESOPHAGEAL REFLUX DISEASE, UNSPECIFIED WHETHER ESOPHAGITIS PRESENT: Primary | ICD-10-CM

## 2025-03-17 PROCEDURE — 99214 OFFICE O/P EST MOD 30 MIN: CPT | Mod: GC | Performed by: STUDENT IN AN ORGANIZED HEALTH CARE EDUCATION/TRAINING PROGRAM

## 2025-03-17 PROCEDURE — 99214 OFFICE O/P EST MOD 30 MIN: CPT | Performed by: STUDENT IN AN ORGANIZED HEALTH CARE EDUCATION/TRAINING PROGRAM

## 2025-03-17 PROCEDURE — 3008F BODY MASS INDEX DOCD: CPT | Performed by: STUDENT IN AN ORGANIZED HEALTH CARE EDUCATION/TRAINING PROGRAM

## 2025-03-17 RX ORDER — FAMOTIDINE 40 MG/1
40 TABLET, FILM COATED ORAL NIGHTLY
Qty: 30 TABLET | Refills: 11 | Status: SHIPPED | OUTPATIENT
Start: 2025-03-17 | End: 2026-03-17

## 2025-03-17 RX ORDER — PANTOPRAZOLE SODIUM 40 MG/1
40 TABLET, DELAYED RELEASE ORAL
Qty: 30 TABLET | Refills: 3 | Status: SHIPPED | OUTPATIENT
Start: 2025-03-17 | End: 2025-07-15

## 2025-03-17 ASSESSMENT — PAIN SCALES - GENERAL: PAINLEVEL_OUTOF10: 0-NO PAIN

## 2025-03-17 NOTE — PATIENT INSTRUCTIONS
Thank you for seeing us in clinic today!     In summary, please do the following:  Take pantoprazole 40 mg 1 hour before dinner daily.   Take pepcid 40 mg at bedtime.     You have acid reflux. This is usually caused by inappropriate relaxation of the lower esophageal sphincter (exit point of the swallow pipe where it meets the stomach). Untreated long-standing acid injury can cause damage to the esophagus, ultimately leading to conditions like Grace's esophagus and cancer of the esophagus.    - Please try to adhere to the following lifestyle habits that can help mitigate acid reflux symptoms:  Avoid “trigger foods”, or food/drink that tend to precipitate acid reflux symptoms. Common offenders include alcohol, fatty/spicy meals, tomato sauce, chocolate, caffeinated beverages such as coffee and tea, carbonated beverages and peppermint.  Elevate the head of your bed to 45 degrees with a foam wedge or 2-3 pillows, especially if symptoms occur at night or early in the morning  Remain upright for at least 3 hours after meals  Avoid late night snacking  Make efforts to reduce overall stress and anxiety, if applicable    We will see you again in 2 months or sooner if needed.   If you have any questions or concerns, please call the office at [phone].

## 2025-03-17 NOTE — PROGRESS NOTES
REASON FOR VISIT:  stomach issues    HPI:  Maribell Rosales is a 19 y.o. female with pmhx of GERD, LORETTA, lactase/palatinase deficiency who presents for stomach issues.      Summary:  Last OV 8/2023. Presented for LORETTA (hb 11.9). Per mother, had GERD at a young age. Had UGI sx which resolved after stopping lactose. She has been seen by [facility] for nausea and reflux. Her sx have entirely resolved with vegan diet. S/p EGD 2018 without e/o celiac disease. Vegan however does eat a healthy diet, includes dark greens. Reassuring exam and history. Iincreased iron to bid and recheck labs in 3 months.    Seen by [facility] 2/19/2025 for anxiety and GERD, thought to be related to eating dinner close to bedtime and continued stress. Referred to GI.     Today, she reports her symptoms had completed resolved for two years ([id number]). Recently she has noticed worsening GERD, bloating, and nausea since 1/2025. Denies any changes in diet, medications, environment or stress. Only big change was ankle surgery. Has 2 episodes of GERD in daytime with accompanying nausea. No noctural symptoms however feels it in the morning. Started Prilosec 2 months ago bid, stopped two weeks ago (insurance issues). Took ppi WITH meals. No late night snacking. Food triggers include citrus, tomato sauce, bread, coffee. Stays upright after eating. Elevates with 2-3 pillows at night which helps. When GERD is under control, nausea is as well. Some abdominal pain, both sides, random, every two weeks. Denies dysphagia, regurgitation, change in bowel habits, early satiety, unintentional weight loss, fatigue, hematochezia, hematemesis, melena or fevers/chills. BMs are normal.     Cannot take ppi in the am due to lifestyle.     Med list notable for ferrous sulfate daily.    Labs notable for 1/20/25 neg h pylori stool antigen, Hb 12.8, Iron 78, Ferritin 39, B12 917, Vit D 20, celiac panel neg 7/2023.    No fhx of CRC. MGM with  pancreatic cancer. No gastric or esophageal cancer. No tobacco or alcohol use.     Social hx: Denies tobacco, alcohol, drug use.    Prev endoscopic eval:   EGD 2018: The examined esophagus was normal. Four biopsies were obtained with cold  forceps for histology in the lower third of the esophagus, as well as   two biopsies in the middle third of the esophagus.   The entire examined stomach was normal. Two biopsies were obtained with   cold forceps for histology in the gastric body, as well as two biopsies   in the gastric antrum.    The examined duodenum was normal. Two biopsies were obtained with cold   forceps for histology in the second portion of the duodenum, as well as   two biopsies in the duodenal bulb. This was biopsied with a cold forceps   for evaluation of disaccharidase deficiency  Path: A. DUODENUM, SECOND PORTION, BIOPSY:  -- DUODENAL MUCOSA, WITHIN NORMAL LIMITS.     B. DUODENAL BULB, BIOPSY:  -- DUODENAL MUCOSA, WITHIN NORMAL LIMITS.     C. STOMACH, BIOPSY:  -- GASTRIC ANTRAL AND OXYNTIC-TYPE MUCOSA, WITHIN NORMAL LIMITS.  -- NO HELICOBACTER PYLORI ORGANISMS IDENTIFIED ON H T E STAIN.     D. ESOPHAGUS, DISTAL, BIOPSY:  -- SQUAMOUS MUCOSA, WITHIN NORMAL LIMITS.     E. ESOPHAGUS, MID, BIOPSY:  -- SQUAMOUS MUCOSA, WITHIN NORMAL LIMITS.    REVIEW OF SYSTEMS  CONSTITUTIONAL: Negative for fevers  HEENT: Negative for frequent/significant headaches  RESPIRATORY: Negative for hemoptysis  CARDIOVASCULAR: Negative for chest pain  GI: See HPI  : Negative for hematuria  MUSCULOSKELETAL: Negative for arthralgia or myalgia  INTEGUMENTARY/SKIN: Negative for rash or skin lesion  HEMATOLOGY/LYMPHOLOGY: Negative for prolonged bleeding  ENDOCRINE: Negative for cold/heat intolerance  NEURO: Negative for encephalopathy  PSYCH: Negative for new changes in mood or affect    Allergies   Allergen Reactions    Milk Containing Products (Dairy) Diarrhea       Past Medical History:   Diagnosis Date    Allergy to milk products   "   Anemia     GERD (gastroesophageal reflux disease)     Unspecified asthma, uncomplicated ([facility])     Childhood asthma - stable       Past Surgical History:   Procedure Laterality Date    UPPER GASTROINTESTINAL ENDOSCOPY      WISDOM TOOTH EXTRACTION         Family History   Problem Relation Name Age of Onset    No Known Problems Mother      No Known Problems Father      No Known Problems Brother         Social History     Tobacco Use    Smoking status: Never     Passive exposure: Never    Smokeless tobacco: Never   Substance Use Topics    Alcohol use: Never       Current Outpatient Medications   Medication Sig Dispense Refill    cholecalciferol (Vitamin D-3) 50 mcg (2,000 unit) capsule Take 5,000 Units by mouth once daily.      cyanocobalamin, vitamin B-12, 5,000 mcg tablet, sublingual Place 1 tablet under the tongue once daily.      ergocalciferol (Vitamin D-2) 1.25 MG (36788 UT) capsule Take 4 capsules (5,000 mcg) by mouth 1 (one) time per week.      escitalopram (Lexapro) 20 mg tablet Take 25 mg by mouth once daily.      ferrous sulfate 325 (65 Fe) MG EC tablet Take 65 mg by mouth 3 times a day with meals. Do not crush, chew, or split.      omega-3 acid ethyl esters (Lovaza) 1 gram capsule Take 1 capsule (1 g) by mouth 2 times a day.      omeprazole (PriLOSEC) 20 mg DR capsule Take 1 capsule (20 mg) by mouth once daily in the morning. Take before meals. Do not crush or chew. 90 capsule 1    omeprazole OTC (PriLOSEC OTC) 20 mg EC tablet Take 1 tablet (20 mg) by mouth once daily. Do not crush, chew, or split. 60 tablet 1    -iron fum-folic acid (Prenatal 19) 29 mg iron- 1 mg tablet Take by mouth.      chlorhexidine (Peridex) 0.12 % solution 15 ml swish and spit for 30 seconds night prior to surgery and morning of surgery 473 mL 0     No current facility-administered medications for this visit.       PHYSICAL EXAM:  /73   Pulse 90   Temp 37.2 °C (98.9 °F) (Temporal)   Ht 1.6 m (5' 3\")   Wt 65.8 " "kg (145 lb)   BMI 25.69 kg/m²      Gen: aaox3, NAD  Head: Normocephalic, atraumatic  Eyes: Sclera anicteric, conjunctiva pink   Neck: Supple  Heart: RRR, no murmurs, rubs or gallops  Lungs: CTAB, non-labored breathing  Abd: Soft, non-distended, non-tender, bowel sounds present, no rebound or guarding, no organomegaly  Ext: No LE edema b/l  Neuro: no gross focal deficits  Psych: Congruent mood and affect, appropriate insight and judgement  Skin: No jaundice              No lab exists for component: \"BILIT\"      No results found.    Lab Results   Component Value Date    WBC 6.3 01/14/2025    HGB 12.8 01/14/2025    MCV 94 01/14/2025     01/14/2025     Lab Results   Component Value Date     04/28/2023    K 4.1 04/28/2023     04/28/2023    CO2 25 04/28/2023    BUN 7 04/28/2023    CREATININE 0.45 (L) 04/28/2023    CREATININE 0.47 (L) 01/07/2021     Lab Results   Component Value Date    ALT 11 04/28/2023    AST 14 04/28/2023    ALKPHOS 43 04/28/2023     No results found for: \"TBILIHCVFS\", \"BILIDIR\"           ASSESSMENT  GERD  LORETTA, resolved    Recent recurrence of bloating, GERD, and nausea for 2 months, unclear trigger. Difficult to assess response to ppi as she was taking ppi with meals. Prior testing for HP negative. Nausea most likely related to GERD as resolves when GERD is under control, can consider RUQ us if no improvement. EGD 2018 as above.  No alarm sx.     PLAN  --educated on appropriate timing of ppi  --given her lifestyle, will start ppi 1 hour before dinner daily (difficult to do in am before breakfast)  --pepcid qhs  --antireflux measures (avoid trigger foods, bed elevation, upright after meals, avoid late night snacking)  --consider RUQ us if no improvement in nausea  --HP testing previously done and negative    FU 2 months    HM  Colonoscopy: n/a  Grace's esophagus screening: n/a  HCV: none found prior, discuss at fu    Date: 3/17/2025  Time: 9:20 AM     Jesi Beck, " MD

## 2025-03-25 ENCOUNTER — APPOINTMENT (OUTPATIENT)
Dept: INTEGRATIVE MEDICINE | Facility: CLINIC | Age: 20
End: 2025-03-25
Payer: COMMERCIAL

## 2025-03-25 DIAGNOSIS — M99.03 SEGMENTAL AND SOMATIC DYSFUNCTION OF LUMBAR REGION: ICD-10-CM

## 2025-03-25 DIAGNOSIS — M54.2 DORSALGIA OF CERVICOTHORACIC REGION: ICD-10-CM

## 2025-03-25 DIAGNOSIS — M54.59 MECHANICAL LOW BACK PAIN: ICD-10-CM

## 2025-03-25 DIAGNOSIS — M99.04 SEGMENTAL AND SOMATIC DYSFUNCTION OF SACRAL REGION: ICD-10-CM

## 2025-03-25 DIAGNOSIS — M54.6 DORSALGIA OF CERVICOTHORACIC REGION: ICD-10-CM

## 2025-03-25 DIAGNOSIS — M79.9 POSTURAL STRAIN: ICD-10-CM

## 2025-03-25 DIAGNOSIS — M99.02 SEGMENTAL AND SOMATIC DYSFUNCTION OF THORACIC REGION: ICD-10-CM

## 2025-03-25 DIAGNOSIS — M99.01 SEGMENTAL AND SOMATIC DYSFUNCTION OF CERVICAL REGION: Primary | ICD-10-CM

## 2025-03-25 DIAGNOSIS — M89.8X1 PERISCAPULAR PAIN: ICD-10-CM

## 2025-03-25 PROCEDURE — 97112 NEUROMUSCULAR REEDUCATION: CPT | Performed by: CHIROPRACTOR

## 2025-03-25 PROCEDURE — 98941 CHIROPRACT MANJ 3-4 REGIONS: CPT | Performed by: CHIROPRACTOR

## 2025-03-25 NOTE — PROGRESS NOTES
Subjective   Patient ID: Maribell Rosales is a 20 y.o. female who presents March 25, 2025 for chiropractic care.    (1/20) [facility] ED:     Today, the patient rates their degree of pain as a 5 out of 10 on the numeric pain rating scale.     Maribell returns for continued chiropractic care. She reports that she has been doing well. She states that her ankle rehabilitation has been going well. However, she feels like she is slightly off since the surgery. The patient denies any changes in health since her last encounter and will follow up as scheduled.   ________________________________________  Initial exam:   Maribell Rosales presents for evaluation and treatment of left sided scapular pain and low back pain. She states that her left sided scapular pain started in mid-January. She denies any traumas, accidents, incidents, or injuries. She states that she has noticed a significant loss of strength in the left arm when compared to the right. She states that she predominately notices this decreased strength and some pain when performing lateral raises with weight. She reports that her secondary complaint of low back pain. She states that these symptoms have been present for years and occur intermittently. She states that standing for extended periods of time will aggravate her symptoms, causing a diffuse bilateral discomfort parallel to her belt line. She denies any radiating symptoms.     Maribell also reports a recent ankle injury. She states that that she was walking and suddenly felt pain along the the medial plantar surface of the foot, along the medial aspect of the achilles tendon and the lateral aspect of the achilles tendon. She states that her range of motion was decreased, she notes pain with palpation and difficulty with weight bearing movements. She denies any swelling or bruising.      Objective   Physical Exam  Neurological:      General: No focal deficit present.      Mental Status: She is alert and  oriented to person, place, and time.      Cranial Nerves: No dysarthria or facial asymmetry.      Sensory: Sensation is intact.      Motor: Motor function is intact.      Coordination: Coordination is intact.      Gait: Gait is intact.       Palpation of the following regions revealed:  Cervical: Upper trapezius bilateral, hypertonicity and tenderness.  Levator scapulae bilateral, hypertonicity and tenderness.  Cervical paraspinals bilateral, hypertonicity and tenderness.  Thoracic: Thoracic paraspinals bilateral, hypertonicity and tenderness.  Middle trapezius bilateral, hypertonicity and tenderness.  Rhomboids bilateral, hypertonicity and tenderness.  Lumbar: Lumbar paraspinals bilateral, hypertonicity and tenderness.  Quadratus lumborum bilateral, hypertonicity and tenderness.  Thoracolumbar fascia bilateral, muscular hypertonicity.    Segmental Joint(s): Segmental joint dysfunction was assessed with motion palpation and is identified in the following areas:  Cervical : C2 C4  Thoracic : T4, T5, T6, and T8  Lumbopelvic / Sacral SIJ : L4, L5/S1, and L SIJ    Assessment/Plan   Today's Treatment Included: Spinal manipulation to the following regions of segmental dysfunction identified on examination, using age-appropriate and injury specific force. Segmental Joint(s) Cervical : C2 C5 C6 Segmental Joint(s) Thoracic : T4, T5, T6, and T8 Segmental Joint(s) Lumbopelvic/Sacral SIJ : L4, L5/S1, R SIJ, and L SIJ  Chiropractic manipulation treatment techniques utilized: Diversified CMT, Pelvic drop table technique, and Flexion-distraction technique  Soft tissue mobilization techniques utilized during treatment: Cupping  Integrative Dry Needling (IDN) - Needles in/out:  14.  Neuromuscular Re-Education (03558): 2 Units; Start time: 9:10a  End time: 9:40a      Soft-tissue mobilization was performed in the following areas:  Cervical paraspinal mm. bilateral, Upper Trapezius bilateral, and Levator Scap. bilateral  Thoracic  Paraspinal mm. bilateral, Middle Trapezius bilateral, Lower Trapezius bilateral, and Rhomboids bilateral  Lumbar Paraspinal mm. bilateral, Quadratus Lumborum bilateral, and Thoracolumbar Fascia bilateral    Recommended follow-up in 1 month(s).     The patient tolerated today's treatment with little or no additional discomfort and was instructed to contact the office for questions or concerns.

## 2025-03-27 ENCOUNTER — APPOINTMENT (OUTPATIENT)
Dept: PRIMARY CARE | Facility: CLINIC | Age: 20
End: 2025-03-27
Payer: COMMERCIAL

## 2025-04-01 ENCOUNTER — APPOINTMENT (OUTPATIENT)
Dept: INTEGRATIVE MEDICINE | Facility: CLINIC | Age: 20
End: 2025-04-01
Payer: COMMERCIAL

## 2025-04-08 ENCOUNTER — APPOINTMENT (OUTPATIENT)
Dept: INTEGRATIVE MEDICINE | Facility: CLINIC | Age: 20
End: 2025-04-08
Payer: COMMERCIAL

## 2025-04-09 ENCOUNTER — PATIENT MESSAGE (OUTPATIENT)
Dept: INTEGRATIVE MEDICINE | Facility: CLINIC | Age: 20
End: 2025-04-09
Payer: COMMERCIAL

## 2025-04-16 ENCOUNTER — APPOINTMENT (OUTPATIENT)
Dept: INTEGRATIVE MEDICINE | Facility: CLINIC | Age: 20
End: 2025-04-16
Payer: COMMERCIAL

## 2025-04-22 ENCOUNTER — APPOINTMENT (OUTPATIENT)
Dept: INTEGRATIVE MEDICINE | Facility: CLINIC | Age: 20
End: 2025-04-22
Payer: COMMERCIAL

## 2025-04-22 DIAGNOSIS — M54.2 DORSALGIA OF CERVICOTHORACIC REGION: ICD-10-CM

## 2025-04-22 DIAGNOSIS — M54.6 DORSALGIA OF CERVICOTHORACIC REGION: ICD-10-CM

## 2025-04-22 DIAGNOSIS — M79.9 POSTURAL STRAIN: ICD-10-CM

## 2025-04-22 DIAGNOSIS — M99.01 SEGMENTAL AND SOMATIC DYSFUNCTION OF CERVICAL REGION: Primary | ICD-10-CM

## 2025-04-22 DIAGNOSIS — M99.02 SEGMENTAL AND SOMATIC DYSFUNCTION OF THORACIC REGION: ICD-10-CM

## 2025-04-22 DIAGNOSIS — M54.59 MECHANICAL LOW BACK PAIN: ICD-10-CM

## 2025-04-22 DIAGNOSIS — M99.03 SEGMENTAL AND SOMATIC DYSFUNCTION OF LUMBAR REGION: ICD-10-CM

## 2025-04-22 DIAGNOSIS — M99.04 SEGMENTAL AND SOMATIC DYSFUNCTION OF SACRAL REGION: ICD-10-CM

## 2025-04-22 PROCEDURE — 98941 CHIROPRACT MANJ 3-4 REGIONS: CPT | Performed by: CHIROPRACTOR

## 2025-04-22 PROCEDURE — 97112 NEUROMUSCULAR REEDUCATION: CPT | Performed by: CHIROPRACTOR

## 2025-04-22 NOTE — PROGRESS NOTES
Subjective   Patient ID: Maribell Rosales is a 20 y.o. female who presents April 22, 2025 for chiropractic care.    (2/20) [facility] ED: 6/25/2025     Today, the patient rates their degree of pain as a 2 out of 10 on the numeric pain rating scale.     Maribell returns for continued chiropractic care. She reports that she has been doing well. She notes that her hamstrings are constantly tight now. She reports that she has noticed some low back pain and attributes that to the tightness she has in her hamstrings. The patient denies any changes in health since her last encounter and will follow up as scheduled.   ________________________________________  Initial exam:   Maribell Rosales presents for evaluation and treatment of left sided scapular pain and low back pain. She states that her left sided scapular pain started in mid-January. She denies any traumas, accidents, incidents, or injuries. She states that she has noticed a significant loss of strength in the left arm when compared to the right. She states that she predominately notices this decreased strength and some pain when performing lateral raises with weight. She reports that her secondary complaint of low back pain. She states that these symptoms have been present for years and occur intermittently. She states that standing for extended periods of time will aggravate her symptoms, causing a diffuse bilateral discomfort parallel to her belt line. She denies any radiating symptoms.     Maribell also reports a recent ankle injury. She states that that she was walking and suddenly felt pain along the the medial plantar surface of the foot, along the medial aspect of the achilles tendon and the lateral aspect of the achilles tendon. She states that her range of motion was decreased, she notes pain with palpation and difficulty with weight bearing movements. She denies any swelling or bruising.      Objective   Physical Exam  Neurological:      General: No focal  deficit present.      Mental Status: She is alert and oriented to person, place, and time.      Cranial Nerves: No dysarthria or facial asymmetry.      Sensory: Sensation is intact.      Motor: Motor function is intact.      Coordination: Coordination is intact.      Gait: Gait is intact.       Palpation of the following regions revealed:  Cervical: Upper trapezius bilateral, hypertonicity and tenderness.  Levator scapulae bilateral, hypertonicity and tenderness.  Cervical paraspinals bilateral, hypertonicity and tenderness.  Thoracic: Thoracic paraspinals bilateral, hypertonicity and tenderness.  Middle trapezius bilateral, hypertonicity and tenderness.  Rhomboids bilateral, hypertonicity and tenderness.  Lumbar: Lumbar paraspinals bilateral, hypertonicity and tenderness.  Quadratus lumborum bilateral, hypertonicity and tenderness.  Thoracolumbar fascia bilateral, muscular hypertonicity.    Segmental Joint(s): Segmental joint dysfunction was assessed with motion palpation and is identified in the following areas:  Cervical : C2 C4  Thoracic : T4, T5, T6, and T8  Lumbopelvic / Sacral SIJ : L4, L5/S1, and L SIJ    Assessment/Plan   Today's Treatment Included: Spinal manipulation to the following regions of segmental dysfunction identified on examination, using age-appropriate and injury specific force. Segmental Joint(s) Cervical : C2 C5 C6 Segmental Joint(s) Thoracic : T4, T5, T6, and T8 Segmental Joint(s) Lumbopelvic/Sacral SIJ : L4, L5/S1, R SIJ, and L SIJ  Chiropractic manipulation treatment techniques utilized: Diversified CMT, Pelvic drop table technique, and Flexion-distraction technique  Soft tissue mobilization techniques utilized during treatment: Cupping  Integrative Dry Needling (IDN) - Needles in/out:  8.  Neuromuscular Re-Education (68992): 2 Units; Start time: 9:25a  End time: 9:50a      Soft-tissue mobilization was performed in the following areas:  Lumbar Paraspinal mm. bilateral, Quadratus Lumborum  bilateral, and Thoracolumbar Fascia bilateral  Hamstrings bilateral    Recommended follow-up in 1 month(s).     The patient tolerated today's treatment with little or no additional discomfort and was instructed to contact the office for questions or concerns.

## 2025-05-21 ENCOUNTER — APPOINTMENT (OUTPATIENT)
Dept: INTEGRATIVE MEDICINE | Facility: CLINIC | Age: 20
End: 2025-05-21
Payer: COMMERCIAL

## 2025-05-21 DIAGNOSIS — M54.2 DORSALGIA OF CERVICOTHORACIC REGION: ICD-10-CM

## 2025-05-21 DIAGNOSIS — M99.02 SEGMENTAL AND SOMATIC DYSFUNCTION OF THORACIC REGION: ICD-10-CM

## 2025-05-21 DIAGNOSIS — M99.03 SEGMENTAL AND SOMATIC DYSFUNCTION OF LUMBAR REGION: ICD-10-CM

## 2025-05-21 DIAGNOSIS — M54.6 DORSALGIA OF CERVICOTHORACIC REGION: ICD-10-CM

## 2025-05-21 DIAGNOSIS — M79.9 POSTURAL STRAIN: ICD-10-CM

## 2025-05-21 DIAGNOSIS — M99.04 SEGMENTAL AND SOMATIC DYSFUNCTION OF SACRAL REGION: ICD-10-CM

## 2025-05-21 DIAGNOSIS — M99.01 SEGMENTAL AND SOMATIC DYSFUNCTION OF CERVICAL REGION: Primary | ICD-10-CM

## 2025-05-21 DIAGNOSIS — M54.59 MECHANICAL LOW BACK PAIN: ICD-10-CM

## 2025-05-21 PROCEDURE — 97112 NEUROMUSCULAR REEDUCATION: CPT | Performed by: CHIROPRACTOR

## 2025-05-21 PROCEDURE — 98941 CHIROPRACT MANJ 3-4 REGIONS: CPT | Performed by: CHIROPRACTOR

## 2025-05-21 NOTE — PROGRESS NOTES
Subjective   Patient ID: Maribell Rosales is a 20 y.o. female who presents May 21, 2025 for chiropractic care.    (3/20) [facility] ED: 6/25/2025     Today, the patient rates their degree of pain as a 2 out of 10 on the numeric pain rating scale.     Maribell returns for continued chiropractic care. She presents with increased low back pain and hamstring tension today. The patient denies any changes in health since her last encounter and will follow up as scheduled.   ________________________________________  Initial exam:   Maribell Rosales presents for evaluation and treatment of left sided scapular pain and low back pain. She states that her left sided scapular pain started in mid-January. She denies any traumas, accidents, incidents, or injuries. She states that she has noticed a significant loss of strength in the left arm when compared to the right. She states that she predominately notices this decreased strength and some pain when performing lateral raises with weight. She reports that her secondary complaint of low back pain. She states that these symptoms have been present for years and occur intermittently. She states that standing for extended periods of time will aggravate her symptoms, causing a diffuse bilateral discomfort parallel to her belt line. She denies any radiating symptoms.     Maribell also reports a recent ankle injury. She states that that she was walking and suddenly felt pain along the the medial plantar surface of the foot, along the medial aspect of the achilles tendon and the lateral aspect of the achilles tendon. She states that her range of motion was decreased, she notes pain with palpation and difficulty with weight bearing movements. She denies any swelling or bruising.      Objective   Physical Exam  Neurological:      General: No focal deficit present.      Mental Status: She is alert and oriented to person, place, and time.      Cranial Nerves: No dysarthria or facial asymmetry.       Sensory: Sensation is intact.      Motor: Motor function is intact.      Coordination: Coordination is intact.      Gait: Gait is intact.       Palpation of the following regions revealed:  Cervical: Upper trapezius bilateral, hypertonicity and tenderness.  Levator scapulae bilateral, hypertonicity and tenderness.  Cervical paraspinals bilateral, hypertonicity and tenderness.  Thoracic: Thoracic paraspinals bilateral, hypertonicity and tenderness.  Middle trapezius bilateral, hypertonicity and tenderness.  Rhomboids bilateral, hypertonicity and tenderness.  Lumbar: Lumbar paraspinals bilateral, hypertonicity and tenderness.  Quadratus lumborum bilateral, hypertonicity and tenderness.  Thoracolumbar fascia bilateral, muscular hypertonicity.    Segmental Joint(s): Segmental joint dysfunction was assessed with motion palpation and is identified in the following areas:  Cervical : C2 C4  Thoracic : T4, T5, T6, and T8  Lumbopelvic / Sacral SIJ : L4, L5/S1, and L SIJ    Assessment/Plan   Today's Treatment Included: Spinal manipulation to the following regions of segmental dysfunction identified on examination, using age-appropriate and injury specific force. Segmental Joint(s) Cervical : C2 C5 C6 Segmental Joint(s) Thoracic : T4, T5, T6, and T8 Segmental Joint(s) Lumbopelvic/Sacral SIJ : L4, L5/S1, R SIJ, and L SIJ  Chiropractic manipulation treatment techniques utilized: Diversified CMT, Pelvic drop table technique, and Flexion-distraction technique  Soft tissue mobilization techniques utilized during treatment: Cupping  Integrative Dry Needling (IDN) - Needles in/out:  8.  Neuromuscular Re-Education (80597): 2 Units; Start time: 3:10p  End time: 3:40p      Soft-tissue mobilization was performed in the following areas:  Lumbar Paraspinal mm. bilateral, Quadratus Lumborum bilateral, and Thoracolumbar Fascia bilateral  Hamstrings bilateral    Recommended follow-up in 1 month(s).     The patient tolerated today's  treatment with little or no additional discomfort and was instructed to contact the office for questions or concerns.

## 2025-05-22 ENCOUNTER — APPOINTMENT (OUTPATIENT)
Dept: INTEGRATIVE MEDICINE | Facility: CLINIC | Age: 20
End: 2025-05-22
Payer: COMMERCIAL

## 2025-05-25 ENCOUNTER — ANCILLARY PROCEDURE (OUTPATIENT)
Dept: URGENT CARE | Age: 20
End: 2025-05-25
Payer: COMMERCIAL

## 2025-05-25 ENCOUNTER — OFFICE VISIT (OUTPATIENT)
Dept: URGENT CARE | Age: 20
End: 2025-05-25
Payer: COMMERCIAL

## 2025-05-25 VITALS
DIASTOLIC BLOOD PRESSURE: 67 MMHG | RESPIRATION RATE: 16 BRPM | TEMPERATURE: 98.1 F | OXYGEN SATURATION: 98 % | HEART RATE: 74 BPM | SYSTOLIC BLOOD PRESSURE: 103 MMHG

## 2025-05-25 DIAGNOSIS — M79.674 GREAT TOE PAIN, RIGHT: ICD-10-CM

## 2025-05-25 DIAGNOSIS — S90.211A CONTUSION OF RIGHT GREAT TOE WITH DAMAGE TO NAIL, INITIAL ENCOUNTER: ICD-10-CM

## 2025-05-25 DIAGNOSIS — M79.674 GREAT TOE PAIN, RIGHT: Primary | ICD-10-CM

## 2025-05-25 PROCEDURE — 1036F TOBACCO NON-USER: CPT | Performed by: NURSE PRACTITIONER

## 2025-05-25 PROCEDURE — 99204 OFFICE O/P NEW MOD 45 MIN: CPT | Performed by: NURSE PRACTITIONER

## 2025-05-25 RX ORDER — MUPIROCIN 20 MG/G
OINTMENT TOPICAL 2 TIMES DAILY
Qty: 22 G | Refills: 0 | Status: SHIPPED | OUTPATIENT
Start: 2025-05-25 | End: 2025-06-01

## 2025-05-25 ASSESSMENT — PAIN SCALES - GENERAL: PAINLEVEL_OUTOF10: 10-WORST PAIN EVER

## 2025-05-25 ASSESSMENT — ENCOUNTER SYMPTOMS
ARTHRALGIAS: 1
JOINT SWELLING: 1

## 2025-05-25 NOTE — PATIENT INSTRUCTIONS
Ibuprofen/Tylenol for pain.  Mupirocin ointment around the toe 2 times a day  Please follow-up with podiatry.  Please call your primary care doctor next 5 to 7 days.  If worsening symptoms, please go to local emergency department for further evaluation.    Please follow up with your primary provider within one week if symptoms do not improve.  You may schedule an appointment online at [email or web address] or call [phone]. Go to the Emergency Department if symptoms significantly worsen or if you develop chest pain or shortness of breath.

## 2025-05-25 NOTE — PROGRESS NOTES
Subjective   Patient ID: Maribell Rosales is a 20 y.o. female. They present today with a chief complaint of Injury (Right big toe injury x today ).    History of Present Illness  Maribell Rosales is a 20 y.o. female who presents to the clinic for right great toe pain.  Patient states she dropped a 25 pound plate onto her toe today.  Patient states she has taken Motrin with relief. Pt denies any chest pain, sob, N/V at this time in clinic.             Past Medical History  Allergies as of 05/25/2025 - Reviewed 05/25/2025   Allergen Reaction Noted    Milk containing products (dairy) Diarrhea 08/01/2024       Prescriptions Prior to Admission[1]     Medical History[2]    Surgical History[3]     reports that she has never smoked. She has never been exposed to tobacco smoke. She has never used smokeless tobacco. She reports that she does not drink alcohol and does not use drugs.    Review of Systems  Review of Systems   Musculoskeletal:  Positive for arthralgias, gait problem and joint swelling.        Right great toe.   All other systems reviewed and are negative.                                 Objective    Vitals:    05/25/25 1716   BP: 103/67   BP Location: Left arm   Patient Position: Sitting   Pulse: 74   Resp: 16   Temp: 36.7 °C (98.1 °F)   SpO2: 98%     Patient's last menstrual period was 05/20/2025.    Physical Exam  Constitutional:       Appearance: Normal appearance.   Musculoskeletal:        Feet:    Feet:      Comments: Abrasion noted to the right great toe.  At the base of the nailbed.  Blood noted underneath the nailbed by the base of the nailbed.  Sensation intact to distal right great toe.  Cap refill less than 2 seconds    Neurological:      General: No focal deficit present.      Mental Status: She is alert and oriented to person, place, and time. Mental status is at baseline.   Psychiatric:         Mood and Affect: Mood normal.         Behavior: Behavior normal.         Procedures    Point of Care Test &  Imaging Results from this visit  No results found for this visit on 05/25/25.   Imaging  XR foot right 3+ views  Result Date: 5/25/2025  No acute osseous abnormality of the great toe or remainder of the right foot.     MACRO: None.   Signed by: Tyrel Rossi 5/25/2025 7:00 PM Dictation workstation:   [id number]      Cardiology, Vascular, and Other Imaging  No other imaging results found for the past 2 days      Diagnostic study results (if any) were reviewed by [facility].    Assessment/Plan   Allergies, medications, history, and pertinent labs/EKGs/Imaging reviewed by Carson Liu, APRN-CNP.     Medical Decision Making  History and examination consistent with contusion. No evidence of compartment syndrome, sprain, or cellulitis. Imaging is negative for fractures or other acute bony abnormalities. Plan is for RICE and supportive treatment at home. Return to clinic if s/s change or worsen.  -follow up with podiatry   - Mupirocin ointment to be placed around the nailbed 2 times a day.    Orders and Diagnoses  Diagnoses and all orders for this visit:  Great toe pain, right  -     XR foot right 3+ views; Future  -     mupirocin (Bactroban) 2 % ointment; Apply topically 2 times a day for 7 days.      Medical Admin Record      Patient disposition: Home    Electronically signed by [facility]  7:02 PM           [1] (Not in a hospital admission)   [2]   Past Medical History:  Diagnosis Date    Allergy to milk products     Anemia     GERD (gastroesophageal reflux disease)     Unspecified asthma, uncomplicated ([facility])     Childhood asthma - stable   [3]   Past Surgical History:  Procedure Laterality Date    UPPER GASTROINTESTINAL ENDOSCOPY      WISDOM TOOTH EXTRACTION

## 2025-05-27 ENCOUNTER — APPOINTMENT (OUTPATIENT)
Dept: PRIMARY CARE | Facility: CLINIC | Age: 20
End: 2025-05-27
Payer: COMMERCIAL

## 2025-06-02 ENCOUNTER — APPOINTMENT (OUTPATIENT)
Dept: GASTROENTEROLOGY | Facility: CLINIC | Age: 20
End: 2025-06-02
Payer: COMMERCIAL

## 2025-06-09 ENCOUNTER — APPOINTMENT (OUTPATIENT)
Dept: GASTROENTEROLOGY | Facility: CLINIC | Age: 20
End: 2025-06-09
Payer: COMMERCIAL

## 2025-06-09 VITALS
HEART RATE: 76 BPM | SYSTOLIC BLOOD PRESSURE: 97 MMHG | DIASTOLIC BLOOD PRESSURE: 62 MMHG | WEIGHT: 141 LBS | BODY MASS INDEX: 24.98 KG/M2 | TEMPERATURE: 98.2 F | HEIGHT: 63 IN

## 2025-06-09 DIAGNOSIS — K21.9 GASTROESOPHAGEAL REFLUX DISEASE, UNSPECIFIED WHETHER ESOPHAGITIS PRESENT: ICD-10-CM

## 2025-06-09 PROCEDURE — 99212 OFFICE O/P EST SF 10 MIN: CPT | Performed by: STUDENT IN AN ORGANIZED HEALTH CARE EDUCATION/TRAINING PROGRAM

## 2025-06-09 PROCEDURE — 99214 OFFICE O/P EST MOD 30 MIN: CPT | Performed by: STUDENT IN AN ORGANIZED HEALTH CARE EDUCATION/TRAINING PROGRAM

## 2025-06-09 PROCEDURE — 3008F BODY MASS INDEX DOCD: CPT | Performed by: STUDENT IN AN ORGANIZED HEALTH CARE EDUCATION/TRAINING PROGRAM

## 2025-06-09 RX ORDER — PANTOPRAZOLE SODIUM 40 MG/1
40 TABLET, DELAYED RELEASE ORAL
Qty: 30 TABLET | Refills: 3 | Status: SHIPPED | OUTPATIENT
Start: 2025-06-09 | End: 2025-10-07

## 2025-06-09 NOTE — PATIENT INSTRUCTIONS
Thank you for seeing us in clinic today!     In summary, please do the following:  Please sleep on a wedge pillow at 45 degrees at night to help your reflux when lying down.   2.   Continue your pepcid at night.   3. Take pantoprazole 30 minutes in the morning before breakfast on an empty stomach.   4. You can take gaviscon after meals to help with your symptoms or if that does not help, try tums/rolaids to neutralize the acid.   5. Try to time your coffee earlier in the day. Trigger foods listed below:    --Avoid “trigger foods”, or food/drink that tend to precipitate acid reflux symptoms. Common offenders include alcohol, fatty/spicy meals, tomato sauce, chocolate, caffeinated beverages such as coffee and tea, carbonated beverages and peppermint.    If you have any questions or concerns, please call the office at [phone].

## 2025-06-09 NOTE — PROGRESS NOTES
REASON FOR VISIT:  fu stomach issues    HPI:  Maribell Rosales is a 20 y.o. female  with a pmhx of GERD, LORETTA, lactase/palatinase deficiency who presents for stomach issues.      Summary:  OV 8/2023. Presented for LORETTA (hb 11.9). Per mother, had GERD at a young age. Had UGI sx which resolved after stopping lactose. She has been seen by [facility] for nausea and reflux. Her sx have entirely resolved with vegan diet. S/p EGD 2018 without e/o celiac disease. Vegan however does eat a healthy diet, includes dark greens. Reassuring exam and history. Iincreased iron to bid and recheck labs in 3 months.   Seen by [facility] 2/19/2025 for anxiety and GERD, thought to be related to eating dinner close to bedtime and continued stress. Referred to GI.   OV 3/2025: for GERD recurrence x 2 months, unclear trigger. Inappropriate ppi timing. Lifestyle and ppi education, consider ruq us if no improvement in nausea.     Today, she reports she is not very compliant with the pantoprazole due to timing prior to dinner, taking approximately 3 times a week. Takes pepcid every night consistently. This helped her symptoms but did not resolve them. Abdominal pain resolved. No daytime symptoms, mostly at night when she lays down prior to bedtime. Does not wake up from sleep with reflux and no sx once asleep. Stays upright 2-3 hours after dinner. Avoids tomato based foods. Drinks coffee, last at 3 pm. Not elevated enough (2 pillows). No longer having nausea.  Denies dysphagia, change in bowel habits, early satiety, unintentional weight loss, fatigue, hematochezia, hematemesis, melena or fevers/chills. BMs are normal.      Med list notable for ferrous sulfate daily.     Labs notable for 1/20/25 neg h pylori stool antigen, Hb 12.8, Iron 78, Ferritin 39, B12 917, Vit D 20, celiac panel neg 7/2023.     No fhx of CRC. MGM with pancreatic cancer. No gastric or esophageal cancer. No tobacco or alcohol use.      Social hx:  Denies tobacco, alcohol, drug use.     Prev endoscopic eval:   EGD 2018: The examined esophagus was normal. Four biopsies were obtained with cold  forceps for histology in the lower third of the esophagus, as well as two biopsies in the middle third of the esophagus.   The entire examined stomach was normal. Two biopsies were obtained with cold forceps for histology in the gastric body, as well as two biopsies in the gastric antrum.    The examined duodenum was normal. Two biopsies were obtained with cold forceps for histology in the second portion of the duodenum, as well as two biopsies in the duodenal bulb. This was biopsied with a cold forceps for evaluation of disaccharidase deficiency.  Path: A. DUODENUM, SECOND PORTION, BIOPSY:  -- DUODENAL MUCOSA, WITHIN NORMAL LIMITS.     B. DUODENAL BULB, BIOPSY:  -- DUODENAL MUCOSA, WITHIN NORMAL LIMITS.     C. STOMACH, BIOPSY:  -- GASTRIC ANTRAL AND OXYNTIC-TYPE MUCOSA, WITHIN NORMAL LIMITS.  -- NO HELICOBACTER PYLORI ORGANISMS IDENTIFIED ON H T E STAIN.     D. ESOPHAGUS, DISTAL, BIOPSY:  -- SQUAMOUS MUCOSA, WITHIN NORMAL LIMITS.     E. ESOPHAGUS, MID, BIOPSY:  -- SQUAMOUS MUCOSA, WITHIN NORMAL LIMITS.     REVIEW OF SYSTEMS  CONSTITUTIONAL: Negative for fevers  HEENT: Negative for frequent/significant headaches  RESPIRATORY: Negative for hemoptysis  CARDIOVASCULAR: Negative for chest pain  GI: See HPI  : Negative for hematuria  MUSCULOSKELETAL: Negative for arthralgia or myalgia  INTEGUMENTARY/SKIN: Negative for rash or skin lesion  HEMATOLOGY/LYMPHOLOGY: Negative for prolonged bleeding  ENDOCRINE: Negative for cold/heat intolerance  NEURO: Negative for encephalopathy  PSYCH: Negative for new changes in mood or affect         Allergies[1]    Medical History[2]    Surgical History[3]    Family History[4]    Social History     Tobacco Use    Smoking status: Never     Passive exposure: Never    Smokeless tobacco: Never   Substance Use Topics    Alcohol use: Never  "      Current Medications[5]    PHYSICAL EXAM:  BP 97/62   Pulse 76   Temp 36.8 °C (98.2 °F)   Ht 1.6 m (5' 3\")   Wt 64 kg (141 lb)   LMP 05/20/2025   BMI 24.98 kg/m²    Gen: aaox3, NAD  Head: Normocephalic, atraumatic  Eyes: Sclera anicteric, conjunctiva pink   Neck: Supple  Heart: RRR, no murmurs, rubs or gallops  Lungs: CTAB, non-labored breathing  Abd: Soft, non-distended, non-tender, bowel sounds present, no rebound or guarding, no organomegaly  Ext: No LE edema b/l  Neuro: no gross focal deficits  Psych: Congruent mood and affect, appropriate insight and judgement  Skin: No jaundice      Lab Results   Component Value Date    ALT 11 04/28/2023    AST 14 04/28/2023    ALKPHOS 43 04/28/2023    BILITOT 0.4 04/28/2023     No results found for: \"INR\", \"PTT\"  Lab Results   Component Value Date    WBC 6.3 01/14/2025    HGB 12.8 01/14/2025    MCV 94 01/14/2025     01/14/2025       === 06/10/24 ===    MR ANKLE RIGHT WO CONTRAST    - Impression -  No evidence of internal derangement right ankle.    Signed by: Karthikeyan Choi 6/10/2024 1:11 PM  Dictation workstation:   [id number]      ASSESSMENT  GERD    GERD symptoms only upon lying recumbent at night, otherwise none during the day or noctural while on H2 blcokade (noncompliant with ppi therapy due to lifestyle). Discussed lifestyle measures as well as trial of ppi as she does have some relief from pepid--anticipate will be well controlled with ppi use and lifestyle measures. EGD 2018, no alarm sx. Nausea resolved with tx of GERD.     PLAN  --discussed methods to mitigate reflux upon lying recumbent at night prior to bedtime  --wedge pillow to ensure elevated 45 degree  --ppi in the am daily, pt to try to be compliant with this as pm dose difficult with lifestyle  --pepcid at bedtime  --trial of gaviscon after dinner, if no relief, antacids post dinner   --trigger foods reviewed  --recommended coffee intake in the morning rather than pm, if at all    FU 4 " months    Signature: Jesi Beck MD    Date: 6/9/2025  Time: 11:07 AM         [1]   Allergies  Allergen Reactions    Milk Containing Products (Dairy) Diarrhea   [2]   Past Medical History:  Diagnosis Date    Allergy to milk products     Anemia     GERD (gastroesophageal reflux disease)     Unspecified asthma, uncomplicated ([facility])     Childhood asthma - stable   [3]   Past Surgical History:  Procedure Laterality Date    UPPER GASTROINTESTINAL ENDOSCOPY      WISDOM TOOTH EXTRACTION     [4]   Family History  Problem Relation Name Age of Onset    No Known Problems Mother      No Known Problems Father      No Known Problems Brother     [5]   Current Outpatient Medications   Medication Sig Dispense Refill    chlorhexidine (Peridex) 0.12 % solution 15 ml swish and spit for 30 seconds night prior to surgery and morning of surgery 473 mL 0    cholecalciferol (Vitamin D-3) 50 mcg (2,000 unit) capsule Take 5,000 Units by mouth once daily.      cyanocobalamin, vitamin B-12, 5,000 mcg tablet, sublingual Place 1 tablet under the tongue once daily.      ergocalciferol (Vitamin D-2) 1.25 MG (33852 UT) capsule Take 4 capsules (5,000 mcg) by mouth 1 (one) time per week.      escitalopram (Lexapro) 20 mg tablet Take 25 mg by mouth once daily.      famotidine (Pepcid) 40 mg tablet Take 1 tablet (40 mg) by mouth once daily at bedtime. 30 tablet 11    ferrous sulfate 325 (65 Fe) MG EC tablet Take 65 mg by mouth 3 times a day with meals. Do not crush, chew, or split.      omega-3 acid ethyl esters (Lovaza) 1 gram capsule Take 1 capsule (1 g) by mouth 2 times a day.      omeprazole (PriLOSEC) 20 mg DR capsule Take 1 capsule (20 mg) by mouth once daily in the morning. Take before meals. Do not crush or chew. 90 capsule 1    omeprazole OTC (PriLOSEC OTC) 20 mg EC tablet Take 1 tablet (20 mg) by mouth once daily. Do not crush, chew, or split. 60 tablet 1    pantoprazole (ProtoNix) 40 mg EC tablet Take 1 tablet (40 mg) by mouth  once daily in the evening.  Take before meals. One hour before dinner. Do not crush, chew, or split. 30 tablet 3    -iron fum-folic acid (Prenatal 19) 29 mg iron- 1 mg tablet Take by mouth.       No current facility-administered medications for this visit.

## 2025-06-18 ENCOUNTER — APPOINTMENT (OUTPATIENT)
Dept: INTEGRATIVE MEDICINE | Facility: CLINIC | Age: 20
End: 2025-06-18
Payer: COMMERCIAL

## 2025-06-23 ENCOUNTER — APPOINTMENT (OUTPATIENT)
Dept: INTEGRATIVE MEDICINE | Facility: CLINIC | Age: 20
End: 2025-06-23
Payer: COMMERCIAL

## 2025-07-21 ENCOUNTER — APPOINTMENT (OUTPATIENT)
Dept: PODIATRY | Facility: CLINIC | Age: 20
End: 2025-07-21
Payer: COMMERCIAL

## 2025-07-23 ENCOUNTER — APPOINTMENT (OUTPATIENT)
Dept: PRIMARY CARE | Facility: CLINIC | Age: 20
End: 2025-07-23
Payer: COMMERCIAL

## 2025-07-23 VITALS
HEART RATE: 72 BPM | HEIGHT: 63 IN | WEIGHT: 142 LBS | DIASTOLIC BLOOD PRESSURE: 70 MMHG | OXYGEN SATURATION: 100 % | BODY MASS INDEX: 25.16 KG/M2 | SYSTOLIC BLOOD PRESSURE: 110 MMHG

## 2025-07-23 DIAGNOSIS — Z00.00 HEALTHCARE MAINTENANCE: ICD-10-CM

## 2025-07-23 DIAGNOSIS — Z11.59 NEED FOR HEPATITIS C SCREENING TEST: ICD-10-CM

## 2025-07-23 DIAGNOSIS — Z00.00 ANNUAL PHYSICAL EXAM: Primary | ICD-10-CM

## 2025-07-23 PROBLEM — Z78.9 CONSUMES A VEGAN DIET: Status: ACTIVE | Noted: 2025-07-23

## 2025-07-23 PROBLEM — F42.9 OBSESSIVE-COMPULSIVE DISORDER: Status: ACTIVE | Noted: 2025-07-23

## 2025-07-23 PROCEDURE — 1036F TOBACCO NON-USER: CPT | Performed by: INTERNAL MEDICINE

## 2025-07-23 PROCEDURE — 99395 PREV VISIT EST AGE 18-39: CPT | Performed by: INTERNAL MEDICINE

## 2025-07-23 PROCEDURE — 3008F BODY MASS INDEX DOCD: CPT | Performed by: INTERNAL MEDICINE

## 2025-07-23 ASSESSMENT — PATIENT HEALTH QUESTIONNAIRE - PHQ9
SUM OF ALL RESPONSES TO PHQ9 QUESTIONS 1 AND 2: 0
1. LITTLE INTEREST OR PLEASURE IN DOING THINGS: NOT AT ALL
2. FEELING DOWN, DEPRESSED OR HOPELESS: NOT AT ALL
SUM OF ALL RESPONSES TO PHQ9 QUESTIONS 1 AND 2: 0
1. LITTLE INTEREST OR PLEASURE IN DOING THINGS: NOT AT ALL
2. FEELING DOWN, DEPRESSED OR HOPELESS: NOT AT ALL

## 2025-07-23 NOTE — PROGRESS NOTES
"Subjective   Patient ID: Maribell Rosales is a 20 y.o. female who presents for Establish Care (New patient here to establish care) and Annual Exam.    HPI   The patient is year old woman with PMH significant for anxiety, OCD and GERD who presents to establish medical care and for an annual wellness exam.  The patient is a vegan, requesting a referral to a Dietitian. \"I would like to know if my nutrition is optimal\".  Review of Systems  Negative   Objective   /70   Pulse 72   Ht 1.6 m (5' 3\")   Wt 64.4 kg (142 lb)   SpO2 100%   BMI 25.15 kg/m²     Physical Exam  NAD. Cooperative.  HEENT: WNL  Neck: WNL  Lungs CTA  Heart: RRR  Abdomen: WNL  Musculoskeletal system: WNL  Neurologic exam: WNL    Assessment/Plan   Diagnoses and all orders for this visit:  Annual physical exam  -     Comprehensive metabolic panel; Future  -     TSH; Future  -     Hemoglobin A1C; Future  -     Lipid panel; Future  -     CBC; Future  Need for hepatitis C screening test  -     Hepatitis C Antibody; Future  Healthcare maintenance  -     Referral to Nutrition Services; Future  GYN exam is recommended, a scheduling phone number is provided.    Discussed health benefits from whole food plant based diet. Bibliotherapy: The Martha Oscar, THOMAS Olvera, PhD was suggested.  Reviewed medical records from 4/27/21 to 3/3/25, discussed with the patient.       "

## 2025-07-26 LAB
ALBUMIN SERPL-MCNC: 4.4 G/DL (ref 3.6–5.1)
ALP SERPL-CCNC: 46 U/L (ref 31–125)
ALT SERPL-CCNC: 13 U/L (ref 6–29)
ANION GAP SERPL CALCULATED.4IONS-SCNC: 7 MMOL/L (CALC) (ref 7–17)
AST SERPL-CCNC: 14 U/L (ref 10–30)
BILIRUB SERPL-MCNC: 0.4 MG/DL (ref 0.2–1.2)
BUN SERPL-MCNC: 7 MG/DL (ref 7–25)
CALCIUM SERPL-MCNC: 9.1 MG/DL (ref 8.6–10.2)
CHLORIDE SERPL-SCNC: 100 MMOL/L (ref 98–110)
CHOLEST SERPL-MCNC: 118 MG/DL
CHOLEST/HDLC SERPL: 3.4 (CALC)
CO2 SERPL-SCNC: 28 MMOL/L (ref 20–32)
CREAT SERPL-MCNC: 0.52 MG/DL (ref 0.5–0.96)
EGFRCR SERPLBLD CKD-EPI 2021: 135 ML/MIN/1.73M2
ERYTHROCYTE [DISTWIDTH] IN BLOOD BY AUTOMATED COUNT: 12.5 % (ref 11–15)
EST. AVERAGE GLUCOSE BLD GHB EST-MCNC: 88 MG/DL
EST. AVERAGE GLUCOSE BLD GHB EST-SCNC: 4.9 MMOL/L
GLUCOSE SERPL-MCNC: 76 MG/DL (ref 65–139)
HBA1C MFR BLD: 4.7 %
HCT VFR BLD AUTO: 37.7 % (ref 35–45)
HCV AB SERPL QL IA: NORMAL
HDLC SERPL-MCNC: 35 MG/DL
HGB BLD-MCNC: 12 G/DL (ref 11.7–15.5)
LDLC SERPL CALC-MCNC: 69 MG/DL (CALC)
MCH RBC QN AUTO: 30.9 PG (ref 27–33)
MCHC RBC AUTO-ENTMCNC: 31.8 G/DL (ref 32–36)
MCV RBC AUTO: 97.2 FL (ref 80–100)
NONHDLC SERPL-MCNC: 83 MG/DL (CALC)
PLATELET # BLD AUTO: 215 THOUSAND/UL (ref 140–400)
PMV BLD REES-ECKER: 11.8 FL (ref 7.5–12.5)
POTASSIUM SERPL-SCNC: 4.1 MMOL/L (ref 3.5–5.3)
PROT SERPL-MCNC: 6.8 G/DL (ref 6.1–8.1)
RBC # BLD AUTO: 3.88 MILLION/UL (ref 3.8–5.1)
SODIUM SERPL-SCNC: 135 MMOL/L (ref 135–146)
TRIGL SERPL-MCNC: 68 MG/DL
TSH SERPL-ACNC: 0.78 MIU/L
WBC # BLD AUTO: 6.6 THOUSAND/UL (ref 3.8–10.8)

## 2025-07-29 ENCOUNTER — HOSPITAL ENCOUNTER (OUTPATIENT)
Dept: RADIOLOGY | Facility: CLINIC | Age: 20
Discharge: HOME | End: 2025-07-29
Payer: COMMERCIAL

## 2025-07-29 ENCOUNTER — APPOINTMENT (OUTPATIENT)
Dept: ORTHOPEDIC SURGERY | Facility: CLINIC | Age: 20
End: 2025-07-29
Payer: COMMERCIAL

## 2025-07-29 DIAGNOSIS — M25.871 ANKLE IMPINGEMENT SYNDROME, RIGHT: ICD-10-CM

## 2025-07-29 DIAGNOSIS — M76.821 POSTERIOR TIBIAL TENDON DYSFUNCTION, RIGHT: ICD-10-CM

## 2025-07-29 DIAGNOSIS — S93.431A ANKLE SYNDESMOSIS DISRUPTION, RIGHT, INITIAL ENCOUNTER: ICD-10-CM

## 2025-07-29 PROCEDURE — 1036F TOBACCO NON-USER: CPT | Performed by: STUDENT IN AN ORGANIZED HEALTH CARE EDUCATION/TRAINING PROGRAM

## 2025-07-29 PROCEDURE — 99212 OFFICE O/P EST SF 10 MIN: CPT | Performed by: STUDENT IN AN ORGANIZED HEALTH CARE EDUCATION/TRAINING PROGRAM

## 2025-07-29 PROCEDURE — 99213 OFFICE O/P EST LOW 20 MIN: CPT | Performed by: STUDENT IN AN ORGANIZED HEALTH CARE EDUCATION/TRAINING PROGRAM

## 2025-07-29 PROCEDURE — 73610 X-RAY EXAM OF ANKLE: CPT | Mod: RT

## 2025-07-29 PROCEDURE — 73610 X-RAY EXAM OF ANKLE: CPT | Mod: RIGHT SIDE | Performed by: RADIOLOGY

## 2025-07-29 ASSESSMENT — PAIN - FUNCTIONAL ASSESSMENT: PAIN_FUNCTIONAL_ASSESSMENT: NO/DENIES PAIN

## 2025-07-30 NOTE — PROGRESS NOTES
ORTHOPAEDIC SURGERY PROGRESS NOTE    Chief Complaint:  Right ankle pain    History Of Present Illness  Maribell Rosales is a 20 y.o. female who presents for evaluation of right ankle pain.  Patient reports the atraumatic onset of right ankle pain while walking approximately 6 weeks ago.  Patient denies specific injury or change in her activity or shoewear.  Pain is made worse with walking and standing.  She has never had a pain like this in the past.  She has tried dry needling as well as over-the-counter bracing and limiting her activity with persistent pain.  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  This has been difficult for her as well is working out.  She denies any associated numbness, tingling or weakness.    04/25/2024: Patient returns for follow-up of her right ankle injury.  She has been in physical therapy and noting improvements with respect to her instability.  She reports that overall she is improving but continues with moderate pain in her ankle rated as 5 out of 10.  She has not had any recurrent brendon instability events.    06/11/2024: Patient returns for follow-up of her right ankle injury.  Patient continues with 8 out of 10 pain in her right ankle.  She is present with her mom today.  She reports several days of near complete pain relief following previous right ankle corticosteroid injection.  Unfortunately, she has continued with relatively severe and debilitating pain in her ankle.  She does notice a radiating pain that goes into her foot, this is reportedly about 30% of her overall pain.  Her more typical pain is on the outside of her ankle.  She has been unable to return to running due to pain.  She is obviously quite frustrated by this process.    08/14/2024: Patient returns s/p right ankle arthroscopy with extensive debridement and syndesmotic stabilization from 08/01/2024.  Patient is currently reporting 2 out of 10 pain that is gradually improving.  She  reports that the majority of her preoperative pain has actually resolved.  Patient has been compliant with nonweightbearing restrictions in a short leg fiberglass splint.  She is present with her mother today.    08/27/2024: Patient returns s/p right ankle arthroscopy with extensive debridement and syndesmotic stabilization from 08/01/2024.  Patient is currently reporting 2 out of 10 pain that is gradually improving.  She continues to note significant improvement from her preoperative state.  She has been ambulating with use of crutches and continued on aspirin.  She reports that pain at this point feels surgical.  She is present with her mother today.     09/20/2024: Patient returns s/p right ankle arthroscopy with extensive debridement and syndesmotic stabilization from 08/01/2024.  Patient has been in physical pain that is gradually proving.  She has not had any recurrence of events.  She has continued in physical therapy and is utilizing a lace up style ankle brace.  She has begun training with the [employer] ESPINOZA.  She denies any new trauma or swelling.    11/12/2024: Patient returns s/p right ankle arthroscopy with extensive debridement and syndesmotic stabilization from 08/01/2024.  Patient is currently reporting no pain.  She has been working with physical therapy.  She had sudden worsening of her pain by both discussion with the patient as well as EMR review following a trip to [facility] including a wedding.  This has resolved.    02/12/2025: Patient returns s/p right ankle arthroscopy with extensive debridement and syndesmotic stabilization from 08/01/2024.  Patient has been recovering well.  She has noticed some increased pain due to the cold weather.  She has returned to running.  She believes she overdid it while running recently.  She is continuing physical therapy.  She is quite pleased with her progress to date.    7/29/2025: Patient returns for follow-up s/p right ankle arthroscopy with extensive  debridement and syndesmotic stabilization from 8/1/2024.  Patient is currently reporting no pain.  She has no reported hardware irritation.  She is continuing graduate studies.    Past Medical History  Past Medical History:   Diagnosis Date    Allergy to milk products     Anemia     GERD (gastroesophageal reflux disease)     Unspecified asthma, uncomplicated ([facility])     Childhood asthma - stable       Surgical History  Recent Surgeries in Orthopaedic Surgery            No cases to display             Social History  Social History     Socioeconomic History    Marital status: Single   Tobacco Use    Smoking status: Never     Passive exposure: Never    Smokeless tobacco: Never   Vaping Use    Vaping status: Never Used   Substance and Sexual Activity    Alcohol use: Never    Drug use: Never    Sexual activity: Defer       Family History  Family History   Problem Relation Name Age of Onset    No Known Problems Mother      No Known Problems Father      No Known Problems Brother          Allergies  Allergies   Allergen Reactions    Milk Containing Products (Dairy) Diarrhea       Review of Systems  REVIEW OF SYSTEMS  Constitutional: no unplanned weight loss  Psychiatric: no suicidal ideation  ENT: no vision changes, no sinus problems  Pulmonary: no shortness of breath  Lymphatic: no enlarged lymph nodes  Cardiovascular: no chest pain or shortness of breath  Gastrointestinal: no stomach problems  Genitourinary: no dysuria   Skin: no rashes  Endocrine: no thyroid problems  Neurological: no headache, no numbness  Hematological: no easy bruising  Musculoskeletal: Right ankle pain    Physical Exam  PHYSICAL EXAMINATION  Constitutional Exam: well developed and well nourished  Psychiatric Exam: alert and oriented, appropriate mood and behavior  Eye Exam: EOMI  Pulmonary Exam: breathing non-labored, no apparent distress  Lymphatic exam: no appreciable lymphadenopathy in the lower extremities  Cardiovascular exam: RRR to  peripheral palpation, DP pulses 2+, PT 2+, toes are pink with good capillary refill, no pitting edema  Skin exam: no open lesions, rashes, abrasions or ulcerations  Neurological exam: sensation to light touch intact in both lower extremities in peripheral and dermatomal distributions (except for any abnormalities noted in musculoskeletal exam)    Musculoskeletal exam: Right lower extremity examination.  Patient ankle incisions well-healed.  Nontender to palpation overlying the plate and cortical buttons laterally although evident. She has sensation intact light touch grossly in a saphenous, sural, superficial peroneal, deep peroneal and tibial nerve distribution.  She has 5 out of 5 strength in plantarflexion, dorsiflexion and EHL.  She is 2+ DP/PT pulse palpated.  Patient has a negative anterior drawer, negative talar tilt, negative dorsiflexion external rotation stress test.    Last Recorded Vitals  There were no vitals taken for this visit.    Laboratory Results    No results found for this or any previous visit (from the past 24 hours).    Radiology Results   X-ray imaging 3 view weightbearing right ankle reviewed and independently evaluated by me demonstrates maintained alignment following syndesmotic stabilization.  There ankle mortise remains well aligned.  There is no sariah-implant fracture, lucency or loosening.    Assessment/Plan:  20-year-old female s/p right ankle arthroscopy with extensive debridement and syndesmotic stabilization from 08/01/2024 who presents with both clinical and radiographic evidence of healing.  I would recommend the patient continue weightbearing to her tolerance in her right lower extremity.  I will continue to have no formal restrictions for her.  I reviewed that if she were to develop increased irritation from the hardware that certainly consideration could be made for hardware removal.  I would plan to see the patient back on an as-needed basis.  I have encouraged the patient to  contact the office if she develops any new pain, worsening symptoms or she has any further questions.  Upon return, patient require three-view weightbearing right ankle.    Antwon Washington MD, BINTA  Department of Orthopaedic Surgery  [facility]

## 2025-08-11 ENCOUNTER — APPOINTMENT (OUTPATIENT)
Dept: GASTROENTEROLOGY | Facility: CLINIC | Age: 20
End: 2025-08-11
Payer: COMMERCIAL

## 2025-08-12 ENCOUNTER — APPOINTMENT (OUTPATIENT)
Dept: ORTHOPEDIC SURGERY | Facility: CLINIC | Age: 20
End: 2025-08-12
Payer: COMMERCIAL

## 2025-08-25 ENCOUNTER — NUTRITION (OUTPATIENT)
Dept: NUTRITION | Facility: HOSPITAL | Age: 20
End: 2025-08-25
Payer: COMMERCIAL

## 2025-08-25 VITALS — BODY MASS INDEX: 24.37 KG/M2 | HEIGHT: 64 IN

## 2025-08-25 DIAGNOSIS — Z71.3 DIETARY COUNSELING AND SURVEILLANCE: ICD-10-CM

## 2025-08-25 DIAGNOSIS — Z00.00 HEALTHCARE MAINTENANCE: Primary | ICD-10-CM

## 2025-08-25 PROCEDURE — 97802 MEDICAL NUTRITION INDIV IN: CPT

## 2025-09-10 ENCOUNTER — APPOINTMENT (OUTPATIENT)
Dept: INTEGRATIVE MEDICINE | Facility: CLINIC | Age: 20
End: 2025-09-10
Payer: COMMERCIAL

## 2025-09-16 ENCOUNTER — APPOINTMENT (OUTPATIENT)
Dept: INTEGRATIVE MEDICINE | Facility: CLINIC | Age: 20
End: 2025-09-16
Payer: COMMERCIAL

## 2025-10-13 ENCOUNTER — APPOINTMENT (OUTPATIENT)
Dept: GASTROENTEROLOGY | Facility: CLINIC | Age: 20
End: 2025-10-13
Payer: COMMERCIAL

## (undated) DEVICE — SOLUTION, IRRIGATION, SODIUM CHLORIDE 0.9%, 1000 ML, POUR BOTTLE

## (undated) DEVICE — Device

## (undated) DEVICE — NEEDLE, SPINAL, QUINCKE, 18 G X 3.5 IN, PINK HUB

## (undated) DEVICE — DRESSING, GAUZE, 16 PLY, 4 X 4 IN, STERILE

## (undated) DEVICE — DRILL BIT, 2.5MM CALIBRATED

## (undated) DEVICE — TUBING, PUMP REDEUCE 8FT STERILE

## (undated) DEVICE — PADDING, WEBRIL, UNDERCAST, STERILE, 6 IN

## (undated) DEVICE — DRESSING, GAUZE, PETROLATUM, PATCH, XEROFORM, 1 X 8 IN, STERILE

## (undated) DEVICE — STRAP, ANKLE, DISTRACTOR, ARTHROSCOPY, STERILE

## (undated) DEVICE — GLOVE, SURGICAL, PROTEXIS PI ORTHO, 7.5, PF, LF

## (undated) DEVICE — NEEDLE, HYPODERMIC, MONOJECT, 18 G X 1.5 IN

## (undated) DEVICE — BANDAGE, ELASTIC, PREMIUM, SELF-CLOSE, 6 IN X 5.5 YD, STERILE

## (undated) DEVICE — STRAP, ANKLE, DISTRACTOR, GUHL

## (undated) DEVICE — DRAPE COVER, C ARM, FLOUROSCAN IMAGING SYS

## (undated) DEVICE — APPLICATOR, CHLORAPREP, W/ORANGE TINT, 26ML

## (undated) DEVICE — DRILL BIT, 2.5 MM

## (undated) DEVICE — TOWEL, SURGICAL, NEURO, O/R, 16 X 26, BLUE, STERILE

## (undated) DEVICE — SUTURE, ETHILON, 3-0, 30 IN, FS-1, BLACK

## (undated) DEVICE — COVER, C-ARM W/CLIPS, OEC GE

## (undated) DEVICE — BB-TAK, THREADED

## (undated) DEVICE — SYRINGE, 10 CC, LUER LOCK

## (undated) DEVICE — BANDAGE, COFLEX, 6 X 5 YDS, FOAM TAN, STERILE, LF

## (undated) DEVICE — DRAPE, SHEET, THREE QUARTER, FAN FOLD, 57 X 77 IN

## (undated) DEVICE — GUIDEWIRE, THREADED, .062, W/ TROCAR TIP

## (undated) DEVICE — SUTURE, ETHILON, 3/0, FS1, 18 IN, BLK MONO

## (undated) DEVICE — SUTURE, VICRYL, 3-0, 27 IN, CT-2, UNDYED

## (undated) DEVICE — TUBING, PATIENT 8FT STERILE